# Patient Record
Sex: FEMALE | Race: WHITE | NOT HISPANIC OR LATINO | Employment: OTHER | URBAN - NONMETROPOLITAN AREA
[De-identification: names, ages, dates, MRNs, and addresses within clinical notes are randomized per-mention and may not be internally consistent; named-entity substitution may affect disease eponyms.]

---

## 2017-01-01 LAB
ALBUMIN SERPL-MCNC: 3.9 G/DL (ref 3.5–5.2)
ALBUMIN SERPL-MCNC: 3.9 G/DL (ref 3.5–5.2)
ALP BLD-CCNC: 84 U/L (ref 35–104)
ALT SERPL-CCNC: 6 U/L (ref 5–33)
ANION GAP SERPL CALCULATED.3IONS-SCNC: 14 MMOL/L (ref 7–19)
ANION GAP SERPL CALCULATED.3IONS-SCNC: 14 MMOL/L (ref 7–19)
AST SERPL-CCNC: 11 U/L (ref 5–32)
BASOPHILS ABSOLUTE: 0.1 K/UL (ref 0–0.2)
BASOPHILS RELATIVE PERCENT: 0.7 % (ref 0–1)
BILIRUB SERPL-MCNC: <0.2 MG/DL (ref 0.2–1.2)
BUN BLDV-MCNC: 35 MG/DL (ref 8–23)
BUN BLDV-MCNC: 35 MG/DL (ref 8–23)
CALCIUM SERPL-MCNC: 8.9 MG/DL (ref 8.8–10.2)
CALCIUM SERPL-MCNC: 8.9 MG/DL (ref 8.8–10.2)
CHLORIDE BLD-SCNC: 104 MMOL/L (ref 98–111)
CHLORIDE BLD-SCNC: 105 MMOL/L (ref 98–111)
CHOLESTEROL, TOTAL: 213 MG/DL (ref 160–199)
CO2: 23 MMOL/L (ref 22–29)
CO2: 23 MMOL/L (ref 22–29)
CREAT SERPL-MCNC: 1.6 MG/DL (ref 0.5–0.9)
CREAT SERPL-MCNC: 1.7 MG/DL (ref 0.5–0.9)
CREATININE URINE: 43.5 MG/DL (ref 4.2–622)
EOSINOPHILS ABSOLUTE: 0.2 K/UL (ref 0–0.6)
EOSINOPHILS RELATIVE PERCENT: 2.3 % (ref 0–5)
GFR NON-AFRICAN AMERICAN: 29
GFR NON-AFRICAN AMERICAN: 31
GLUCOSE BLD-MCNC: 148 MG/DL (ref 74–109)
GLUCOSE BLD-MCNC: 150 MG/DL (ref 74–109)
HBA1C MFR BLD: 8.1 %
HCT VFR BLD CALC: 33.7 % (ref 37–47)
HDLC SERPL-MCNC: 33 MG/DL (ref 65–121)
HEMOGLOBIN: 10.8 G/DL (ref 12–16)
LDL CHOLESTEROL CALCULATED: 127 MG/DL
LYMPHOCYTES ABSOLUTE: 2.1 K/UL (ref 1.1–4.5)
LYMPHOCYTES RELATIVE PERCENT: 25.4 % (ref 20–40)
MCH RBC QN AUTO: 30.6 PG (ref 27–31)
MCHC RBC AUTO-ENTMCNC: 32 G/DL (ref 33–37)
MCV RBC AUTO: 95.5 FL (ref 81–99)
MICROALBUMIN UR-MCNC: 213.3 MG/DL (ref 0–19)
MICROALBUMIN/CREAT UR-RTO: 4903.4 MG/G
MONOCYTES ABSOLUTE: 0.4 K/UL (ref 0–0.9)
MONOCYTES RELATIVE PERCENT: 5.3 % (ref 0–10)
NEUTROPHILS ABSOLUTE: 5.5 K/UL (ref 1.5–7.5)
NEUTROPHILS RELATIVE PERCENT: 65.6 % (ref 50–65)
PDW BLD-RTO: 13.5 % (ref 11.5–14.5)
PHOSPHORUS: 4.2 MG/DL (ref 2.5–4.5)
PLATELET # BLD: 320 K/UL (ref 130–400)
PMV BLD AUTO: 11.3 FL (ref 9.4–12.3)
POTASSIUM SERPL-SCNC: 4.6 MMOL/L (ref 3.5–5)
POTASSIUM SERPL-SCNC: 4.7 MMOL/L (ref 3.5–5)
RBC # BLD: 3.53 M/UL (ref 4.2–5.4)
SODIUM BLD-SCNC: 141 MMOL/L (ref 136–145)
SODIUM BLD-SCNC: 142 MMOL/L (ref 136–145)
TOTAL PROTEIN: 6.4 G/DL (ref 6.6–8.7)
TRIGL SERPL-MCNC: 267 MG/DL (ref 0–149)
TSH SERPL DL<=0.05 MIU/L-ACNC: 1.26 UIU/ML (ref 0.27–4.2)
URIC ACID, SERUM: 7.7 MG/DL (ref 2.4–5.7)
WBC # BLD: 8.4 K/UL (ref 4.8–10.8)

## 2017-01-17 ENCOUNTER — OFFICE VISIT (OUTPATIENT)
Dept: RETAIL CLINIC | Facility: CLINIC | Age: 77
End: 2017-01-17

## 2017-01-17 DIAGNOSIS — Z23 NEED FOR VACCINATION: Primary | ICD-10-CM

## 2017-01-17 NOTE — PROGRESS NOTES
Patient presented requesting flu shot which was administered per protocol by MA. See [employer] forms.

## 2017-02-14 ENCOUNTER — HOSPITAL ENCOUNTER (EMERGENCY)
Age: 77
Discharge: HOME | End: 2017-02-14
Attending: EMERGENCY MEDICINE
Payer: MEDICARE

## 2017-02-14 ENCOUNTER — APPOINTMENT (OUTPATIENT)
Dept: GENERAL RADIOLOGY | Age: 77
End: 2017-02-14
Payer: MEDICARE

## 2017-02-14 ENCOUNTER — APPOINTMENT (OUTPATIENT)
Dept: CT IMAGING | Age: 77
End: 2017-02-14
Payer: MEDICARE

## 2017-02-14 VITALS
SYSTOLIC BLOOD PRESSURE: 158 MMHG | OXYGEN SATURATION: 95 % | BODY MASS INDEX: 22.2 KG/M2 | TEMPERATURE: 98 F | DIASTOLIC BLOOD PRESSURE: 70 MMHG | RESPIRATION RATE: 20 BRPM | WEIGHT: 130 LBS | HEIGHT: 64 IN | HEART RATE: 85 BPM

## 2017-02-14 DIAGNOSIS — S70.02XA CONTUSION OF LEFT HIP, INITIAL ENCOUNTER: ICD-10-CM

## 2017-02-14 DIAGNOSIS — R29.6 FREQUENT FALLS: Primary | ICD-10-CM

## 2017-02-14 PROCEDURE — 73521 X-RAY EXAM HIPS BI 2 VIEWS: CPT

## 2017-02-14 PROCEDURE — 72192 CT PELVIS W/O DYE: CPT

## 2017-02-14 PROCEDURE — 99283 EMERGENCY DEPT VISIT LOW MDM: CPT

## 2017-02-14 PROCEDURE — 6360000002 HC RX W HCPCS: Performed by: EMERGENCY MEDICINE

## 2017-02-14 PROCEDURE — 99283 EMERGENCY DEPT VISIT LOW MDM: CPT | Performed by: EMERGENCY MEDICINE

## 2017-02-14 RX ORDER — MORPHINE SULFATE 4 MG/ML
4 INJECTION, SOLUTION INTRAMUSCULAR; INTRAVENOUS ONCE
Status: COMPLETED | OUTPATIENT
Start: 2017-02-14 | End: 2017-02-14

## 2017-02-14 RX ORDER — HYDROCODONE BITARTRATE AND ACETAMINOPHEN 7.5; 325 MG/1; MG/1
1 TABLET ORAL EVERY 6 HOURS PRN
Qty: 15 TABLET | Refills: 0 | Status: SHIPPED | OUTPATIENT
Start: 2017-02-14

## 2017-02-14 RX ORDER — MORPHINE SULFATE 4 MG/ML
2 INJECTION, SOLUTION INTRAMUSCULAR; INTRAVENOUS ONCE
Status: COMPLETED | OUTPATIENT
Start: 2017-02-14 | End: 2017-02-14

## 2017-02-14 RX ADMIN — MORPHINE SULFATE 2 MG: 4 INJECTION, SOLUTION INTRAMUSCULAR; INTRAVENOUS at 13:20

## 2017-02-14 RX ADMIN — MORPHINE SULFATE 4 MG: 4 INJECTION, SOLUTION INTRAMUSCULAR; INTRAVENOUS at 12:23

## 2017-02-14 ASSESSMENT — ENCOUNTER SYMPTOMS: BACK PAIN: 0

## 2017-02-14 ASSESSMENT — PAIN SCALES - GENERAL
PAINLEVEL_OUTOF10: 10

## 2017-02-14 ASSESSMENT — PAIN DESCRIPTION - ORIENTATION: ORIENTATION: RIGHT;LEFT

## 2017-02-14 ASSESSMENT — PAIN DESCRIPTION - LOCATION: LOCATION: HIP

## 2017-02-14 ASSESSMENT — PAIN DESCRIPTION - PAIN TYPE: TYPE: ACUTE PAIN

## 2017-07-06 ENCOUNTER — HOSPITAL ENCOUNTER (OUTPATIENT)
Dept: ULTRASOUND IMAGING | Age: 77
Discharge: HOME | End: 2017-07-06
Payer: MEDICARE

## 2017-07-06 DIAGNOSIS — I10 ESSENTIAL HYPERTENSION, BENIGN: ICD-10-CM

## 2017-07-06 DIAGNOSIS — E11.22 TYPE 2 DIABETES MELLITUS WITH ESRD (END-STAGE RENAL DISEASE) (HCC): ICD-10-CM

## 2017-07-06 DIAGNOSIS — N18.6 TYPE 2 DIABETES MELLITUS WITH ESRD (END-STAGE RENAL DISEASE) (HCC): ICD-10-CM

## 2017-07-06 PROCEDURE — 93976 VASCULAR STUDY: CPT

## 2017-08-09 RX ORDER — CILOSTAZOL 50 MG/1
TABLET ORAL
Qty: 60 TABLET | Refills: 7 | Status: SHIPPED | OUTPATIENT
Start: 2017-08-09 | End: 2018-01-01 | Stop reason: SDUPTHER

## 2018-01-01 ENCOUNTER — OFFICE VISIT (OUTPATIENT)
Dept: CARDIOLOGY | Age: 78
End: 2018-01-01
Payer: MEDICARE

## 2018-01-01 ENCOUNTER — APPOINTMENT (OUTPATIENT)
Dept: GENERAL RADIOLOGY | Age: 78
DRG: 291 | End: 2018-01-01
Attending: INTERNAL MEDICINE
Payer: MEDICARE

## 2018-01-01 ENCOUNTER — APPOINTMENT (OUTPATIENT)
Dept: GENERAL RADIOLOGY | Age: 78
DRG: 291 | End: 2018-01-01
Payer: MEDICARE

## 2018-01-01 ENCOUNTER — APPOINTMENT (OUTPATIENT)
Dept: ULTRASOUND IMAGING | Age: 78
DRG: 291 | End: 2018-01-01
Attending: INTERNAL MEDICINE
Payer: MEDICARE

## 2018-01-01 ENCOUNTER — TELEPHONE (OUTPATIENT)
Dept: CARDIOLOGY | Age: 78
End: 2018-01-01

## 2018-01-01 ENCOUNTER — APPOINTMENT (OUTPATIENT)
Dept: INTERVENTIONAL RADIOLOGY/VASCULAR | Age: 78
DRG: 291 | End: 2018-01-01
Attending: INTERNAL MEDICINE
Payer: MEDICARE

## 2018-01-01 ENCOUNTER — HOSPITAL ENCOUNTER (INPATIENT)
Age: 78
LOS: 20 days | Discharge: HOSPICE | DRG: 291 | End: 2018-11-27
Attending: INTERNAL MEDICINE | Admitting: INTERNAL MEDICINE
Payer: MEDICARE

## 2018-01-01 ENCOUNTER — HOSPITAL ENCOUNTER (INPATIENT)
Age: 78
LOS: 6 days | Discharge: HOME | DRG: 291 | End: 2018-09-19
Attending: EMERGENCY MEDICINE | Admitting: FAMILY MEDICINE
Payer: MEDICARE

## 2018-01-01 ENCOUNTER — APPOINTMENT (OUTPATIENT)
Dept: CT IMAGING | Age: 78
DRG: 291 | End: 2018-01-01
Payer: MEDICARE

## 2018-01-01 VITALS
WEIGHT: 122 LBS | SYSTOLIC BLOOD PRESSURE: 144 MMHG | DIASTOLIC BLOOD PRESSURE: 88 MMHG | HEART RATE: 72 BPM | HEIGHT: 64 IN | BODY MASS INDEX: 20.83 KG/M2

## 2018-01-01 VITALS
BODY MASS INDEX: 25.27 KG/M2 | HEART RATE: 70 BPM | DIASTOLIC BLOOD PRESSURE: 62 MMHG | WEIGHT: 148 LBS | OXYGEN SATURATION: 95 % | SYSTOLIC BLOOD PRESSURE: 98 MMHG | HEIGHT: 64 IN

## 2018-01-01 VITALS
BODY MASS INDEX: 20.66 KG/M2 | WEIGHT: 121 LBS | HEIGHT: 64 IN | HEART RATE: 88 BPM | DIASTOLIC BLOOD PRESSURE: 82 MMHG | SYSTOLIC BLOOD PRESSURE: 130 MMHG

## 2018-01-01 VITALS
SYSTOLIC BLOOD PRESSURE: 144 MMHG | HEART RATE: 74 BPM | WEIGHT: 148 LBS | DIASTOLIC BLOOD PRESSURE: 80 MMHG | BODY MASS INDEX: 25.27 KG/M2 | HEIGHT: 64 IN

## 2018-01-01 VITALS
SYSTOLIC BLOOD PRESSURE: 90 MMHG | BODY MASS INDEX: 23.08 KG/M2 | WEIGHT: 135.2 LBS | RESPIRATION RATE: 16 BRPM | TEMPERATURE: 98.2 F | DIASTOLIC BLOOD PRESSURE: 52 MMHG | HEIGHT: 64 IN | OXYGEN SATURATION: 95 % | HEART RATE: 85 BPM

## 2018-01-01 VITALS
RESPIRATION RATE: 20 BRPM | WEIGHT: 118 LBS | TEMPERATURE: 97.3 F | BODY MASS INDEX: 20.14 KG/M2 | OXYGEN SATURATION: 92 % | DIASTOLIC BLOOD PRESSURE: 61 MMHG | HEIGHT: 64 IN | SYSTOLIC BLOOD PRESSURE: 134 MMHG | HEART RATE: 65 BPM

## 2018-01-01 DIAGNOSIS — K55.1 SUPERIOR MESENTERIC ARTERY STENOSIS (HCC): ICD-10-CM

## 2018-01-01 DIAGNOSIS — N18.9 CHRONIC KIDNEY DISEASE, UNSPECIFIED CKD STAGE: ICD-10-CM

## 2018-01-01 DIAGNOSIS — E13.42 OTHER SPECIFIED DIABETES MELLITUS WITH DIABETIC POLYNEUROPATHY, WITH LONG-TERM CURRENT USE OF INSULIN (HCC): Chronic | ICD-10-CM

## 2018-01-01 DIAGNOSIS — I50.42 CHRONIC COMBINED SYSTOLIC (CONGESTIVE) AND DIASTOLIC (CONGESTIVE) HEART FAILURE (HCC): Primary | ICD-10-CM

## 2018-01-01 DIAGNOSIS — I70.213 ATHEROSCLEROSIS OF NATIVE ARTERY OF BOTH LOWER EXTREMITIES WITH INTERMITTENT CLAUDICATION (HCC): ICD-10-CM

## 2018-01-01 DIAGNOSIS — I70.211 ATHEROSCLEROSIS OF NATIVE ARTERY OF RIGHT LOWER EXTREMITY WITH INTERMITTENT CLAUDICATION (HCC): ICD-10-CM

## 2018-01-01 DIAGNOSIS — I65.23 INTERNAL CAROTID ARTERY STENOSIS, BILATERAL: ICD-10-CM

## 2018-01-01 DIAGNOSIS — I70.213 ATHEROSCLEROSIS OF NATIVE ARTERY OF BOTH LOWER EXTREMITIES WITH INTERMITTENT CLAUDICATION (HCC): Chronic | ICD-10-CM

## 2018-01-01 DIAGNOSIS — I50.43 ACUTE ON CHRONIC COMBINED SYSTOLIC AND DIASTOLIC CHF (CONGESTIVE HEART FAILURE) (HCC): Primary | ICD-10-CM

## 2018-01-01 DIAGNOSIS — E16.2 HYPOGLYCEMIA: Primary | ICD-10-CM

## 2018-01-01 DIAGNOSIS — I63.9 CEREBROVASCULAR ACCIDENT (CVA), UNSPECIFIED MECHANISM (HCC): ICD-10-CM

## 2018-01-01 DIAGNOSIS — I10 ESSENTIAL HYPERTENSION: ICD-10-CM

## 2018-01-01 DIAGNOSIS — Z79.4 OTHER SPECIFIED DIABETES MELLITUS WITH DIABETIC POLYNEUROPATHY, WITH LONG-TERM CURRENT USE OF INSULIN (HCC): Chronic | ICD-10-CM

## 2018-01-01 DIAGNOSIS — I25.118 CORONARY ARTERY DISEASE OF NATIVE HEART WITH STABLE ANGINA PECTORIS, UNSPECIFIED VESSEL OR LESION TYPE (HCC): Primary | ICD-10-CM

## 2018-01-01 DIAGNOSIS — R41.0 EPISODE OF CONFUSION: ICD-10-CM

## 2018-01-01 DIAGNOSIS — N30.00 ACUTE CYSTITIS WITHOUT HEMATURIA: ICD-10-CM

## 2018-01-01 DIAGNOSIS — I50.9 CONGESTIVE HEART FAILURE, UNSPECIFIED HF CHRONICITY, UNSPECIFIED HEART FAILURE TYPE (HCC): ICD-10-CM

## 2018-01-01 LAB
ALBUMIN SERPL-MCNC: 2.3 G/DL (ref 3.5–5.2)
ALBUMIN SERPL-MCNC: 2.4 G/DL (ref 3.5–5.2)
ALBUMIN SERPL-MCNC: 2.5 G/DL (ref 3.5–5.2)
ALBUMIN SERPL-MCNC: 2.8 G/DL (ref 3.5–5.2)
ALBUMIN SERPL-MCNC: 2.9 G/DL (ref 3.5–5.2)
ALBUMIN SERPL-MCNC: 3.2 G/DL (ref 3.5–5.2)
ALBUMIN SERPL-MCNC: 3.2 G/DL (ref 3.5–5.2)
ALBUMIN SERPL-MCNC: 3.3 G/DL (ref 3.5–5.2)
ALBUMIN SERPL-MCNC: 3.3 G/DL (ref 3.5–5.2)
ALBUMIN SERPL-MCNC: 3.7 G/DL (ref 3.5–5.2)
ALBUMIN SERPL-MCNC: 3.7 G/DL (ref 3.5–5.2)
ALBUMIN SERPL-MCNC: 3.9 G/DL (ref 3.5–5.2)
ALP BLD-CCNC: 100 U/L (ref 35–104)
ALP BLD-CCNC: 106 U/L (ref 35–104)
ALP BLD-CCNC: 120 U/L (ref 35–104)
ALP BLD-CCNC: 134 U/L (ref 35–104)
ALP BLD-CCNC: 166 U/L (ref 35–104)
ALP BLD-CCNC: 217 U/L (ref 35–104)
ALP BLD-CCNC: 41 U/L (ref 35–104)
ALP BLD-CCNC: 42 U/L (ref 35–104)
ALP BLD-CCNC: 43 U/L (ref 35–104)
ALP BLD-CCNC: 55 U/L (ref 35–104)
ALP BLD-CCNC: 64 U/L (ref 35–104)
ALT SERPL-CCNC: 11 U/L (ref 5–33)
ALT SERPL-CCNC: 113 U/L (ref 5–33)
ALT SERPL-CCNC: 12 U/L (ref 5–33)
ALT SERPL-CCNC: 13 U/L (ref 5–33)
ALT SERPL-CCNC: 13 U/L (ref 5–33)
ALT SERPL-CCNC: 35 U/L (ref 5–33)
ALT SERPL-CCNC: 7 U/L (ref 5–33)
ALT SERPL-CCNC: 75 U/L (ref 5–33)
ALT SERPL-CCNC: 8 U/L (ref 5–33)
ALT SERPL-CCNC: 8 U/L (ref 5–33)
ALT SERPL-CCNC: 9 U/L (ref 5–33)
ANION GAP SERPL CALCULATED.3IONS-SCNC: 11 MMOL/L (ref 7–19)
ANION GAP SERPL CALCULATED.3IONS-SCNC: 12 MMOL/L (ref 7–19)
ANION GAP SERPL CALCULATED.3IONS-SCNC: 13 MMOL/L (ref 7–19)
ANION GAP SERPL CALCULATED.3IONS-SCNC: 14 MMOL/L (ref 7–19)
ANION GAP SERPL CALCULATED.3IONS-SCNC: 15 MMOL/L (ref 7–19)
ANION GAP SERPL CALCULATED.3IONS-SCNC: 15 MMOL/L (ref 7–19)
ANION GAP SERPL CALCULATED.3IONS-SCNC: 16 MMOL/L (ref 7–19)
ANION GAP SERPL CALCULATED.3IONS-SCNC: 17 MMOL/L (ref 7–19)
ANION GAP SERPL CALCULATED.3IONS-SCNC: 18 MMOL/L (ref 7–19)
ANION GAP SERPL CALCULATED.3IONS-SCNC: 19 MMOL/L (ref 7–19)
ANION GAP SERPL CALCULATED.3IONS-SCNC: 7 MMOL/L (ref 7–19)
ANION GAP SERPL CALCULATED.3IONS-SCNC: 9 MMOL/L (ref 7–19)
APTT: 24.8 SEC (ref 26–36.2)
APTT: 36.4 SEC (ref 26–36.2)
APTT: 38.6 SEC (ref 26–36.2)
AST SERPL-CCNC: 11 U/L (ref 5–32)
AST SERPL-CCNC: 11 U/L (ref 5–32)
AST SERPL-CCNC: 12 U/L (ref 5–32)
AST SERPL-CCNC: 12 U/L (ref 5–32)
AST SERPL-CCNC: 13 U/L (ref 5–32)
AST SERPL-CCNC: 13 U/L (ref 5–32)
AST SERPL-CCNC: 14 U/L (ref 5–32)
AST SERPL-CCNC: 15 U/L (ref 5–32)
AST SERPL-CCNC: 164 U/L (ref 5–32)
AST SERPL-CCNC: 18 U/L (ref 5–32)
AST SERPL-CCNC: 42 U/L (ref 5–32)
BACTERIA: ABNORMAL /HPF
BASE EXCESS ARTERIAL: -0.1 MMOL/L (ref -2–2)
BASE EXCESS ARTERIAL: -0.5 MMOL/L (ref -2–2)
BASE EXCESS ARTERIAL: -3.7 MMOL/L (ref -2–2)
BASE EXCESS ARTERIAL: 0.2 MMOL/L (ref -2–2)
BASE EXCESS ARTERIAL: 1.2 MMOL/L (ref -2–2)
BASE EXCESS ARTERIAL: 7.6 MMOL/L (ref -2–2)
BASOPHILS ABSOLUTE: 0 K/UL (ref 0–0.2)
BASOPHILS ABSOLUTE: 0.1 K/UL (ref 0–0.2)
BASOPHILS RELATIVE PERCENT: 0.3 % (ref 0–1)
BASOPHILS RELATIVE PERCENT: 0.3 % (ref 0–1)
BASOPHILS RELATIVE PERCENT: 0.5 % (ref 0–1)
BASOPHILS RELATIVE PERCENT: 0.7 % (ref 0–1)
BASOPHILS RELATIVE PERCENT: 0.8 % (ref 0–1)
BASOPHILS RELATIVE PERCENT: 0.9 % (ref 0–1)
BASOPHILS RELATIVE PERCENT: 0.9 % (ref 0–1)
BASOPHILS RELATIVE PERCENT: 1 % (ref 0–1)
BILIRUB SERPL-MCNC: 0.3 MG/DL (ref 0.2–1.2)
BILIRUB SERPL-MCNC: 0.4 MG/DL (ref 0.2–1.2)
BILIRUB SERPL-MCNC: 0.4 MG/DL (ref 0.2–1.2)
BILIRUB SERPL-MCNC: 0.5 MG/DL (ref 0.2–1.2)
BILIRUB SERPL-MCNC: <0.2 MG/DL (ref 0.2–1.2)
BILIRUBIN URINE: NEGATIVE
BLOOD, URINE: ABNORMAL
BLOOD, URINE: ABNORMAL
BLOOD, URINE: NEGATIVE
BUN BLDV-MCNC: 13 MG/DL (ref 8–23)
BUN BLDV-MCNC: 13 MG/DL (ref 8–23)
BUN BLDV-MCNC: 18 MG/DL (ref 8–23)
BUN BLDV-MCNC: 20 MG/DL (ref 8–23)
BUN BLDV-MCNC: 21 MG/DL (ref 8–23)
BUN BLDV-MCNC: 24 MG/DL (ref 8–23)
BUN BLDV-MCNC: 28 MG/DL (ref 8–23)
BUN BLDV-MCNC: 31 MG/DL (ref 8–23)
BUN BLDV-MCNC: 33 MG/DL (ref 8–23)
BUN BLDV-MCNC: 34 MG/DL (ref 8–23)
BUN BLDV-MCNC: 39 MG/DL (ref 8–23)
BUN BLDV-MCNC: 40 MG/DL (ref 8–23)
BUN BLDV-MCNC: 42 MG/DL (ref 8–23)
BUN BLDV-MCNC: 44 MG/DL (ref 8–23)
BUN BLDV-MCNC: 46 MG/DL (ref 8–23)
BUN BLDV-MCNC: 47 MG/DL (ref 8–23)
BUN BLDV-MCNC: 50 MG/DL (ref 8–23)
BUN BLDV-MCNC: 51 MG/DL (ref 8–23)
BUN BLDV-MCNC: 51 MG/DL (ref 8–23)
BUN BLDV-MCNC: 52 MG/DL (ref 8–23)
BUN BLDV-MCNC: 55 MG/DL (ref 8–23)
BUN BLDV-MCNC: 58 MG/DL (ref 8–23)
BUN BLDV-MCNC: 60 MG/DL (ref 8–23)
BUN BLDV-MCNC: 62 MG/DL (ref 8–23)
BUN BLDV-MCNC: 63 MG/DL (ref 8–23)
BUN BLDV-MCNC: 72 MG/DL (ref 8–23)
BUN BLDV-MCNC: 78 MG/DL (ref 8–23)
BUN BLDV-MCNC: 84 MG/DL (ref 8–23)
BUN BLDV-MCNC: 84 MG/DL (ref 8–23)
BUN BLDV-MCNC: 85 MG/DL (ref 8–23)
BUN BLDV-MCNC: 9 MG/DL (ref 8–23)
BUN BLDV-MCNC: 90 MG/DL (ref 8–23)
CALCIUM SERPL-MCNC: 11 MG/DL (ref 8.8–10.2)
CALCIUM SERPL-MCNC: 7.7 MG/DL (ref 8.8–10.2)
CALCIUM SERPL-MCNC: 7.8 MG/DL (ref 8.8–10.2)
CALCIUM SERPL-MCNC: 8 MG/DL (ref 8.8–10.2)
CALCIUM SERPL-MCNC: 8 MG/DL (ref 8.8–10.2)
CALCIUM SERPL-MCNC: 8.1 MG/DL (ref 8.8–10.2)
CALCIUM SERPL-MCNC: 8.2 MG/DL (ref 8.8–10.2)
CALCIUM SERPL-MCNC: 8.3 MG/DL (ref 8.8–10.2)
CALCIUM SERPL-MCNC: 8.4 MG/DL (ref 8.8–10.2)
CALCIUM SERPL-MCNC: 8.5 MG/DL (ref 8.8–10.2)
CALCIUM SERPL-MCNC: 8.6 MG/DL (ref 8.8–10.2)
CALCIUM SERPL-MCNC: 8.7 MG/DL (ref 8.8–10.2)
CALCIUM SERPL-MCNC: 8.7 MG/DL (ref 8.8–10.2)
CALCIUM SERPL-MCNC: 8.8 MG/DL (ref 8.8–10.2)
CALCIUM SERPL-MCNC: 8.9 MG/DL (ref 8.8–10.2)
CALCIUM SERPL-MCNC: 9.1 MG/DL (ref 8.8–10.2)
CALCIUM SERPL-MCNC: 9.2 MG/DL (ref 8.8–10.2)
CARBOXYHEMOGLOBIN ARTERIAL: 2 % (ref 0–5)
CARBOXYHEMOGLOBIN ARTERIAL: 2.3 % (ref 0–5)
CARBOXYHEMOGLOBIN ARTERIAL: 2.3 % (ref 0–5)
CARBOXYHEMOGLOBIN ARTERIAL: 2.4 % (ref 0–5)
CARBOXYHEMOGLOBIN ARTERIAL: 2.5 % (ref 0–5)
CARBOXYHEMOGLOBIN ARTERIAL: 2.8 % (ref 0–5)
CHLORIDE BLD-SCNC: 100 MMOL/L (ref 98–111)
CHLORIDE BLD-SCNC: 102 MMOL/L (ref 98–111)
CHLORIDE BLD-SCNC: 103 MMOL/L (ref 98–111)
CHLORIDE BLD-SCNC: 105 MMOL/L (ref 98–111)
CHLORIDE BLD-SCNC: 106 MMOL/L (ref 98–111)
CHLORIDE BLD-SCNC: 107 MMOL/L (ref 98–111)
CHLORIDE BLD-SCNC: 91 MMOL/L (ref 98–111)
CHLORIDE BLD-SCNC: 92 MMOL/L (ref 98–111)
CHLORIDE BLD-SCNC: 93 MMOL/L (ref 98–111)
CHLORIDE BLD-SCNC: 93 MMOL/L (ref 98–111)
CHLORIDE BLD-SCNC: 94 MMOL/L (ref 98–111)
CHLORIDE BLD-SCNC: 94 MMOL/L (ref 98–111)
CHLORIDE BLD-SCNC: 95 MMOL/L (ref 98–111)
CHLORIDE BLD-SCNC: 96 MMOL/L (ref 98–111)
CHLORIDE BLD-SCNC: 97 MMOL/L (ref 98–111)
CHLORIDE BLD-SCNC: 98 MMOL/L (ref 98–111)
CHLORIDE BLD-SCNC: 98 MMOL/L (ref 98–111)
CHLORIDE BLD-SCNC: 99 MMOL/L (ref 98–111)
CHOLESTEROL, TOTAL: 158 MG/DL (ref 160–199)
CLARITY: ABNORMAL
CO2: 20 MMOL/L (ref 22–29)
CO2: 20 MMOL/L (ref 22–29)
CO2: 21 MMOL/L (ref 22–29)
CO2: 22 MMOL/L (ref 22–29)
CO2: 23 MMOL/L (ref 22–29)
CO2: 24 MMOL/L (ref 22–29)
CO2: 25 MMOL/L (ref 22–29)
CO2: 26 MMOL/L (ref 22–29)
CO2: 27 MMOL/L (ref 22–29)
CO2: 28 MMOL/L (ref 22–29)
CO2: 28 MMOL/L (ref 22–29)
COLOR: YELLOW
CREAT SERPL-MCNC: 0.9 MG/DL (ref 0.5–0.9)
CREAT SERPL-MCNC: 1.1 MG/DL (ref 0.5–0.9)
CREAT SERPL-MCNC: 1.6 MG/DL (ref 0.5–0.9)
CREAT SERPL-MCNC: 1.8 MG/DL (ref 0.5–0.9)
CREAT SERPL-MCNC: 1.8 MG/DL (ref 0.5–0.9)
CREAT SERPL-MCNC: 1.9 MG/DL (ref 0.5–0.9)
CREAT SERPL-MCNC: 1.9 MG/DL (ref 0.5–0.9)
CREAT SERPL-MCNC: 2 MG/DL (ref 0.5–0.9)
CREAT SERPL-MCNC: 2.2 MG/DL (ref 0.5–0.9)
CREAT SERPL-MCNC: 2.2 MG/DL (ref 0.5–0.9)
CREAT SERPL-MCNC: 2.3 MG/DL (ref 0.5–0.9)
CREAT SERPL-MCNC: 2.4 MG/DL (ref 0.5–0.9)
CREAT SERPL-MCNC: 2.5 MG/DL (ref 0.5–0.9)
CREAT SERPL-MCNC: 2.6 MG/DL (ref 0.5–0.9)
CREAT SERPL-MCNC: 2.7 MG/DL (ref 0.5–0.9)
CREAT SERPL-MCNC: 2.8 MG/DL (ref 0.5–0.9)
CREAT SERPL-MCNC: 3.1 MG/DL (ref 0.5–0.9)
CREAT SERPL-MCNC: 3.1 MG/DL (ref 0.5–0.9)
CREAT SERPL-MCNC: 3.6 MG/DL (ref 0.5–0.9)
CREAT SERPL-MCNC: 3.8 MG/DL (ref 0.5–0.9)
CREAT SERPL-MCNC: 4.1 MG/DL (ref 0.5–0.9)
CREAT SERPL-MCNC: 4.3 MG/DL (ref 0.5–0.9)
CREAT SERPL-MCNC: 4.4 MG/DL (ref 0.5–0.9)
CREAT SERPL-MCNC: 4.6 MG/DL (ref 0.5–0.9)
CREATININE URINE: 37.9 MG/DL (ref 4.2–622)
CREATININE URINE: 60.9 MG/DL (ref 4.2–622)
DIGOXIN LEVEL: <0.3 NG/ML (ref 0.6–1.2)
EKG P AXIS: -59 DEGREES
EKG P AXIS: -9 DEGREES
EKG P AXIS: 142 DEGREES
EKG P AXIS: 37 DEGREES
EKG P AXIS: 39 DEGREES
EKG P AXIS: 52 DEGREES
EKG P AXIS: 53 DEGREES
EKG P AXIS: 56 DEGREES
EKG P AXIS: 57 DEGREES
EKG P AXIS: 58 DEGREES
EKG P AXIS: 66 DEGREES
EKG P AXIS: 68 DEGREES
EKG P AXIS: 68 DEGREES
EKG P AXIS: 70 DEGREES
EKG P AXIS: NORMAL DEGREES
EKG P-R INTERVAL: 170 MS
EKG P-R INTERVAL: 174 MS
EKG P-R INTERVAL: 188 MS
EKG P-R INTERVAL: 194 MS
EKG P-R INTERVAL: 194 MS
EKG P-R INTERVAL: 196 MS
EKG P-R INTERVAL: 200 MS
EKG P-R INTERVAL: 202 MS
EKG P-R INTERVAL: 212 MS
EKG P-R INTERVAL: 214 MS
EKG P-R INTERVAL: 214 MS
EKG P-R INTERVAL: 220 MS
EKG P-R INTERVAL: NORMAL MS
EKG Q-T INTERVAL: 322 MS
EKG Q-T INTERVAL: 358 MS
EKG Q-T INTERVAL: 378 MS
EKG Q-T INTERVAL: 380 MS
EKG Q-T INTERVAL: 412 MS
EKG Q-T INTERVAL: 422 MS
EKG Q-T INTERVAL: 424 MS
EKG Q-T INTERVAL: 452 MS
EKG Q-T INTERVAL: 456 MS
EKG Q-T INTERVAL: 460 MS
EKG Q-T INTERVAL: 468 MS
EKG Q-T INTERVAL: 474 MS
EKG Q-T INTERVAL: 474 MS
EKG Q-T INTERVAL: 476 MS
EKG Q-T INTERVAL: 502 MS
EKG Q-T INTERVAL: 506 MS
EKG Q-T INTERVAL: 518 MS
EKG QRS DURATION: 112 MS
EKG QRS DURATION: 118 MS
EKG QRS DURATION: 120 MS
EKG QRS DURATION: 122 MS
EKG QRS DURATION: 124 MS
EKG QRS DURATION: 126 MS
EKG QRS DURATION: 126 MS
EKG QRS DURATION: 128 MS
EKG QRS DURATION: 130 MS
EKG QRS DURATION: 136 MS
EKG QRS DURATION: 144 MS
EKG QRS DURATION: 144 MS
EKG QRS DURATION: 174 MS
EKG QTC CALCULATION (BAZETT): 421 MS
EKG QTC CALCULATION (BAZETT): 427 MS
EKG QTC CALCULATION (BAZETT): 434 MS
EKG QTC CALCULATION (BAZETT): 446 MS
EKG QTC CALCULATION (BAZETT): 457 MS
EKG QTC CALCULATION (BAZETT): 457 MS
EKG QTC CALCULATION (BAZETT): 459 MS
EKG QTC CALCULATION (BAZETT): 473 MS
EKG QTC CALCULATION (BAZETT): 473 MS
EKG QTC CALCULATION (BAZETT): 477 MS
EKG QTC CALCULATION (BAZETT): 482 MS
EKG QTC CALCULATION (BAZETT): 484 MS
EKG QTC CALCULATION (BAZETT): 484 MS
EKG QTC CALCULATION (BAZETT): 488 MS
EKG QTC CALCULATION (BAZETT): 501 MS
EKG QTC CALCULATION (BAZETT): 501 MS
EKG QTC CALCULATION (BAZETT): 508 MS
EKG T AXIS: -61 DEGREES
EKG T AXIS: 114 DEGREES
EKG T AXIS: 115 DEGREES
EKG T AXIS: 121 DEGREES
EKG T AXIS: 123 DEGREES
EKG T AXIS: 124 DEGREES
EKG T AXIS: 128 DEGREES
EKG T AXIS: 130 DEGREES
EKG T AXIS: 142 DEGREES
EKG T AXIS: 142 DEGREES
EKG T AXIS: 145 DEGREES
EKG T AXIS: 147 DEGREES
EKG T AXIS: 156 DEGREES
EKG T AXIS: 44 DEGREES
EKG T AXIS: 56 DEGREES
EKG T AXIS: 68 DEGREES
EKG T AXIS: 99 DEGREES
EOSINOPHILS ABSOLUTE: 0 K/UL (ref 0–0.6)
EOSINOPHILS ABSOLUTE: 0.1 K/UL (ref 0–0.6)
EOSINOPHILS ABSOLUTE: 0.1 K/UL (ref 0–0.6)
EOSINOPHILS ABSOLUTE: 0.2 K/UL (ref 0–0.6)
EOSINOPHILS ABSOLUTE: 0.3 K/UL (ref 0–0.6)
EOSINOPHILS RELATIVE PERCENT: 0.2 % (ref 0–5)
EOSINOPHILS RELATIVE PERCENT: 1.2 % (ref 0–5)
EOSINOPHILS RELATIVE PERCENT: 1.4 % (ref 0–5)
EOSINOPHILS RELATIVE PERCENT: 2.9 % (ref 0–5)
EOSINOPHILS RELATIVE PERCENT: 3 % (ref 0–5)
EOSINOPHILS RELATIVE PERCENT: 3.4 % (ref 0–5)
EOSINOPHILS RELATIVE PERCENT: 3.9 % (ref 0–5)
EOSINOPHILS RELATIVE PERCENT: 3.9 % (ref 0–5)
EOSINOPHILS RELATIVE PERCENT: 4 % (ref 0–5)
EOSINOPHILS RELATIVE PERCENT: 4.7 % (ref 0–5)
EPITHELIAL CELLS, UA: 1 /HPF (ref 0–5)
EPITHELIAL CELLS, UA: 2 /HPF (ref 0–5)
EPITHELIAL CELLS, UA: ABNORMAL /HPF
FERRITIN: 56.9 NG/ML (ref 13–150)
FOLATE: 7.2 NG/ML (ref 4.8–37.3)
GFR NON-AFRICAN AMERICAN: 10
GFR NON-AFRICAN AMERICAN: 10
GFR NON-AFRICAN AMERICAN: 11
GFR NON-AFRICAN AMERICAN: 11
GFR NON-AFRICAN AMERICAN: 12
GFR NON-AFRICAN AMERICAN: 15
GFR NON-AFRICAN AMERICAN: 15
GFR NON-AFRICAN AMERICAN: 16
GFR NON-AFRICAN AMERICAN: 17
GFR NON-AFRICAN AMERICAN: 18
GFR NON-AFRICAN AMERICAN: 19
GFR NON-AFRICAN AMERICAN: 20
GFR NON-AFRICAN AMERICAN: 21
GFR NON-AFRICAN AMERICAN: 22
GFR NON-AFRICAN AMERICAN: 22
GFR NON-AFRICAN AMERICAN: 24
GFR NON-AFRICAN AMERICAN: 26
GFR NON-AFRICAN AMERICAN: 26
GFR NON-AFRICAN AMERICAN: 27
GFR NON-AFRICAN AMERICAN: 27
GFR NON-AFRICAN AMERICAN: 31
GFR NON-AFRICAN AMERICAN: 48
GFR NON-AFRICAN AMERICAN: 9
GFR NON-AFRICAN AMERICAN: >60
GLUCOSE BLD-MCNC: 100 MG/DL (ref 70–99)
GLUCOSE BLD-MCNC: 101 MG/DL (ref 70–99)
GLUCOSE BLD-MCNC: 108 MG/DL (ref 70–99)
GLUCOSE BLD-MCNC: 109 MG/DL (ref 74–109)
GLUCOSE BLD-MCNC: 110 MG/DL (ref 70–99)
GLUCOSE BLD-MCNC: 110 MG/DL (ref 70–99)
GLUCOSE BLD-MCNC: 112 MG/DL (ref 70–99)
GLUCOSE BLD-MCNC: 112 MG/DL (ref 74–109)
GLUCOSE BLD-MCNC: 112 MG/DL (ref 74–109)
GLUCOSE BLD-MCNC: 113 MG/DL (ref 74–109)
GLUCOSE BLD-MCNC: 114 MG/DL (ref 70–99)
GLUCOSE BLD-MCNC: 116 MG/DL (ref 70–99)
GLUCOSE BLD-MCNC: 117 MG/DL (ref 70–99)
GLUCOSE BLD-MCNC: 117 MG/DL (ref 74–109)
GLUCOSE BLD-MCNC: 117 MG/DL (ref 74–109)
GLUCOSE BLD-MCNC: 118 MG/DL (ref 70–99)
GLUCOSE BLD-MCNC: 118 MG/DL (ref 70–99)
GLUCOSE BLD-MCNC: 119 MG/DL (ref 74–109)
GLUCOSE BLD-MCNC: 120 MG/DL (ref 70–99)
GLUCOSE BLD-MCNC: 120 MG/DL (ref 74–109)
GLUCOSE BLD-MCNC: 122 MG/DL (ref 74–109)
GLUCOSE BLD-MCNC: 128 MG/DL (ref 70–99)
GLUCOSE BLD-MCNC: 129 MG/DL (ref 70–99)
GLUCOSE BLD-MCNC: 130 MG/DL (ref 70–99)
GLUCOSE BLD-MCNC: 131 MG/DL (ref 70–99)
GLUCOSE BLD-MCNC: 134 MG/DL (ref 70–99)
GLUCOSE BLD-MCNC: 134 MG/DL (ref 74–109)
GLUCOSE BLD-MCNC: 136 MG/DL (ref 70–99)
GLUCOSE BLD-MCNC: 137 MG/DL (ref 70–99)
GLUCOSE BLD-MCNC: 137 MG/DL (ref 74–109)
GLUCOSE BLD-MCNC: 138 MG/DL (ref 70–99)
GLUCOSE BLD-MCNC: 139 MG/DL (ref 70–99)
GLUCOSE BLD-MCNC: 139 MG/DL (ref 74–109)
GLUCOSE BLD-MCNC: 140 MG/DL (ref 70–99)
GLUCOSE BLD-MCNC: 143 MG/DL (ref 74–109)
GLUCOSE BLD-MCNC: 144 MG/DL (ref 74–109)
GLUCOSE BLD-MCNC: 145 MG/DL (ref 70–99)
GLUCOSE BLD-MCNC: 147 MG/DL (ref 70–99)
GLUCOSE BLD-MCNC: 149 MG/DL (ref 70–99)
GLUCOSE BLD-MCNC: 150 MG/DL (ref 70–99)
GLUCOSE BLD-MCNC: 151 MG/DL (ref 70–99)
GLUCOSE BLD-MCNC: 153 MG/DL (ref 70–99)
GLUCOSE BLD-MCNC: 157 MG/DL (ref 70–99)
GLUCOSE BLD-MCNC: 158 MG/DL (ref 70–99)
GLUCOSE BLD-MCNC: 160 MG/DL (ref 74–109)
GLUCOSE BLD-MCNC: 161 MG/DL (ref 70–99)
GLUCOSE BLD-MCNC: 166 MG/DL (ref 70–99)
GLUCOSE BLD-MCNC: 170 MG/DL (ref 70–99)
GLUCOSE BLD-MCNC: 170 MG/DL (ref 74–109)
GLUCOSE BLD-MCNC: 171 MG/DL (ref 70–99)
GLUCOSE BLD-MCNC: 173 MG/DL (ref 74–109)
GLUCOSE BLD-MCNC: 174 MG/DL (ref 70–99)
GLUCOSE BLD-MCNC: 174 MG/DL (ref 70–99)
GLUCOSE BLD-MCNC: 177 MG/DL (ref 74–109)
GLUCOSE BLD-MCNC: 178 MG/DL (ref 70–99)
GLUCOSE BLD-MCNC: 179 MG/DL (ref 70–99)
GLUCOSE BLD-MCNC: 183 MG/DL (ref 70–99)
GLUCOSE BLD-MCNC: 184 MG/DL (ref 70–99)
GLUCOSE BLD-MCNC: 185 MG/DL (ref 70–99)
GLUCOSE BLD-MCNC: 187 MG/DL (ref 74–109)
GLUCOSE BLD-MCNC: 188 MG/DL (ref 70–99)
GLUCOSE BLD-MCNC: 188 MG/DL (ref 74–109)
GLUCOSE BLD-MCNC: 196 MG/DL (ref 70–99)
GLUCOSE BLD-MCNC: 203 MG/DL (ref 70–99)
GLUCOSE BLD-MCNC: 204 MG/DL (ref 74–109)
GLUCOSE BLD-MCNC: 207 MG/DL (ref 74–109)
GLUCOSE BLD-MCNC: 209 MG/DL (ref 70–99)
GLUCOSE BLD-MCNC: 211 MG/DL (ref 70–99)
GLUCOSE BLD-MCNC: 213 MG/DL (ref 70–99)
GLUCOSE BLD-MCNC: 213 MG/DL (ref 70–99)
GLUCOSE BLD-MCNC: 215 MG/DL (ref 70–99)
GLUCOSE BLD-MCNC: 217 MG/DL (ref 70–99)
GLUCOSE BLD-MCNC: 218 MG/DL (ref 70–99)
GLUCOSE BLD-MCNC: 220 MG/DL (ref 74–109)
GLUCOSE BLD-MCNC: 221 MG/DL (ref 70–99)
GLUCOSE BLD-MCNC: 222 MG/DL (ref 70–99)
GLUCOSE BLD-MCNC: 226 MG/DL (ref 70–99)
GLUCOSE BLD-MCNC: 226 MG/DL (ref 70–99)
GLUCOSE BLD-MCNC: 227 MG/DL (ref 70–99)
GLUCOSE BLD-MCNC: 228 MG/DL (ref 70–99)
GLUCOSE BLD-MCNC: 228 MG/DL (ref 70–99)
GLUCOSE BLD-MCNC: 230 MG/DL (ref 70–99)
GLUCOSE BLD-MCNC: 232 MG/DL (ref 70–99)
GLUCOSE BLD-MCNC: 234 MG/DL (ref 70–99)
GLUCOSE BLD-MCNC: 235 MG/DL (ref 70–99)
GLUCOSE BLD-MCNC: 235 MG/DL (ref 70–99)
GLUCOSE BLD-MCNC: 236 MG/DL (ref 70–99)
GLUCOSE BLD-MCNC: 236 MG/DL (ref 70–99)
GLUCOSE BLD-MCNC: 237 MG/DL (ref 74–109)
GLUCOSE BLD-MCNC: 238 MG/DL (ref 70–99)
GLUCOSE BLD-MCNC: 244 MG/DL (ref 70–99)
GLUCOSE BLD-MCNC: 244 MG/DL (ref 70–99)
GLUCOSE BLD-MCNC: 247 MG/DL (ref 70–99)
GLUCOSE BLD-MCNC: 247 MG/DL (ref 70–99)
GLUCOSE BLD-MCNC: 247 MG/DL (ref 74–109)
GLUCOSE BLD-MCNC: 247 MG/DL (ref 74–109)
GLUCOSE BLD-MCNC: 250 MG/DL (ref 70–99)
GLUCOSE BLD-MCNC: 251 MG/DL (ref 74–109)
GLUCOSE BLD-MCNC: 253 MG/DL (ref 70–99)
GLUCOSE BLD-MCNC: 256 MG/DL (ref 74–109)
GLUCOSE BLD-MCNC: 258 MG/DL (ref 74–109)
GLUCOSE BLD-MCNC: 259 MG/DL (ref 70–99)
GLUCOSE BLD-MCNC: 259 MG/DL (ref 70–99)
GLUCOSE BLD-MCNC: 261 MG/DL (ref 70–99)
GLUCOSE BLD-MCNC: 266 MG/DL (ref 70–99)
GLUCOSE BLD-MCNC: 268 MG/DL (ref 70–99)
GLUCOSE BLD-MCNC: 268 MG/DL (ref 70–99)
GLUCOSE BLD-MCNC: 287 MG/DL (ref 70–99)
GLUCOSE BLD-MCNC: 292 MG/DL (ref 70–99)
GLUCOSE BLD-MCNC: 294 MG/DL (ref 70–99)
GLUCOSE BLD-MCNC: 301 MG/DL (ref 70–99)
GLUCOSE BLD-MCNC: 309 MG/DL (ref 70–99)
GLUCOSE BLD-MCNC: 322 MG/DL (ref 70–99)
GLUCOSE BLD-MCNC: 328 MG/DL (ref 70–99)
GLUCOSE BLD-MCNC: 357 MG/DL (ref 70–99)
GLUCOSE BLD-MCNC: 45 MG/DL (ref 74–109)
GLUCOSE BLD-MCNC: 46 MG/DL (ref 70–99)
GLUCOSE BLD-MCNC: 47 MG/DL (ref 70–99)
GLUCOSE BLD-MCNC: 51 MG/DL (ref 74–109)
GLUCOSE BLD-MCNC: 57 MG/DL (ref 70–99)
GLUCOSE BLD-MCNC: 67 MG/DL (ref 70–99)
GLUCOSE BLD-MCNC: 80 MG/DL (ref 70–99)
GLUCOSE BLD-MCNC: 82 MG/DL (ref 70–99)
GLUCOSE BLD-MCNC: 84 MG/DL (ref 70–99)
GLUCOSE BLD-MCNC: 85 MG/DL (ref 70–99)
GLUCOSE BLD-MCNC: 85 MG/DL (ref 70–99)
GLUCOSE BLD-MCNC: 86 MG/DL (ref 70–99)
GLUCOSE BLD-MCNC: 86 MG/DL (ref 70–99)
GLUCOSE BLD-MCNC: 89 MG/DL (ref 70–99)
GLUCOSE BLD-MCNC: 90 MG/DL (ref 70–99)
GLUCOSE BLD-MCNC: 93 MG/DL (ref 70–99)
GLUCOSE BLD-MCNC: 94 MG/DL (ref 74–109)
GLUCOSE BLD-MCNC: 97 MG/DL (ref 70–99)
GLUCOSE BLD-MCNC: 99 MG/DL (ref 70–99)
GLUCOSE URINE: 100 MG/DL
GLUCOSE URINE: NEGATIVE MG/DL
HAV IGM SER IA-ACNC: NORMAL
HBA1C MFR BLD: 7.3 % (ref 4–6)
HBV SURFACE AB TITR SER: NORMAL {TITER}
HCO3 ARTERIAL: 22.6 MMOL/L (ref 22–26)
HCO3 ARTERIAL: 23.5 MMOL/L (ref 22–26)
HCO3 ARTERIAL: 24.2 MMOL/L (ref 22–26)
HCO3 ARTERIAL: 24.4 MMOL/L (ref 22–26)
HCO3 ARTERIAL: 24.6 MMOL/L (ref 22–26)
HCO3 ARTERIAL: 31 MMOL/L (ref 22–26)
HCT VFR BLD CALC: 26 % (ref 37–47)
HCT VFR BLD CALC: 26.1 % (ref 37–47)
HCT VFR BLD CALC: 26.8 % (ref 37–47)
HCT VFR BLD CALC: 26.9 % (ref 37–47)
HCT VFR BLD CALC: 27 % (ref 37–47)
HCT VFR BLD CALC: 27.2 % (ref 37–47)
HCT VFR BLD CALC: 27.6 % (ref 37–47)
HCT VFR BLD CALC: 28 % (ref 37–47)
HCT VFR BLD CALC: 29.3 % (ref 37–47)
HCT VFR BLD CALC: 29.4 % (ref 37–47)
HCT VFR BLD CALC: 29.5 % (ref 37–47)
HCT VFR BLD CALC: 29.5 % (ref 37–47)
HCT VFR BLD CALC: 30.8 % (ref 37–47)
HCT VFR BLD CALC: 31.9 % (ref 37–47)
HCT VFR BLD CALC: 32.4 % (ref 37–47)
HCT VFR BLD CALC: 32.5 % (ref 37–47)
HCT VFR BLD CALC: 32.8 % (ref 37–47)
HCT VFR BLD CALC: 33 % (ref 37–47)
HCT VFR BLD CALC: 36 % (ref 37–47)
HDLC SERPL-MCNC: 41 MG/DL (ref 65–121)
HEMOGLOBIN, ART, EXTENDED: 7.9 G/DL (ref 12–16)
HEMOGLOBIN, ART, EXTENDED: 8.6 G/DL (ref 12–16)
HEMOGLOBIN, ART, EXTENDED: 8.8 G/DL (ref 12–16)
HEMOGLOBIN, ART, EXTENDED: 9 G/DL (ref 12–16)
HEMOGLOBIN, ART, EXTENDED: 9.2 G/DL (ref 12–16)
HEMOGLOBIN, ART, EXTENDED: 9.3 G/DL (ref 12–16)
HEMOGLOBIN: 10 G/DL (ref 12–16)
HEMOGLOBIN: 10 G/DL (ref 12–16)
HEMOGLOBIN: 10.2 G/DL (ref 12–16)
HEMOGLOBIN: 11.1 G/DL (ref 12–16)
HEMOGLOBIN: 7.8 G/DL (ref 12–16)
HEMOGLOBIN: 7.9 G/DL (ref 12–16)
HEMOGLOBIN: 8.1 G/DL (ref 12–16)
HEMOGLOBIN: 8.2 G/DL (ref 12–16)
HEMOGLOBIN: 8.3 G/DL (ref 12–16)
HEMOGLOBIN: 8.5 G/DL (ref 12–16)
HEMOGLOBIN: 8.5 G/DL (ref 12–16)
HEMOGLOBIN: 8.7 G/DL (ref 12–16)
HEMOGLOBIN: 9 G/DL (ref 12–16)
HEMOGLOBIN: 9.1 G/DL (ref 12–16)
HEMOGLOBIN: 9.2 G/DL (ref 12–16)
HEMOGLOBIN: 9.8 G/DL (ref 12–16)
HEMOGLOBIN: 9.9 G/DL (ref 12–16)
HEPATITIS B CORE IGM ANTIBODY: NORMAL
HEPATITIS B SURFACE ANTIGEN INTERPRETATION: NORMAL
HEPATITIS C ANTIBODY INTERPRETATION: NORMAL
HYALINE CASTS: 0 /HPF (ref 0–8)
HYALINE CASTS: 1 /HPF (ref 0–8)
INR BLD: 1.12 (ref 0.88–1.18)
INR BLD: 1.17 (ref 0.88–1.18)
INR BLD: 1.22 (ref 0.88–1.18)
IRON SATURATION: 13 % (ref 14–50)
IRON SATURATION: 14 % (ref 14–50)
IRON: 31 UG/DL (ref 37–145)
IRON: 35 UG/DL (ref 37–145)
KETONES, URINE: NEGATIVE MG/DL
LACTIC ACID: 7.8 MMOL/L (ref 0.5–1.9)
LDL CHOLESTEROL CALCULATED: 99 MG/DL
LEUKOCYTE ESTERASE, URINE: ABNORMAL
LEUKOCYTE ESTERASE, URINE: NEGATIVE
LV EF: 35 %
LVEF MODALITY: NORMAL
LYMPHOCYTES ABSOLUTE: 0.5 K/UL (ref 1.1–4.5)
LYMPHOCYTES ABSOLUTE: 0.6 K/UL (ref 1.1–4.5)
LYMPHOCYTES ABSOLUTE: 0.6 K/UL (ref 1.1–4.5)
LYMPHOCYTES ABSOLUTE: 1 K/UL (ref 1.1–4.5)
LYMPHOCYTES ABSOLUTE: 1.2 K/UL (ref 1.1–4.5)
LYMPHOCYTES ABSOLUTE: 1.3 K/UL (ref 1.1–4.5)
LYMPHOCYTES ABSOLUTE: 1.3 K/UL (ref 1.1–4.5)
LYMPHOCYTES ABSOLUTE: 1.4 K/UL (ref 1.1–4.5)
LYMPHOCYTES ABSOLUTE: 1.5 K/UL (ref 1.1–4.5)
LYMPHOCYTES ABSOLUTE: 1.6 K/UL (ref 1.1–4.5)
LYMPHOCYTES RELATIVE PERCENT: 14.4 % (ref 20–40)
LYMPHOCYTES RELATIVE PERCENT: 18.8 % (ref 20–40)
LYMPHOCYTES RELATIVE PERCENT: 19.9 % (ref 20–40)
LYMPHOCYTES RELATIVE PERCENT: 20.3 % (ref 20–40)
LYMPHOCYTES RELATIVE PERCENT: 20.4 % (ref 20–40)
LYMPHOCYTES RELATIVE PERCENT: 24.3 % (ref 20–40)
LYMPHOCYTES RELATIVE PERCENT: 26.1 % (ref 20–40)
LYMPHOCYTES RELATIVE PERCENT: 4.1 % (ref 20–40)
LYMPHOCYTES RELATIVE PERCENT: 6.1 % (ref 20–40)
LYMPHOCYTES RELATIVE PERCENT: 9.9 % (ref 20–40)
MAGNESIUM: 1.5 MG/DL (ref 1.6–2.4)
MAGNESIUM: 1.5 MG/DL (ref 1.6–2.4)
MAGNESIUM: 1.8 MG/DL (ref 1.6–2.4)
MAGNESIUM: 1.8 MG/DL (ref 1.6–2.4)
MAGNESIUM: 1.9 MG/DL (ref 1.6–2.4)
MAGNESIUM: 2.5 MG/DL (ref 1.6–2.4)
MCH RBC QN AUTO: 28.1 PG (ref 27–31)
MCH RBC QN AUTO: 28.2 PG (ref 27–31)
MCH RBC QN AUTO: 28.3 PG (ref 27–31)
MCH RBC QN AUTO: 28.3 PG (ref 27–31)
MCH RBC QN AUTO: 28.6 PG (ref 27–31)
MCH RBC QN AUTO: 28.7 PG (ref 27–31)
MCH RBC QN AUTO: 28.9 PG (ref 27–31)
MCH RBC QN AUTO: 28.9 PG (ref 27–31)
MCH RBC QN AUTO: 29.1 PG (ref 27–31)
MCH RBC QN AUTO: 29.2 PG (ref 27–31)
MCH RBC QN AUTO: 29.3 PG (ref 27–31)
MCH RBC QN AUTO: 29.4 PG (ref 27–31)
MCH RBC QN AUTO: 29.5 PG (ref 27–31)
MCH RBC QN AUTO: 29.5 PG (ref 27–31)
MCH RBC QN AUTO: 30.2 PG (ref 27–31)
MCHC RBC AUTO-ENTMCNC: 28.7 G/DL (ref 33–37)
MCHC RBC AUTO-ENTMCNC: 29.9 G/DL (ref 33–37)
MCHC RBC AUTO-ENTMCNC: 30.2 G/DL (ref 33–37)
MCHC RBC AUTO-ENTMCNC: 30.3 G/DL (ref 33–37)
MCHC RBC AUTO-ENTMCNC: 30.5 G/DL (ref 33–37)
MCHC RBC AUTO-ENTMCNC: 30.5 G/DL (ref 33–37)
MCHC RBC AUTO-ENTMCNC: 30.7 G/DL (ref 33–37)
MCHC RBC AUTO-ENTMCNC: 30.7 G/DL (ref 33–37)
MCHC RBC AUTO-ENTMCNC: 30.8 G/DL (ref 33–37)
MCHC RBC AUTO-ENTMCNC: 30.9 G/DL (ref 33–37)
MCHC RBC AUTO-ENTMCNC: 30.9 G/DL (ref 33–37)
MCHC RBC AUTO-ENTMCNC: 31 G/DL (ref 33–37)
MCHC RBC AUTO-ENTMCNC: 31.1 G/DL (ref 33–37)
MCHC RBC AUTO-ENTMCNC: 31.1 G/DL (ref 33–37)
MCHC RBC AUTO-ENTMCNC: 31.2 G/DL (ref 33–37)
MCHC RBC AUTO-ENTMCNC: 31.7 G/DL (ref 33–37)
MCV RBC AUTO: 90.6 FL (ref 81–99)
MCV RBC AUTO: 91.9 FL (ref 81–99)
MCV RBC AUTO: 92.2 FL (ref 81–99)
MCV RBC AUTO: 92.4 FL (ref 81–99)
MCV RBC AUTO: 92.4 FL (ref 81–99)
MCV RBC AUTO: 92.9 FL (ref 81–99)
MCV RBC AUTO: 93.1 FL (ref 81–99)
MCV RBC AUTO: 94.6 FL (ref 81–99)
MCV RBC AUTO: 94.7 FL (ref 81–99)
MCV RBC AUTO: 94.9 FL (ref 81–99)
MCV RBC AUTO: 95.1 FL (ref 81–99)
MCV RBC AUTO: 95.1 FL (ref 81–99)
MCV RBC AUTO: 95.5 FL (ref 81–99)
MCV RBC AUTO: 95.9 FL (ref 81–99)
MCV RBC AUTO: 96 FL (ref 81–99)
MCV RBC AUTO: 96.5 FL (ref 81–99)
MCV RBC AUTO: 97 FL (ref 81–99)
MCV RBC AUTO: 97.8 FL (ref 81–99)
MCV RBC AUTO: 98.2 FL (ref 81–99)
METHEMOGLOBIN ARTERIAL: 0.6 %
METHEMOGLOBIN ARTERIAL: 1.1 %
METHEMOGLOBIN ARTERIAL: 1.1 %
METHEMOGLOBIN ARTERIAL: 1.3 %
METHEMOGLOBIN ARTERIAL: 1.3 %
METHEMOGLOBIN ARTERIAL: 1.4 %
MICROALBUMIN UR-MCNC: 83.1 MG/DL (ref 0–19)
MICROALBUMIN/CREAT UR-RTO: 2192.6 MG/G
MONOCYTES ABSOLUTE: 0.2 K/UL (ref 0–0.9)
MONOCYTES ABSOLUTE: 0.4 K/UL (ref 0–0.9)
MONOCYTES ABSOLUTE: 0.4 K/UL (ref 0–0.9)
MONOCYTES ABSOLUTE: 0.5 K/UL (ref 0–0.9)
MONOCYTES ABSOLUTE: 0.6 K/UL (ref 0–0.9)
MONOCYTES ABSOLUTE: 0.7 K/UL (ref 0–0.9)
MONOCYTES ABSOLUTE: 0.8 K/UL (ref 0–0.9)
MONOCYTES RELATIVE PERCENT: 10 % (ref 0–10)
MONOCYTES RELATIVE PERCENT: 10.3 % (ref 0–10)
MONOCYTES RELATIVE PERCENT: 4.1 % (ref 0–10)
MONOCYTES RELATIVE PERCENT: 5.2 % (ref 0–10)
MONOCYTES RELATIVE PERCENT: 5.8 % (ref 0–10)
MONOCYTES RELATIVE PERCENT: 6.5 % (ref 0–10)
MONOCYTES RELATIVE PERCENT: 7.9 % (ref 0–10)
MONOCYTES RELATIVE PERCENT: 7.9 % (ref 0–10)
MONOCYTES RELATIVE PERCENT: 8.3 % (ref 0–10)
MONOCYTES RELATIVE PERCENT: 9 % (ref 0–10)
NEUTROPHILS ABSOLUTE: 11.7 K/UL (ref 1.5–7.5)
NEUTROPHILS ABSOLUTE: 3.4 K/UL (ref 1.5–7.5)
NEUTROPHILS ABSOLUTE: 3.7 K/UL (ref 1.5–7.5)
NEUTROPHILS ABSOLUTE: 4 K/UL (ref 1.5–7.5)
NEUTROPHILS ABSOLUTE: 4.2 K/UL (ref 1.5–7.5)
NEUTROPHILS ABSOLUTE: 4.7 K/UL (ref 1.5–7.5)
NEUTROPHILS ABSOLUTE: 4.9 K/UL (ref 1.5–7.5)
NEUTROPHILS ABSOLUTE: 4.9 K/UL (ref 1.5–7.5)
NEUTROPHILS ABSOLUTE: 5 K/UL (ref 1.5–7.5)
NEUTROPHILS ABSOLUTE: 7.7 K/UL (ref 1.5–7.5)
NEUTROPHILS RELATIVE PERCENT: 59.8 % (ref 50–65)
NEUTROPHILS RELATIVE PERCENT: 60 % (ref 50–65)
NEUTROPHILS RELATIVE PERCENT: 64.4 % (ref 50–65)
NEUTROPHILS RELATIVE PERCENT: 67.6 % (ref 50–65)
NEUTROPHILS RELATIVE PERCENT: 68.7 % (ref 50–65)
NEUTROPHILS RELATIVE PERCENT: 70.2 % (ref 50–65)
NEUTROPHILS RELATIVE PERCENT: 73.1 % (ref 50–65)
NEUTROPHILS RELATIVE PERCENT: 83.1 % (ref 50–65)
NEUTROPHILS RELATIVE PERCENT: 84 % (ref 50–65)
NEUTROPHILS RELATIVE PERCENT: 88.4 % (ref 50–65)
NITRITE, URINE: NEGATIVE
O2 CONTENT ARTERIAL: 10.4 ML/DL
O2 CONTENT ARTERIAL: 11.6 ML/DL
O2 CONTENT ARTERIAL: 11.8 ML/DL
O2 CONTENT ARTERIAL: 12 ML/DL
O2 CONTENT ARTERIAL: 12.1 ML/DL
O2 CONTENT ARTERIAL: 12.6 ML/DL
O2 SAT, ARTERIAL: 92.6 %
O2 SAT, ARTERIAL: 93.1 %
O2 SAT, ARTERIAL: 94 %
O2 SAT, ARTERIAL: 94.3 %
O2 SAT, ARTERIAL: 95.2 %
O2 SAT, ARTERIAL: 95.6 %
O2 THERAPY: ABNORMAL
ORGANISM: ABNORMAL
ORGANISM: ABNORMAL
PARATHYROID HORMONE INTACT: 122.4 PG/ML (ref 15–65)
PARATHYROID HORMONE INTACT: 130.7 PG/ML (ref 15–65)
PARATHYROID HORMONE INTACT: 136.2 PG/ML (ref 15–65)
PCO2 ARTERIAL: 32 MMHG (ref 35–45)
PCO2 ARTERIAL: 33 MMHG (ref 35–45)
PCO2 ARTERIAL: 38 MMHG (ref 35–45)
PCO2 ARTERIAL: 38 MMHG (ref 35–45)
PCO2 ARTERIAL: 39 MMHG (ref 35–45)
PCO2 ARTERIAL: 46 MMHG (ref 35–45)
PDW BLD-RTO: 13.2 % (ref 11.5–14.5)
PDW BLD-RTO: 13.6 % (ref 11.5–14.5)
PDW BLD-RTO: 13.9 % (ref 11.5–14.5)
PDW BLD-RTO: 14.1 % (ref 11.5–14.5)
PDW BLD-RTO: 14.2 % (ref 11.5–14.5)
PDW BLD-RTO: 14.2 % (ref 11.5–14.5)
PDW BLD-RTO: 14.3 % (ref 11.5–14.5)
PDW BLD-RTO: 14.5 % (ref 11.5–14.5)
PDW BLD-RTO: 14.5 % (ref 11.5–14.5)
PDW BLD-RTO: 14.6 % (ref 11.5–14.5)
PDW BLD-RTO: 14.7 % (ref 11.5–14.5)
PDW BLD-RTO: 14.7 % (ref 11.5–14.5)
PDW BLD-RTO: 15 % (ref 11.5–14.5)
PDW BLD-RTO: 15.1 % (ref 11.5–14.5)
PDW BLD-RTO: 15.3 % (ref 11.5–14.5)
PDW BLD-RTO: 15.4 % (ref 11.5–14.5)
PDW BLD-RTO: 15.4 % (ref 11.5–14.5)
PERFORMED ON: ABNORMAL
PERFORMED ON: NORMAL
PH ARTERIAL: 7.3 (ref 7.35–7.45)
PH ARTERIAL: 7.4 (ref 7.35–7.45)
PH ARTERIAL: 7.42 (ref 7.35–7.45)
PH ARTERIAL: 7.46 (ref 7.35–7.45)
PH ARTERIAL: 7.49 (ref 7.35–7.45)
PH ARTERIAL: 7.52 (ref 7.35–7.45)
PH UA: 5.5
PH UA: 6
PH UA: 7
PHOSPHORUS: 1.1 MG/DL (ref 2.5–4.5)
PHOSPHORUS: 2.8 MG/DL (ref 2.5–4.5)
PHOSPHORUS: 4.4 MG/DL (ref 2.5–4.5)
PHOSPHORUS: 4.8 MG/DL (ref 2.5–4.5)
PHOSPHORUS: 4.9 MG/DL (ref 2.5–4.5)
PLATELET # BLD: 233 K/UL (ref 130–400)
PLATELET # BLD: 245 K/UL (ref 130–400)
PLATELET # BLD: 246 K/UL (ref 130–400)
PLATELET # BLD: 257 K/UL (ref 130–400)
PLATELET # BLD: 260 K/UL (ref 130–400)
PLATELET # BLD: 265 K/UL (ref 130–400)
PLATELET # BLD: 272 K/UL (ref 130–400)
PLATELET # BLD: 273 K/UL (ref 130–400)
PLATELET # BLD: 287 K/UL (ref 130–400)
PLATELET # BLD: 314 K/UL (ref 130–400)
PLATELET # BLD: 325 K/UL (ref 130–400)
PLATELET # BLD: 336 K/UL (ref 130–400)
PLATELET # BLD: 343 K/UL (ref 130–400)
PLATELET # BLD: 348 K/UL (ref 130–400)
PLATELET # BLD: 349 K/UL (ref 130–400)
PLATELET # BLD: 355 K/UL (ref 130–400)
PLATELET # BLD: 366 K/UL (ref 130–400)
PLATELET SLIDE REVIEW: ADEQUATE
PMV BLD AUTO: 10.6 FL (ref 9.4–12.3)
PMV BLD AUTO: 10.7 FL (ref 9.4–12.3)
PMV BLD AUTO: 10.8 FL (ref 9.4–12.3)
PMV BLD AUTO: 10.9 FL (ref 9.4–12.3)
PMV BLD AUTO: 11 FL (ref 9.4–12.3)
PMV BLD AUTO: 11.1 FL (ref 9.4–12.3)
PMV BLD AUTO: 11.3 FL (ref 9.4–12.3)
PMV BLD AUTO: 11.4 FL (ref 9.4–12.3)
PMV BLD AUTO: 11.5 FL (ref 9.4–12.3)
PMV BLD AUTO: 11.6 FL (ref 9.4–12.3)
PMV BLD AUTO: 11.7 FL (ref 9.4–12.3)
PO2 ARTERIAL: 67 MMHG (ref 80–100)
PO2 ARTERIAL: 67 MMHG (ref 80–100)
PO2 ARTERIAL: 72 MMHG (ref 80–100)
PO2 ARTERIAL: 75 MMHG (ref 80–100)
PO2 ARTERIAL: 76 MMHG (ref 80–100)
PO2 ARTERIAL: 90 MMHG (ref 80–100)
POTASSIUM REFLEX MAGNESIUM: 4 MMOL/L (ref 3.5–5)
POTASSIUM SERPL-SCNC: 3.2 MMOL/L (ref 3.5–5)
POTASSIUM SERPL-SCNC: 3.3 MMOL/L (ref 3.5–5)
POTASSIUM SERPL-SCNC: 3.4 MMOL/L (ref 3.5–5)
POTASSIUM SERPL-SCNC: 3.6 MMOL/L (ref 3.5–5)
POTASSIUM SERPL-SCNC: 3.7 MMOL/L (ref 3.5–5)
POTASSIUM SERPL-SCNC: 3.7 MMOL/L (ref 3.5–5)
POTASSIUM SERPL-SCNC: 3.9 MMOL/L (ref 3.5–5)
POTASSIUM SERPL-SCNC: 4 MMOL/L (ref 3.5–5)
POTASSIUM SERPL-SCNC: 4.2 MMOL/L (ref 3.5–5)
POTASSIUM SERPL-SCNC: 4.3 MMOL/L (ref 3.5–5)
POTASSIUM SERPL-SCNC: 4.4 MMOL/L (ref 3.5–5)
POTASSIUM SERPL-SCNC: 4.5 MMOL/L (ref 3.5–5)
POTASSIUM SERPL-SCNC: 4.6 MMOL/L (ref 3.5–5)
POTASSIUM SERPL-SCNC: 4.7 MMOL/L (ref 3.5–5)
POTASSIUM SERPL-SCNC: 4.8 MMOL/L (ref 3.5–5)
POTASSIUM SERPL-SCNC: 4.8 MMOL/L (ref 3.5–5)
POTASSIUM SERPL-SCNC: 5 MMOL/L (ref 3.5–5)
POTASSIUM SERPL-SCNC: 5.1 MMOL/L (ref 3.5–5)
POTASSIUM SERPL-SCNC: 5.2 MMOL/L (ref 3.5–5)
POTASSIUM, WHOLE BLOOD: 3
POTASSIUM, WHOLE BLOOD: 4.1
POTASSIUM, WHOLE BLOOD: 4.4
POTASSIUM, WHOLE BLOOD: 4.5
POTASSIUM, WHOLE BLOOD: 4.7
POTASSIUM, WHOLE BLOOD: 5.4
PRO-BNP: 7419 PG/ML (ref 0–1800)
PRO-BNP: 7840 PG/ML (ref 0–1800)
PRO-BNP: 8254 PG/ML (ref 0–1800)
PRO-BNP: 8678 PG/ML (ref 0–1800)
PRO-BNP: 8896 PG/ML (ref 0–1800)
PRO-BNP: 9982 PG/ML (ref 0–1800)
PRO-BNP: ABNORMAL PG/ML (ref 0–1800)
PROTEIN PROTEIN: 277 MG/DL (ref 15–45)
PROTEIN UA: 100 MG/DL
PROTEIN UA: 100 MG/DL
PROTEIN UA: 300 MG/DL
PROTHROMBIN TIME: 14.3 SEC (ref 12–14.6)
PROTHROMBIN TIME: 14.8 SEC (ref 12–14.6)
PROTHROMBIN TIME: 15.3 SEC (ref 12–14.6)
RBC # BLD: 2.69 M/UL (ref 4.2–5.4)
RBC # BLD: 2.78 M/UL (ref 4.2–5.4)
RBC # BLD: 2.84 M/UL (ref 4.2–5.4)
RBC # BLD: 2.87 M/UL (ref 4.2–5.4)
RBC # BLD: 2.9 M/UL (ref 4.2–5.4)
RBC # BLD: 2.93 M/UL (ref 4.2–5.4)
RBC # BLD: 2.97 M/UL (ref 4.2–5.4)
RBC # BLD: 3.03 M/UL (ref 4.2–5.4)
RBC # BLD: 3.08 M/UL (ref 4.2–5.4)
RBC # BLD: 3.09 M/UL (ref 4.2–5.4)
RBC # BLD: 3.11 M/UL (ref 4.2–5.4)
RBC # BLD: 3.21 M/UL (ref 4.2–5.4)
RBC # BLD: 3.21 M/UL (ref 4.2–5.4)
RBC # BLD: 3.25 M/UL (ref 4.2–5.4)
RBC # BLD: 3.39 M/UL (ref 4.2–5.4)
RBC # BLD: 3.4 M/UL (ref 4.2–5.4)
RBC # BLD: 3.48 M/UL (ref 4.2–5.4)
RBC # BLD: 3.49 M/UL (ref 4.2–5.4)
RBC # BLD: 3.77 M/UL (ref 4.2–5.4)
RBC UA: 3 /HPF (ref 0–4)
RBC UA: 71 /HPF (ref 0–4)
RBC UA: ABNORMAL /HPF (ref 0–2)
SODIUM BLD-SCNC: 130 MMOL/L (ref 136–145)
SODIUM BLD-SCNC: 131 MMOL/L (ref 136–145)
SODIUM BLD-SCNC: 132 MMOL/L (ref 136–145)
SODIUM BLD-SCNC: 133 MMOL/L (ref 136–145)
SODIUM BLD-SCNC: 133 MMOL/L (ref 136–145)
SODIUM BLD-SCNC: 134 MMOL/L (ref 136–145)
SODIUM BLD-SCNC: 135 MMOL/L (ref 136–145)
SODIUM BLD-SCNC: 136 MMOL/L (ref 136–145)
SODIUM BLD-SCNC: 137 MMOL/L (ref 136–145)
SODIUM BLD-SCNC: 137 MMOL/L (ref 136–145)
SODIUM BLD-SCNC: 138 MMOL/L (ref 136–145)
SODIUM BLD-SCNC: 139 MMOL/L (ref 136–145)
SODIUM BLD-SCNC: 139 MMOL/L (ref 136–145)
SODIUM BLD-SCNC: 140 MMOL/L (ref 136–145)
SODIUM BLD-SCNC: 141 MMOL/L (ref 136–145)
SODIUM BLD-SCNC: 141 MMOL/L (ref 136–145)
SODIUM BLD-SCNC: 142 MMOL/L (ref 136–145)
SODIUM BLD-SCNC: 143 MMOL/L (ref 136–145)
SODIUM BLD-SCNC: 143 MMOL/L (ref 136–145)
SODIUM BLD-SCNC: 145 MMOL/L (ref 136–145)
SODIUM BLD-SCNC: 148 MMOL/L (ref 136–145)
SODIUM URINE: <20 MMOL/L
SPECIFIC GRAVITY UA: 1.01
SPECIFIC GRAVITY UA: 1.02
TOTAL IRON BINDING CAPACITY: 237 UG/DL (ref 250–400)
TOTAL IRON BINDING CAPACITY: 252 UG/DL (ref 250–400)
TOTAL PROTEIN: 5.3 G/DL (ref 6.6–8.7)
TOTAL PROTEIN: 5.4 G/DL (ref 6.6–8.7)
TOTAL PROTEIN: 5.4 G/DL (ref 6.6–8.7)
TOTAL PROTEIN: 5.5 G/DL (ref 6.6–8.7)
TOTAL PROTEIN: 5.6 G/DL (ref 6.6–8.7)
TOTAL PROTEIN: 5.6 G/DL (ref 6.6–8.7)
TOTAL PROTEIN: 5.8 G/DL (ref 6.6–8.7)
TOTAL PROTEIN: 5.8 G/DL (ref 6.6–8.7)
TOTAL PROTEIN: 5.9 G/DL (ref 6.6–8.7)
TOTAL PROTEIN: 6.1 G/DL (ref 6.6–8.7)
TOTAL PROTEIN: 6.3 G/DL (ref 6.6–8.7)
TRIGL SERPL-MCNC: 89 MG/DL (ref 0–149)
TROPONIN: 0.01 NG/ML (ref 0–0.03)
TROPONIN: 0.02 NG/ML (ref 0–0.03)
TROPONIN: 0.03 NG/ML (ref 0–0.03)
TROPONIN: 0.04 NG/ML (ref 0–0.03)
TROPONIN: 0.77 NG/ML (ref 0–0.03)
TROPONIN: 0.91 NG/ML (ref 0–0.03)
TROPONIN: 0.92 NG/ML (ref 0–0.03)
URIC ACID, SERUM: 10.6 MG/DL (ref 2.4–5.7)
URIC ACID, SERUM: 7.7 MG/DL (ref 2.4–5.7)
URIC ACID, SERUM: 8.3 MG/DL (ref 2.4–5.7)
URINE CULTURE, ROUTINE: ABNORMAL
URINE REFLEX TO CULTURE: YES
URINE REFLEX TO CULTURE: YES
UROBILINOGEN, URINE: 0.2 E.U./DL
UROBILINOGEN, URINE: 1 E.U./DL
VITAMIN B-12: 237 PG/ML (ref 211–946)
VITAMIN D 25-HYDROXY: 11.5 NG/ML
VITAMIN D 25-HYDROXY: 13.1 NG/ML
WBC # BLD: 11.8 K/UL (ref 4.8–10.8)
WBC # BLD: 13.3 K/UL (ref 4.8–10.8)
WBC # BLD: 16.7 K/UL (ref 4.8–10.8)
WBC # BLD: 16.9 K/UL (ref 4.8–10.8)
WBC # BLD: 19.2 K/UL (ref 4.8–10.8)
WBC # BLD: 5.7 K/UL (ref 4.8–10.8)
WBC # BLD: 5.8 K/UL (ref 4.8–10.8)
WBC # BLD: 6.1 K/UL (ref 4.8–10.8)
WBC # BLD: 6.1 K/UL (ref 4.8–10.8)
WBC # BLD: 6.2 K/UL (ref 4.8–10.8)
WBC # BLD: 6.2 K/UL (ref 4.8–10.8)
WBC # BLD: 6.7 K/UL (ref 4.8–10.8)
WBC # BLD: 6.8 K/UL (ref 4.8–10.8)
WBC # BLD: 7.1 K/UL (ref 4.8–10.8)
WBC # BLD: 7.3 K/UL (ref 4.8–10.8)
WBC # BLD: 7.3 K/UL (ref 4.8–10.8)
WBC # BLD: 8 K/UL (ref 4.8–10.8)
WBC # BLD: 9.2 K/UL (ref 4.8–10.8)
WBC # BLD: 9.5 K/UL (ref 4.8–10.8)
WBC UA: 1 /HPF (ref 0–5)
WBC UA: 352 /HPF (ref 0–5)
WBC UA: ABNORMAL /HPF (ref 0–5)

## 2018-01-01 PROCEDURE — 6360000002 HC RX W HCPCS: Performed by: INTERNAL MEDICINE

## 2018-01-01 PROCEDURE — 84100 ASSAY OF PHOSPHORUS: CPT

## 2018-01-01 PROCEDURE — 6360000002 HC RX W HCPCS: Performed by: NURSE PRACTITIONER

## 2018-01-01 PROCEDURE — 6360000002 HC RX W HCPCS: Performed by: FAMILY MEDICINE

## 2018-01-01 PROCEDURE — 2700000000 HC OXYGEN THERAPY PER DAY

## 2018-01-01 PROCEDURE — 84484 ASSAY OF TROPONIN QUANT: CPT

## 2018-01-01 PROCEDURE — 99214 OFFICE O/P EST MOD 30 MIN: CPT | Performed by: INTERNAL MEDICINE

## 2018-01-01 PROCEDURE — 1210000000 HC MED SURG R&B

## 2018-01-01 PROCEDURE — C9113 INJ PANTOPRAZOLE SODIUM, VIA: HCPCS | Performed by: INTERNAL MEDICINE

## 2018-01-01 PROCEDURE — 1101F PT FALLS ASSESS-DOCD LE1/YR: CPT | Performed by: NURSE PRACTITIONER

## 2018-01-01 PROCEDURE — 83605 ASSAY OF LACTIC ACID: CPT

## 2018-01-01 PROCEDURE — 85027 COMPLETE CBC AUTOMATED: CPT

## 2018-01-01 PROCEDURE — 97162 PT EVAL MOD COMPLEX 30 MIN: CPT

## 2018-01-01 PROCEDURE — 1111F DSCHRG MED/CURRENT MED MERGE: CPT | Performed by: NURSE PRACTITIONER

## 2018-01-01 PROCEDURE — 80048 BASIC METABOLIC PNL TOTAL CA: CPT

## 2018-01-01 PROCEDURE — 8090000000 HC CONTINUOUS VENOVENOUS HEMOFIL

## 2018-01-01 PROCEDURE — 93005 ELECTROCARDIOGRAM TRACING: CPT

## 2018-01-01 PROCEDURE — 71045 X-RAY EXAM CHEST 1 VIEW: CPT

## 2018-01-01 PROCEDURE — 2140000000 HC CCU INTERMEDIATE R&B

## 2018-01-01 PROCEDURE — 2500000003 HC RX 250 WO HCPCS: Performed by: NURSE PRACTITIONER

## 2018-01-01 PROCEDURE — 2580000003 HC RX 258: Performed by: FAMILY MEDICINE

## 2018-01-01 PROCEDURE — 83970 ASSAY OF PARATHORMONE: CPT

## 2018-01-01 PROCEDURE — 2580000003 HC RX 258: Performed by: NURSE PRACTITIONER

## 2018-01-01 PROCEDURE — G8978 MOBILITY CURRENT STATUS: HCPCS

## 2018-01-01 PROCEDURE — 94640 AIRWAY INHALATION TREATMENT: CPT

## 2018-01-01 PROCEDURE — 94003 VENT MGMT INPAT SUBQ DAY: CPT

## 2018-01-01 PROCEDURE — 99232 SBSQ HOSP IP/OBS MODERATE 35: CPT | Performed by: INTERNAL MEDICINE

## 2018-01-01 PROCEDURE — 84132 ASSAY OF SERUM POTASSIUM: CPT

## 2018-01-01 PROCEDURE — 6370000000 HC RX 637 (ALT 250 FOR IP): Performed by: FAMILY MEDICINE

## 2018-01-01 PROCEDURE — 82948 REAGENT STRIP/BLOOD GLUCOSE: CPT

## 2018-01-01 PROCEDURE — 80162 ASSAY OF DIGOXIN TOTAL: CPT

## 2018-01-01 PROCEDURE — 6360000002 HC RX W HCPCS: Performed by: SURGERY

## 2018-01-01 PROCEDURE — 80053 COMPREHEN METABOLIC PANEL: CPT

## 2018-01-01 PROCEDURE — 99285 EMERGENCY DEPT VISIT HI MDM: CPT

## 2018-01-01 PROCEDURE — 82803 BLOOD GASES ANY COMBINATION: CPT

## 2018-01-01 PROCEDURE — 36415 COLL VENOUS BLD VENIPUNCTURE: CPT

## 2018-01-01 PROCEDURE — C1769 GUIDE WIRE: HCPCS

## 2018-01-01 PROCEDURE — 2580000003 HC RX 258: Performed by: INTERNAL MEDICINE

## 2018-01-01 PROCEDURE — 85610 PROTHROMBIN TIME: CPT

## 2018-01-01 PROCEDURE — 5A2204Z RESTORATION OF CARDIAC RHYTHM, SINGLE: ICD-10-PCS | Performed by: INTERNAL MEDICINE

## 2018-01-01 PROCEDURE — 6370000000 HC RX 637 (ALT 250 FOR IP): Performed by: NURSE PRACTITIONER

## 2018-01-01 PROCEDURE — 6370000000 HC RX 637 (ALT 250 FOR IP): Performed by: INTERNAL MEDICINE

## 2018-01-01 PROCEDURE — 94150 VITAL CAPACITY TEST: CPT

## 2018-01-01 PROCEDURE — 83735 ASSAY OF MAGNESIUM: CPT

## 2018-01-01 PROCEDURE — 83550 IRON BINDING TEST: CPT

## 2018-01-01 PROCEDURE — 2500000003 HC RX 250 WO HCPCS: Performed by: SURGERY

## 2018-01-01 PROCEDURE — 36556 INSERT NON-TUNNEL CV CATH: CPT | Performed by: FAMILY MEDICINE

## 2018-01-01 PROCEDURE — 85025 COMPLETE CBC W/AUTO DIFF WBC: CPT

## 2018-01-01 PROCEDURE — 6370000000 HC RX 637 (ALT 250 FOR IP): Performed by: EMERGENCY MEDICINE

## 2018-01-01 PROCEDURE — 97530 THERAPEUTIC ACTIVITIES: CPT

## 2018-01-01 PROCEDURE — 99152 MOD SED SAME PHYS/QHP 5/>YRS: CPT | Performed by: SURGERY

## 2018-01-01 PROCEDURE — 36592 COLLECT BLOOD FROM PICC: CPT

## 2018-01-01 PROCEDURE — 96375 TX/PRO/DX INJ NEW DRUG ADDON: CPT

## 2018-01-01 PROCEDURE — 36600 WITHDRAWAL OF ARTERIAL BLOOD: CPT

## 2018-01-01 PROCEDURE — 76937 US GUIDE VASCULAR ACCESS: CPT | Performed by: SURGERY

## 2018-01-01 PROCEDURE — 2500000003 HC RX 250 WO HCPCS: Performed by: FAMILY MEDICINE

## 2018-01-01 PROCEDURE — 99213 OFFICE O/P EST LOW 20 MIN: CPT | Performed by: NURSE PRACTITIONER

## 2018-01-01 PROCEDURE — 2100000000 HC CCU R&B

## 2018-01-01 PROCEDURE — 97110 THERAPEUTIC EXERCISES: CPT

## 2018-01-01 PROCEDURE — 92610 EVALUATE SWALLOWING FUNCTION: CPT

## 2018-01-01 PROCEDURE — G0378 HOSPITAL OBSERVATION PER HR: HCPCS

## 2018-01-01 PROCEDURE — 99233 SBSQ HOSP IP/OBS HIGH 50: CPT | Performed by: INTERNAL MEDICINE

## 2018-01-01 PROCEDURE — 1090F PRES/ABSN URINE INCON ASSESS: CPT | Performed by: NURSE PRACTITIONER

## 2018-01-01 PROCEDURE — G8427 DOCREV CUR MEDS BY ELIG CLIN: HCPCS | Performed by: NURSE PRACTITIONER

## 2018-01-01 PROCEDURE — 99231 SBSQ HOSP IP/OBS SF/LOW 25: CPT | Performed by: INTERNAL MEDICINE

## 2018-01-01 PROCEDURE — 83880 ASSAY OF NATRIURETIC PEPTIDE: CPT

## 2018-01-01 PROCEDURE — 86706 HEP B SURFACE ANTIBODY: CPT

## 2018-01-01 PROCEDURE — 99214 OFFICE O/P EST MOD 30 MIN: CPT | Performed by: NURSE PRACTITIONER

## 2018-01-01 PROCEDURE — 31500 INSERT EMERGENCY AIRWAY: CPT

## 2018-01-01 PROCEDURE — G8996 SWALLOW CURRENT STATUS: HCPCS

## 2018-01-01 PROCEDURE — 83540 ASSAY OF IRON: CPT

## 2018-01-01 PROCEDURE — 99153 MOD SED SAME PHYS/QHP EA: CPT | Performed by: SURGERY

## 2018-01-01 PROCEDURE — 2500000003 HC RX 250 WO HCPCS: Performed by: INTERNAL MEDICINE

## 2018-01-01 PROCEDURE — 1123F ACP DISCUSS/DSCN MKR DOCD: CPT | Performed by: NURSE PRACTITIONER

## 2018-01-01 PROCEDURE — G8598 ASA/ANTIPLAT THER USED: HCPCS | Performed by: NURSE PRACTITIONER

## 2018-01-01 PROCEDURE — 99238 HOSP IP/OBS DSCHRG MGMT 30/<: CPT | Performed by: INTERNAL MEDICINE

## 2018-01-01 PROCEDURE — 2580000003 HC RX 258: Performed by: EMERGENCY MEDICINE

## 2018-01-01 PROCEDURE — 96376 TX/PRO/DX INJ SAME DRUG ADON: CPT

## 2018-01-01 PROCEDURE — G8400 PT W/DXA NO RESULTS DOC: HCPCS | Performed by: NURSE PRACTITIONER

## 2018-01-01 PROCEDURE — 93923 UPR/LXTR ART STDY 3+ LVLS: CPT

## 2018-01-01 PROCEDURE — 02HV33Z INSERTION OF INFUSION DEVICE INTO SUPERIOR VENA CAVA, PERCUTANEOUS APPROACH: ICD-10-PCS | Performed by: FAMILY MEDICINE

## 2018-01-01 PROCEDURE — 80074 ACUTE HEPATITIS PANEL: CPT

## 2018-01-01 PROCEDURE — 87186 SC STD MICRODIL/AGAR DIL: CPT

## 2018-01-01 PROCEDURE — 4040F PNEUMOC VAC/ADMIN/RCVD: CPT | Performed by: NURSE PRACTITIONER

## 2018-01-01 PROCEDURE — 87086 URINE CULTURE/COLONY COUNT: CPT

## 2018-01-01 PROCEDURE — 85730 THROMBOPLASTIN TIME PARTIAL: CPT

## 2018-01-01 PROCEDURE — 99285 EMERGENCY DEPT VISIT HI MDM: CPT | Performed by: EMERGENCY MEDICINE

## 2018-01-01 PROCEDURE — 82728 ASSAY OF FERRITIN: CPT

## 2018-01-01 PROCEDURE — 8010000000 HC HEMODIALYSIS ACUTE INPT

## 2018-01-01 PROCEDURE — 93000 ELECTROCARDIOGRAM COMPLETE: CPT | Performed by: INTERNAL MEDICINE

## 2018-01-01 PROCEDURE — G8979 MOBILITY GOAL STATUS: HCPCS

## 2018-01-01 PROCEDURE — 4004F PT TOBACCO SCREEN RCVD TLK: CPT | Performed by: NURSE PRACTITIONER

## 2018-01-01 PROCEDURE — 76770 US EXAM ABDO BACK WALL COMP: CPT

## 2018-01-01 PROCEDURE — 96372 THER/PROPH/DIAG INJ SC/IM: CPT

## 2018-01-01 PROCEDURE — 82746 ASSAY OF FOLIC ACID SERUM: CPT

## 2018-01-01 PROCEDURE — 93306 TTE W/DOPPLER COMPLETE: CPT

## 2018-01-01 PROCEDURE — 82570 ASSAY OF URINE CREATININE: CPT

## 2018-01-01 PROCEDURE — 84550 ASSAY OF BLOOD/URIC ACID: CPT

## 2018-01-01 PROCEDURE — 92526 ORAL FUNCTION THERAPY: CPT

## 2018-01-01 PROCEDURE — 82607 VITAMIN B-12: CPT

## 2018-01-01 PROCEDURE — 93880 EXTRACRANIAL BILAT STUDY: CPT

## 2018-01-01 PROCEDURE — G8420 CALC BMI NORM PARAMETERS: HCPCS | Performed by: NURSE PRACTITIONER

## 2018-01-01 PROCEDURE — 82306 VITAMIN D 25 HYDROXY: CPT

## 2018-01-01 PROCEDURE — 94664 DEMO&/EVAL PT USE INHALER: CPT

## 2018-01-01 PROCEDURE — 94002 VENT MGMT INPAT INIT DAY: CPT

## 2018-01-01 PROCEDURE — 77001 FLUOROGUIDE FOR VEIN DEVICE: CPT | Performed by: SURGERY

## 2018-01-01 PROCEDURE — 99223 1ST HOSP IP/OBS HIGH 75: CPT | Performed by: NURSE PRACTITIONER

## 2018-01-01 PROCEDURE — 5A1945Z RESPIRATORY VENTILATION, 24-96 CONSECUTIVE HOURS: ICD-10-PCS | Performed by: INTERNAL MEDICINE

## 2018-01-01 PROCEDURE — 99223 1ST HOSP IP/OBS HIGH 75: CPT | Performed by: INTERNAL MEDICINE

## 2018-01-01 PROCEDURE — 93000 ELECTROCARDIOGRAM COMPLETE: CPT | Performed by: NURSE PRACTITIONER

## 2018-01-01 PROCEDURE — 02HV33Z INSERTION OF INFUSION DEVICE INTO SUPERIOR VENA CAVA, PERCUTANEOUS APPROACH: ICD-10-PCS | Performed by: SURGERY

## 2018-01-01 PROCEDURE — 99233 SBSQ HOSP IP/OBS HIGH 50: CPT | Performed by: NURSE PRACTITIONER

## 2018-01-01 PROCEDURE — 80061 LIPID PANEL: CPT

## 2018-01-01 PROCEDURE — G8484 FLU IMMUNIZE NO ADMIN: HCPCS | Performed by: NURSE PRACTITIONER

## 2018-01-01 PROCEDURE — 97750 PHYSICAL PERFORMANCE TEST: CPT

## 2018-01-01 PROCEDURE — 81001 URINALYSIS AUTO W/SCOPE: CPT

## 2018-01-01 PROCEDURE — 70450 CT HEAD/BRAIN W/O DYE: CPT

## 2018-01-01 PROCEDURE — 0JH63XZ INSERTION OF TUNNELED VASCULAR ACCESS DEVICE INTO CHEST SUBCUTANEOUS TISSUE AND FASCIA, PERCUTANEOUS APPROACH: ICD-10-PCS | Performed by: SURGERY

## 2018-01-01 PROCEDURE — 36558 INSERT TUNNELED CV CATH: CPT | Performed by: SURGERY

## 2018-01-01 PROCEDURE — 6360000002 HC RX W HCPCS: Performed by: EMERGENCY MEDICINE

## 2018-01-01 PROCEDURE — 2500000003 HC RX 250 WO HCPCS: Performed by: EMERGENCY MEDICINE

## 2018-01-01 PROCEDURE — 0BH17EZ INSERTION OF ENDOTRACHEAL AIRWAY INTO TRACHEA, VIA NATURAL OR ARTIFICIAL OPENING: ICD-10-PCS | Performed by: INTERNAL MEDICINE

## 2018-01-01 PROCEDURE — 5A1D70Z PERFORMANCE OF URINARY FILTRATION, INTERMITTENT, LESS THAN 6 HOURS PER DAY: ICD-10-PCS | Performed by: INTERNAL MEDICINE

## 2018-01-01 PROCEDURE — 82043 UR ALBUMIN QUANTITATIVE: CPT

## 2018-01-01 PROCEDURE — 84300 ASSAY OF URINE SODIUM: CPT

## 2018-01-01 PROCEDURE — G8997 SWALLOW GOAL STATUS: HCPCS

## 2018-01-01 PROCEDURE — 96374 THER/PROPH/DIAG INJ IV PUSH: CPT

## 2018-01-01 PROCEDURE — G0379 DIRECT REFER HOSPITAL OBSERV: HCPCS

## 2018-01-01 PROCEDURE — 92950 HEART/LUNG RESUSCITATION CPR: CPT

## 2018-01-01 RX ORDER — FUROSEMIDE 10 MG/ML
40 INJECTION INTRAMUSCULAR; INTRAVENOUS 2 TIMES DAILY
Status: DISPENSED | OUTPATIENT
Start: 2018-01-01 | End: 2018-01-01

## 2018-01-01 RX ORDER — LORAZEPAM 2 MG/ML
0.5 INJECTION INTRAMUSCULAR EVERY 4 HOURS
Status: DISCONTINUED | OUTPATIENT
Start: 2018-01-01 | End: 2018-01-01

## 2018-01-01 RX ORDER — CILOSTAZOL 50 MG/1
100 TABLET ORAL 2 TIMES DAILY
Status: DISCONTINUED | OUTPATIENT
Start: 2018-01-01 | End: 2018-01-01 | Stop reason: HOSPADM

## 2018-01-01 RX ORDER — 0.9 % SODIUM CHLORIDE 0.9 %
INTRAVENOUS SOLUTION INTRAVENOUS CONTINUOUS PRN
Status: COMPLETED | OUTPATIENT
Start: 2018-01-01 | End: 2018-01-01

## 2018-01-01 RX ORDER — IRON POLYSACCHARIDE COMPLEX 150 MG
150 CAPSULE ORAL DAILY
Status: DISCONTINUED | OUTPATIENT
Start: 2018-01-01 | End: 2018-01-01 | Stop reason: HOSPADM

## 2018-01-01 RX ORDER — SODIUM CHLORIDE 9 MG/ML
INJECTION, SOLUTION INTRAVENOUS CONTINUOUS
Status: DISCONTINUED | OUTPATIENT
Start: 2018-01-01 | End: 2018-01-01

## 2018-01-01 RX ORDER — DOPAMINE HYDROCHLORIDE 160 MG/100ML
10 INJECTION, SOLUTION INTRAVENOUS CONTINUOUS
Status: DISCONTINUED | OUTPATIENT
Start: 2018-01-01 | End: 2018-01-01

## 2018-01-01 RX ORDER — DEXTROSE MONOHYDRATE 50 MG/ML
100 INJECTION, SOLUTION INTRAVENOUS PRN
Status: DISCONTINUED | OUTPATIENT
Start: 2018-01-01 | End: 2018-01-01 | Stop reason: HOSPADM

## 2018-01-01 RX ORDER — AMLODIPINE BESYLATE 5 MG/1
10 TABLET ORAL DAILY
Status: DISCONTINUED | OUTPATIENT
Start: 2018-01-01 | End: 2018-01-01

## 2018-01-01 RX ORDER — DIGOXIN 0.25 MG/ML
250 INJECTION INTRAMUSCULAR; INTRAVENOUS ONCE
Status: COMPLETED | OUTPATIENT
Start: 2018-01-01 | End: 2018-01-01

## 2018-01-01 RX ORDER — ALLOPURINOL 100 MG/1
100 TABLET ORAL DAILY
Qty: 30 TABLET | Refills: 3 | Status: SHIPPED | OUTPATIENT
Start: 2018-01-01

## 2018-01-01 RX ORDER — INSULIN GLARGINE 100 [IU]/ML
20 INJECTION, SOLUTION SUBCUTANEOUS NIGHTLY
Qty: 1 VIAL | Refills: 3 | Status: ON HOLD | OUTPATIENT
Start: 2018-01-01 | End: 2018-01-01

## 2018-01-01 RX ORDER — SCOLOPAMINE TRANSDERMAL SYSTEM 1 MG/1
1 PATCH, EXTENDED RELEASE TRANSDERMAL
Status: DISCONTINUED | OUTPATIENT
Start: 2018-01-01 | End: 2018-01-01 | Stop reason: HOSPADM

## 2018-01-01 RX ORDER — POTASSIUM CHLORIDE 20MEQ/15ML
20 LIQUID (ML) ORAL DAILY
Status: DISCONTINUED | OUTPATIENT
Start: 2018-01-01 | End: 2018-01-01

## 2018-01-01 RX ORDER — HYDROCODONE BITARTRATE AND ACETAMINOPHEN 5; 325 MG/1; MG/1
1 TABLET ORAL ONCE
Status: COMPLETED | OUTPATIENT
Start: 2018-01-01 | End: 2018-01-01

## 2018-01-01 RX ORDER — GABAPENTIN 100 MG/1
100 CAPSULE ORAL DAILY
Status: DISCONTINUED | OUTPATIENT
Start: 2018-01-01 | End: 2018-01-01

## 2018-01-01 RX ORDER — SODIUM CHLORIDE 0.9 % (FLUSH) 0.9 %
10 SYRINGE (ML) INJECTION EVERY 12 HOURS SCHEDULED
Status: DISCONTINUED | OUTPATIENT
Start: 2018-01-01 | End: 2018-01-01 | Stop reason: HOSPADM

## 2018-01-01 RX ORDER — CALCIUM CHLORIDE 100 MG/ML
INJECTION INTRAVENOUS; INTRAVENTRICULAR
Status: COMPLETED | OUTPATIENT
Start: 2018-01-01 | End: 2018-01-01

## 2018-01-01 RX ORDER — BUMETANIDE 1 MG/1
2 TABLET ORAL DAILY
COMMUNITY

## 2018-01-01 RX ORDER — MECLIZINE HYDROCHLORIDE 25 MG/1
25 TABLET ORAL DAILY
Status: DISCONTINUED | OUTPATIENT
Start: 2018-01-01 | End: 2018-01-01 | Stop reason: HOSPADM

## 2018-01-01 RX ORDER — IPRATROPIUM BROMIDE AND ALBUTEROL SULFATE 2.5; .5 MG/3ML; MG/3ML
1 SOLUTION RESPIRATORY (INHALATION) EVERY 4 HOURS
Status: DISCONTINUED | OUTPATIENT
Start: 2018-01-01 | End: 2018-01-01 | Stop reason: HOSPADM

## 2018-01-01 RX ORDER — HEPARIN SODIUM 1000 [USP'U]/ML
3400 INJECTION, SOLUTION INTRAVENOUS; SUBCUTANEOUS PRN
Status: DISCONTINUED | OUTPATIENT
Start: 2018-01-01 | End: 2018-01-01 | Stop reason: HOSPADM

## 2018-01-01 RX ORDER — ALBUMIN (HUMAN) 12.5 G/50ML
12.5 SOLUTION INTRAVENOUS PRN
Status: DISCONTINUED | OUTPATIENT
Start: 2018-01-01 | End: 2018-01-01 | Stop reason: HOSPADM

## 2018-01-01 RX ORDER — CILOSTAZOL 50 MG/1
TABLET ORAL
Qty: 60 TABLET | Refills: 7 | Status: ON HOLD | OUTPATIENT
Start: 2018-01-01 | End: 2018-01-01 | Stop reason: HOSPADM

## 2018-01-01 RX ORDER — MORPHINE SULFATE 1 MG/ML
1 INJECTION, SOLUTION EPIDURAL; INTRATHECAL; INTRAVENOUS
Status: DISCONTINUED | OUTPATIENT
Start: 2018-01-01 | End: 2018-01-01

## 2018-01-01 RX ORDER — GABAPENTIN 600 MG/1
100 TABLET ORAL 2 TIMES DAILY
Status: DISCONTINUED | OUTPATIENT
Start: 2018-01-01 | End: 2018-01-01

## 2018-01-01 RX ORDER — METOLAZONE 2.5 MG/1
2.5 TABLET ORAL ONCE
Status: COMPLETED | OUTPATIENT
Start: 2018-01-01 | End: 2018-01-01

## 2018-01-01 RX ORDER — DEXTROSE MONOHYDRATE 25 G/50ML
25 INJECTION, SOLUTION INTRAVENOUS ONCE
Status: COMPLETED | OUTPATIENT
Start: 2018-01-01 | End: 2018-01-01

## 2018-01-01 RX ORDER — 0.9 % SODIUM CHLORIDE 0.9 %
100 INTRAVENOUS SOLUTION INTRAVENOUS EVERY 30 MIN PRN
Status: DISCONTINUED | OUTPATIENT
Start: 2018-01-01 | End: 2018-01-01 | Stop reason: HOSPADM

## 2018-01-01 RX ORDER — MAGNESIUM SULFATE 1 G/100ML
1 INJECTION INTRAVENOUS ONCE
Status: COMPLETED | OUTPATIENT
Start: 2018-01-01 | End: 2018-01-01

## 2018-01-01 RX ORDER — CHOLECALCIFEROL (VITAMIN D3) 10 MCG
1 TABLET ORAL DAILY
Status: DISCONTINUED | OUTPATIENT
Start: 2018-01-01 | End: 2018-01-01

## 2018-01-01 RX ORDER — ALLOPURINOL 100 MG/1
100 TABLET ORAL DAILY
Status: DISCONTINUED | OUTPATIENT
Start: 2018-01-01 | End: 2018-01-01 | Stop reason: HOSPADM

## 2018-01-01 RX ORDER — FENTANYL CITRATE 50 UG/ML
INJECTION, SOLUTION INTRAMUSCULAR; INTRAVENOUS
Status: COMPLETED | OUTPATIENT
Start: 2018-01-01 | End: 2018-01-01

## 2018-01-01 RX ORDER — SODIUM CHLORIDE 9 MG/ML
INJECTION, SOLUTION INTRAVENOUS CONTINUOUS PRN
Status: COMPLETED | OUTPATIENT
Start: 2018-01-01 | End: 2018-01-01

## 2018-01-01 RX ORDER — GABAPENTIN 300 MG/1
300 CAPSULE ORAL NIGHTLY
Status: DISCONTINUED | OUTPATIENT
Start: 2018-01-01 | End: 2018-01-01

## 2018-01-01 RX ORDER — ONDANSETRON 2 MG/ML
4 INJECTION INTRAMUSCULAR; INTRAVENOUS EVERY 6 HOURS PRN
Status: DISCONTINUED | OUTPATIENT
Start: 2018-01-01 | End: 2018-01-01 | Stop reason: HOSPADM

## 2018-01-01 RX ORDER — BUMETANIDE 0.25 MG/ML
1 INJECTION, SOLUTION INTRAMUSCULAR; INTRAVENOUS EVERY 12 HOURS
Status: DISCONTINUED | OUTPATIENT
Start: 2018-01-01 | End: 2018-01-01

## 2018-01-01 RX ORDER — CILOSTAZOL 100 MG/1
100 TABLET ORAL 2 TIMES DAILY
Status: DISCONTINUED | OUTPATIENT
Start: 2018-01-01 | End: 2018-01-01 | Stop reason: HOSPADM

## 2018-01-01 RX ORDER — INSULIN GLARGINE 100 [IU]/ML
20 INJECTION, SOLUTION SUBCUTANEOUS NIGHTLY
Status: DISCONTINUED | OUTPATIENT
Start: 2018-01-01 | End: 2018-01-01 | Stop reason: HOSPADM

## 2018-01-01 RX ORDER — LABETALOL HYDROCHLORIDE 5 MG/ML
10 INJECTION, SOLUTION INTRAVENOUS ONCE
Status: COMPLETED | OUTPATIENT
Start: 2018-01-01 | End: 2018-01-01

## 2018-01-01 RX ORDER — ALLOPURINOL 100 MG/1
100 TABLET ORAL DAILY
Status: DISCONTINUED | OUTPATIENT
Start: 2018-01-01 | End: 2018-01-01

## 2018-01-01 RX ORDER — INSULIN GLARGINE 100 [IU]/ML
20 INJECTION, SOLUTION SUBCUTANEOUS NIGHTLY
Status: DISCONTINUED | OUTPATIENT
Start: 2018-01-01 | End: 2018-01-01

## 2018-01-01 RX ORDER — SODIUM CHLORIDE 0.9 % (FLUSH) 0.9 %
10 SYRINGE (ML) INJECTION PRN
Status: DISCONTINUED | OUTPATIENT
Start: 2018-01-01 | End: 2018-01-01 | Stop reason: HOSPADM

## 2018-01-01 RX ORDER — DEXTROSE MONOHYDRATE 25 G/50ML
12.5 INJECTION, SOLUTION INTRAVENOUS PRN
Status: DISCONTINUED | OUTPATIENT
Start: 2018-01-01 | End: 2018-01-01 | Stop reason: HOSPADM

## 2018-01-01 RX ORDER — HYDROCODONE BITARTRATE AND ACETAMINOPHEN 7.5; 325 MG/1; MG/1
1 TABLET ORAL EVERY 6 HOURS PRN
Status: DISCONTINUED | OUTPATIENT
Start: 2018-01-01 | End: 2018-01-01 | Stop reason: HOSPADM

## 2018-01-01 RX ORDER — METOLAZONE 2.5 MG/1
2.5 TABLET ORAL PRN
Qty: 15 TABLET | Refills: 0 | Status: SHIPPED | OUTPATIENT
Start: 2018-01-01

## 2018-01-01 RX ORDER — CILOSTAZOL 100 MG/1
100 TABLET ORAL 2 TIMES DAILY
Qty: 60 TABLET | Refills: 3 | Status: SHIPPED | OUTPATIENT
Start: 2018-01-01

## 2018-01-01 RX ORDER — PANTOPRAZOLE SODIUM 40 MG/1
40 TABLET, DELAYED RELEASE ORAL
Status: DISCONTINUED | OUTPATIENT
Start: 2018-01-01 | End: 2018-01-01 | Stop reason: HOSPADM

## 2018-01-01 RX ORDER — BUMETANIDE 0.25 MG/ML
1 INJECTION, SOLUTION INTRAMUSCULAR; INTRAVENOUS 2 TIMES DAILY
Status: DISCONTINUED | OUTPATIENT
Start: 2018-01-01 | End: 2018-01-01

## 2018-01-01 RX ORDER — NICOTINE POLACRILEX 4 MG
15 LOZENGE BUCCAL PRN
Status: DISCONTINUED | OUTPATIENT
Start: 2018-01-01 | End: 2018-01-01 | Stop reason: HOSPADM

## 2018-01-01 RX ORDER — DIGOXIN 250 MCG
250 TABLET ORAL ONCE
Status: DISCONTINUED | OUTPATIENT
Start: 2018-01-01 | End: 2018-01-01

## 2018-01-01 RX ORDER — SODIUM CHLORIDE 9 MG/ML
INJECTION, SOLUTION INTRAVENOUS CONTINUOUS
Status: ACTIVE | OUTPATIENT
Start: 2018-01-01 | End: 2018-01-01

## 2018-01-01 RX ORDER — HEPARIN SODIUM 5000 [USP'U]/ML
INJECTION, SOLUTION INTRAVENOUS; SUBCUTANEOUS
Status: COMPLETED | OUTPATIENT
Start: 2018-01-01 | End: 2018-01-01

## 2018-01-01 RX ORDER — MORPHINE SULFATE 2 MG/ML
1 INJECTION, SOLUTION INTRAMUSCULAR; INTRAVENOUS
Status: DISCONTINUED | OUTPATIENT
Start: 2018-01-01 | End: 2018-01-01 | Stop reason: HOSPADM

## 2018-01-01 RX ORDER — IPRATROPIUM BROMIDE AND ALBUTEROL SULFATE 2.5; .5 MG/3ML; MG/3ML
1 SOLUTION RESPIRATORY (INHALATION) EVERY 4 HOURS PRN
Status: DISCONTINUED | OUTPATIENT
Start: 2018-01-01 | End: 2018-01-01

## 2018-01-01 RX ORDER — LORAZEPAM 2 MG/ML
0.5 INJECTION INTRAMUSCULAR EVERY 4 HOURS PRN
Status: DISCONTINUED | OUTPATIENT
Start: 2018-01-01 | End: 2018-01-01 | Stop reason: HOSPADM

## 2018-01-01 RX ORDER — AMLODIPINE BESYLATE 5 MG/1
5 TABLET ORAL DAILY
Status: DISCONTINUED | OUTPATIENT
Start: 2018-01-01 | End: 2018-01-01

## 2018-01-01 RX ORDER — CHLORHEXIDINE GLUCONATE 4 G/100ML
SOLUTION TOPICAL DAILY PRN
Status: DISCONTINUED | OUTPATIENT
Start: 2018-01-01 | End: 2018-01-01

## 2018-01-01 RX ORDER — 0.9 % SODIUM CHLORIDE 0.9 %
200 INTRAVENOUS SOLUTION INTRAVENOUS PRN
Status: DISCONTINUED | OUTPATIENT
Start: 2018-01-01 | End: 2018-01-01 | Stop reason: HOSPADM

## 2018-01-01 RX ORDER — ATROPINE SULFATE 0.1 MG/ML
0.5 INJECTION INTRAVENOUS ONCE
Status: COMPLETED | OUTPATIENT
Start: 2018-01-01 | End: 2018-01-01

## 2018-01-01 RX ORDER — INSULIN GLARGINE 100 [IU]/ML
10 INJECTION, SOLUTION SUBCUTANEOUS NIGHTLY
Status: DISCONTINUED | OUTPATIENT
Start: 2018-01-01 | End: 2018-01-01

## 2018-01-01 RX ORDER — BUMETANIDE 1 MG/1
1 TABLET ORAL 2 TIMES DAILY
Status: DISCONTINUED | OUTPATIENT
Start: 2018-01-01 | End: 2018-01-01

## 2018-01-01 RX ORDER — CLONIDINE HYDROCHLORIDE 0.1 MG/1
0.1 TABLET ORAL EVERY 4 HOURS PRN
Status: DISCONTINUED | OUTPATIENT
Start: 2018-01-01 | End: 2018-01-01 | Stop reason: HOSPADM

## 2018-01-01 RX ORDER — 0.9 % SODIUM CHLORIDE 0.9 %
10 VIAL (ML) INJECTION DAILY
Status: DISCONTINUED | OUTPATIENT
Start: 2018-01-01 | End: 2018-01-01 | Stop reason: HOSPADM

## 2018-01-01 RX ORDER — CEPHALEXIN 250 MG/1
500 CAPSULE ORAL EVERY 12 HOURS SCHEDULED
Status: DISCONTINUED | OUTPATIENT
Start: 2018-01-01 | End: 2018-01-01 | Stop reason: DRUGHIGH

## 2018-01-01 RX ORDER — IRON POLYSACCHARIDE COMPLEX 150 MG
150 CAPSULE ORAL DAILY
Qty: 30 CAPSULE | Refills: 5 | Status: SHIPPED | OUTPATIENT
Start: 2018-01-01

## 2018-01-01 RX ORDER — HEPARIN SODIUM 5000 [USP'U]/ML
5000 INJECTION, SOLUTION INTRAVENOUS; SUBCUTANEOUS 2 TIMES DAILY
Status: DISCONTINUED | OUTPATIENT
Start: 2018-01-01 | End: 2018-01-01 | Stop reason: HOSPADM

## 2018-01-01 RX ORDER — AMLODIPINE BESYLATE 10 MG/1
10 TABLET ORAL DAILY
Status: DISCONTINUED | OUTPATIENT
Start: 2018-01-01 | End: 2018-01-01 | Stop reason: HOSPADM

## 2018-01-01 RX ORDER — CARVEDILOL 6.25 MG/1
12.5 TABLET ORAL 2 TIMES DAILY WITH MEALS
COMMUNITY
End: 2018-01-01 | Stop reason: DRUGHIGH

## 2018-01-01 RX ORDER — DEXTROSE MONOHYDRATE 50 MG/ML
INJECTION, SOLUTION INTRAVENOUS CONTINUOUS
Status: DISCONTINUED | OUTPATIENT
Start: 2018-01-01 | End: 2018-01-01

## 2018-01-01 RX ORDER — AMIODARONE HYDROCHLORIDE 50 MG/ML
INJECTION, SOLUTION INTRAVENOUS
Status: COMPLETED | OUTPATIENT
Start: 2018-01-01 | End: 2018-01-01

## 2018-01-01 RX ORDER — FUROSEMIDE 10 MG/ML
40 INJECTION INTRAMUSCULAR; INTRAVENOUS ONCE
Status: COMPLETED | OUTPATIENT
Start: 2018-01-01 | End: 2018-01-01

## 2018-01-01 RX ORDER — MORPHINE SULFATE/0.9% NACL/PF 1 MG/ML
2 SYRINGE (ML) INJECTION
Status: DISCONTINUED | OUTPATIENT
Start: 2018-01-01 | End: 2018-01-01

## 2018-01-01 RX ORDER — ACETYLCYSTEINE 200 MG/ML
600 SOLUTION ORAL; RESPIRATORY (INHALATION) 2 TIMES DAILY
Status: DISCONTINUED | OUTPATIENT
Start: 2018-01-01 | End: 2018-01-01

## 2018-01-01 RX ORDER — CEPHALEXIN 250 MG/1
250 CAPSULE ORAL EVERY 12 HOURS SCHEDULED
Qty: 14 CAPSULE | Refills: 0 | Status: ON HOLD | OUTPATIENT
Start: 2018-01-01 | End: 2018-01-01

## 2018-01-01 RX ORDER — CARVEDILOL 12.5 MG/1
6.25 TABLET ORAL 2 TIMES DAILY WITH MEALS
Qty: 60 TABLET | Refills: 3 | Status: SHIPPED | OUTPATIENT
Start: 2018-01-01 | End: 2018-01-01 | Stop reason: SDUPTHER

## 2018-01-01 RX ORDER — HEPARIN SODIUM 5000 [USP'U]/ML
5000 INJECTION, SOLUTION INTRAVENOUS; SUBCUTANEOUS 2 TIMES DAILY
Status: DISCONTINUED | OUTPATIENT
Start: 2018-01-01 | End: 2018-01-01

## 2018-01-01 RX ORDER — VANCOMYCIN HYDROCHLORIDE 1 G/200ML
1000 INJECTION, SOLUTION INTRAVENOUS ONCE
Status: COMPLETED | OUTPATIENT
Start: 2018-01-01 | End: 2018-01-01

## 2018-01-01 RX ORDER — NOREPINEPHRINE BITARTRATE 1 MG/ML
INJECTION, SOLUTION INTRAVENOUS
Status: DISPENSED
Start: 2018-01-01 | End: 2018-01-01

## 2018-01-01 RX ORDER — GABAPENTIN 100 MG/1
CAPSULE ORAL
Qty: 120 CAPSULE | Refills: 5 | Status: ON HOLD | OUTPATIENT
Start: 2018-01-01 | End: 2018-01-01

## 2018-01-01 RX ORDER — ACETYLCYSTEINE 200 MG/ML
600 SOLUTION ORAL; RESPIRATORY (INHALATION) 2 TIMES DAILY
Status: COMPLETED | OUTPATIENT
Start: 2018-01-01 | End: 2018-01-01

## 2018-01-01 RX ORDER — GABAPENTIN 100 MG/1
100 CAPSULE ORAL 2 TIMES DAILY
COMMUNITY

## 2018-01-01 RX ORDER — MAGNESIUM SULFATE HEPTAHYDRATE 500 MG/ML
INJECTION, SOLUTION INTRAMUSCULAR; INTRAVENOUS
Status: COMPLETED | OUTPATIENT
Start: 2018-01-01 | End: 2018-01-01

## 2018-01-01 RX ORDER — METOPROLOL TARTRATE 5 MG/5ML
2.5 INJECTION INTRAVENOUS EVERY 6 HOURS
Status: DISCONTINUED | OUTPATIENT
Start: 2018-01-01 | End: 2018-01-01 | Stop reason: HOSPADM

## 2018-01-01 RX ORDER — CLOPIDOGREL BISULFATE 75 MG/1
75 TABLET ORAL DAILY
Status: DISCONTINUED | OUTPATIENT
Start: 2018-01-01 | End: 2018-01-01 | Stop reason: HOSPADM

## 2018-01-01 RX ORDER — LIDOCAINE HYDROCHLORIDE 20 MG/ML
INJECTION, SOLUTION INFILTRATION; PERINEURAL
Status: COMPLETED | OUTPATIENT
Start: 2018-01-01 | End: 2018-01-01

## 2018-01-01 RX ORDER — CARVEDILOL 12.5 MG/1
12.5 TABLET ORAL 2 TIMES DAILY WITH MEALS
Qty: 60 TABLET | Refills: 3 | Status: SHIPPED | OUTPATIENT
Start: 2018-01-01

## 2018-01-01 RX ORDER — GABAPENTIN 100 MG/1
100 CAPSULE ORAL 2 TIMES DAILY
Status: DISCONTINUED | OUTPATIENT
Start: 2018-01-01 | End: 2018-01-01

## 2018-01-01 RX ORDER — PANTOPRAZOLE SODIUM 40 MG/1
40 TABLET, DELAYED RELEASE ORAL
Status: DISCONTINUED | OUTPATIENT
Start: 2018-01-01 | End: 2018-01-01

## 2018-01-01 RX ORDER — CARVEDILOL 6.25 MG/1
6.25 TABLET ORAL 2 TIMES DAILY WITH MEALS
Qty: 60 TABLET | Refills: 3 | Status: SHIPPED | OUTPATIENT
Start: 2018-01-01 | End: 2018-01-01 | Stop reason: DRUGHIGH

## 2018-01-01 RX ORDER — ERGOCALCIFEROL 1.25 MG/1
50000 CAPSULE ORAL WEEKLY
Status: DISCONTINUED | OUTPATIENT
Start: 2018-01-01 | End: 2018-01-01 | Stop reason: HOSPADM

## 2018-01-01 RX ORDER — GABAPENTIN 300 MG/1
300 CAPSULE ORAL NIGHTLY
Status: DISCONTINUED | OUTPATIENT
Start: 2018-01-01 | End: 2018-01-01 | Stop reason: HOSPADM

## 2018-01-01 RX ORDER — AMOXICILLIN 500 MG/1
500 CAPSULE ORAL EVERY 12 HOURS SCHEDULED
Status: DISCONTINUED | OUTPATIENT
Start: 2018-01-01 | End: 2018-01-01

## 2018-01-01 RX ORDER — ACETAMINOPHEN 325 MG/1
650 TABLET ORAL EVERY 4 HOURS PRN
Status: DISCONTINUED | OUTPATIENT
Start: 2018-01-01 | End: 2018-01-01 | Stop reason: HOSPADM

## 2018-01-01 RX ORDER — CARVEDILOL 12.5 MG/1
12.5 TABLET ORAL 2 TIMES DAILY WITH MEALS
Status: DISCONTINUED | OUTPATIENT
Start: 2018-01-01 | End: 2018-01-01

## 2018-01-01 RX ORDER — ACETYLCYSTEINE 200 MG/ML
600 SOLUTION ORAL; RESPIRATORY (INHALATION) 3 TIMES DAILY
Status: DISCONTINUED | OUTPATIENT
Start: 2018-01-01 | End: 2018-01-01 | Stop reason: HOSPADM

## 2018-01-01 RX ORDER — CARVEDILOL 12.5 MG/1
12.5 TABLET ORAL 2 TIMES DAILY WITH MEALS
Qty: 180 TABLET | Refills: 3 | Status: SHIPPED | OUTPATIENT
Start: 2018-01-01 | End: 2018-01-01 | Stop reason: DRUGHIGH

## 2018-01-01 RX ORDER — CEPHALEXIN 250 MG/1
250 CAPSULE ORAL EVERY 12 HOURS SCHEDULED
Status: DISCONTINUED | OUTPATIENT
Start: 2018-01-01 | End: 2018-01-01 | Stop reason: HOSPADM

## 2018-01-01 RX ORDER — BUMETANIDE 1 MG/1
1 TABLET ORAL DAILY
Status: DISCONTINUED | OUTPATIENT
Start: 2018-01-01 | End: 2018-01-01 | Stop reason: HOSPADM

## 2018-01-01 RX ORDER — MIDAZOLAM HYDROCHLORIDE 1 MG/ML
INJECTION INTRAMUSCULAR; INTRAVENOUS
Status: COMPLETED | OUTPATIENT
Start: 2018-01-01 | End: 2018-01-01

## 2018-01-01 RX ORDER — MORPHINE SULFATE 2 MG/ML
2 INJECTION, SOLUTION INTRAMUSCULAR; INTRAVENOUS
Status: DISCONTINUED | OUTPATIENT
Start: 2018-01-01 | End: 2018-01-01 | Stop reason: HOSPADM

## 2018-01-01 RX ORDER — BUMETANIDE 1 MG/1
1 TABLET ORAL DAILY
Qty: 30 TABLET | Refills: 3 | Status: SHIPPED | OUTPATIENT
Start: 2018-01-01 | End: 2018-01-01 | Stop reason: DRUGHIGH

## 2018-01-01 RX ORDER — 0.9 % SODIUM CHLORIDE 0.9 %
10 VIAL (ML) INJECTION DAILY
Status: DISCONTINUED | OUTPATIENT
Start: 2018-01-01 | End: 2018-01-01

## 2018-01-01 RX ORDER — CARVEDILOL 6.25 MG/1
6.25 TABLET ORAL 2 TIMES DAILY WITH MEALS
Status: DISCONTINUED | OUTPATIENT
Start: 2018-01-01 | End: 2018-01-01 | Stop reason: HOSPADM

## 2018-01-01 RX ORDER — IRON POLYSACCHARIDE COMPLEX 150 MG
150 CAPSULE ORAL DAILY
Status: DISCONTINUED | OUTPATIENT
Start: 2018-01-01 | End: 2018-01-01

## 2018-01-01 RX ORDER — PANTOPRAZOLE SODIUM 40 MG/10ML
40 INJECTION, POWDER, LYOPHILIZED, FOR SOLUTION INTRAVENOUS 2 TIMES DAILY
Status: DISCONTINUED | OUTPATIENT
Start: 2018-01-01 | End: 2018-01-01

## 2018-01-01 RX ORDER — CALCITRIOL 0.25 UG/1
0.25 CAPSULE, LIQUID FILLED ORAL DAILY
Status: DISCONTINUED | OUTPATIENT
Start: 2018-01-01 | End: 2018-01-01 | Stop reason: HOSPADM

## 2018-01-01 RX ORDER — PANTOPRAZOLE SODIUM 40 MG/10ML
40 INJECTION, POWDER, LYOPHILIZED, FOR SOLUTION INTRAVENOUS 2 TIMES DAILY
Status: DISCONTINUED | OUTPATIENT
Start: 2018-01-01 | End: 2018-01-01 | Stop reason: HOSPADM

## 2018-01-01 RX ORDER — GABAPENTIN 100 MG/1
100 CAPSULE ORAL DAILY
Status: DISCONTINUED | OUTPATIENT
Start: 2018-01-01 | End: 2018-01-01 | Stop reason: HOSPADM

## 2018-01-01 RX ORDER — INSULIN GLARGINE 100 [IU]/ML
15 INJECTION, SOLUTION SUBCUTANEOUS NIGHTLY
Status: DISCONTINUED | OUTPATIENT
Start: 2018-01-01 | End: 2018-01-01 | Stop reason: HOSPADM

## 2018-01-01 RX ORDER — ATENOLOL 50 MG/1
50 TABLET ORAL DAILY
Status: DISCONTINUED | OUTPATIENT
Start: 2018-01-01 | End: 2018-01-01

## 2018-01-01 RX ORDER — AMLODIPINE BESYLATE 10 MG/1
10 TABLET ORAL DAILY
Qty: 30 TABLET | Refills: 3 | Status: SHIPPED | OUTPATIENT
Start: 2018-01-01

## 2018-01-01 RX ORDER — BUMETANIDE 1 MG/1
2 TABLET ORAL DAILY
Status: DISCONTINUED | OUTPATIENT
Start: 2018-01-01 | End: 2018-01-01

## 2018-01-01 RX ORDER — DEXTROSE MONOHYDRATE 25 G/50ML
25 INJECTION, SOLUTION INTRAVENOUS ONCE
Status: DISCONTINUED | OUTPATIENT
Start: 2018-01-01 | End: 2018-01-01

## 2018-01-01 RX ORDER — INSULIN GLARGINE 100 [IU]/ML
18 INJECTION, SOLUTION SUBCUTANEOUS NIGHTLY
Status: DISCONTINUED | OUTPATIENT
Start: 2018-01-01 | End: 2018-01-01

## 2018-01-01 RX ORDER — CILOSTAZOL 50 MG/1
50 TABLET ORAL 2 TIMES DAILY
Status: DISCONTINUED | OUTPATIENT
Start: 2018-01-01 | End: 2018-01-01

## 2018-01-01 RX ORDER — POTASSIUM CHLORIDE 20MEQ/15ML
20 LIQUID (ML) ORAL 2 TIMES DAILY
Status: COMPLETED | OUTPATIENT
Start: 2018-01-01 | End: 2018-01-01

## 2018-01-01 RX ORDER — FERROUS SULFATE 325(65) MG
325 TABLET ORAL 2 TIMES DAILY WITH MEALS
Status: DISCONTINUED | OUTPATIENT
Start: 2018-01-01 | End: 2018-01-01

## 2018-01-01 RX ADMIN — CARVEDILOL 12.5 MG: 12.5 TABLET, FILM COATED ORAL at 09:12

## 2018-01-01 RX ADMIN — ENOXAPARIN SODIUM 30 MG: 30 INJECTION SUBCUTANEOUS at 18:10

## 2018-01-01 RX ADMIN — HEPARIN SODIUM 5000 UNITS: 5000 INJECTION, SOLUTION INTRAVENOUS; SUBCUTANEOUS at 10:03

## 2018-01-01 RX ADMIN — Medication 10 ML: at 21:05

## 2018-01-01 RX ADMIN — MECLIZINE HYDROCHLORIDE 25 MG: 25 TABLET ORAL at 08:03

## 2018-01-01 RX ADMIN — HYDROCODONE BITARTRATE AND ACETAMINOPHEN 1 TABLET: 7.5; 325 TABLET ORAL at 05:00

## 2018-01-01 RX ADMIN — CARVEDILOL 12.5 MG: 12.5 TABLET, FILM COATED ORAL at 19:50

## 2018-01-01 RX ADMIN — PANTOPRAZOLE SODIUM 40 MG: 40 TABLET, DELAYED RELEASE ORAL at 05:00

## 2018-01-01 RX ADMIN — PANTOPRAZOLE SODIUM 40 MG: 40 INJECTION, POWDER, FOR SOLUTION INTRAVENOUS at 20:56

## 2018-01-01 RX ADMIN — INSULIN LISPRO 6 UNITS: 100 INJECTION, SOLUTION INTRAVENOUS; SUBCUTANEOUS at 17:16

## 2018-01-01 RX ADMIN — CALCITRIOL 0.25 MCG: 0.25 CAPSULE ORAL at 08:29

## 2018-01-01 RX ADMIN — Medication 10 ML: at 22:34

## 2018-01-01 RX ADMIN — INSULIN LISPRO 2 UNITS: 100 INJECTION, SOLUTION INTRAVENOUS; SUBCUTANEOUS at 17:10

## 2018-01-01 RX ADMIN — INSULIN LISPRO 4 UNITS: 100 INJECTION, SOLUTION INTRAVENOUS; SUBCUTANEOUS at 15:18

## 2018-01-01 RX ADMIN — Medication 2 MG: at 22:39

## 2018-01-01 RX ADMIN — HEPARIN SODIUM 5000 UNITS: 5000 INJECTION, SOLUTION INTRAVENOUS; SUBCUTANEOUS at 20:56

## 2018-01-01 RX ADMIN — CARVEDILOL 12.5 MG: 12.5 TABLET, FILM COATED ORAL at 17:38

## 2018-01-01 RX ADMIN — CILOSTAZOL 100 MG: 100 TABLET ORAL at 09:10

## 2018-01-01 RX ADMIN — CILOSTAZOL 50 MG: 50 TABLET ORAL at 19:41

## 2018-01-01 RX ADMIN — Medication 10 ML: at 12:03

## 2018-01-01 RX ADMIN — AMLODIPINE BESYLATE 10 MG: 10 TABLET ORAL at 11:42

## 2018-01-01 RX ADMIN — IPRATROPIUM BROMIDE AND ALBUTEROL SULFATE 1 AMPULE: .5; 3 SOLUTION RESPIRATORY (INHALATION) at 10:25

## 2018-01-01 RX ADMIN — HEPARIN SODIUM 5000 UNITS: 5000 INJECTION, SOLUTION INTRAVENOUS; SUBCUTANEOUS at 08:27

## 2018-01-01 RX ADMIN — ACETYLCYSTEINE 600 MG: 200 SOLUTION ORAL; RESPIRATORY (INHALATION) at 07:22

## 2018-01-01 RX ADMIN — CILOSTAZOL 100 MG: 100 TABLET ORAL at 09:12

## 2018-01-01 RX ADMIN — LORAZEPAM 0.5 MG: 2 INJECTION INTRAMUSCULAR; INTRAVENOUS at 15:17

## 2018-01-01 RX ADMIN — GABAPENTIN 300 MG: 300 CAPSULE ORAL at 20:55

## 2018-01-01 RX ADMIN — BUMETANIDE 1 MG: 0.25 INJECTION INTRAMUSCULAR; INTRAVENOUS at 05:48

## 2018-01-01 RX ADMIN — CHLORASEPTIC 1 SPRAY: 1.5 LIQUID ORAL at 23:26

## 2018-01-01 RX ADMIN — Medication 10 ML: at 18:00

## 2018-01-01 RX ADMIN — CILOSTAZOL 50 MG: 50 TABLET ORAL at 08:54

## 2018-01-01 RX ADMIN — PANTOPRAZOLE SODIUM 40 MG: 40 TABLET, DELAYED RELEASE ORAL at 06:25

## 2018-01-01 RX ADMIN — AMIODARONE HYDROCHLORIDE 0.5 MG/MIN: 50 INJECTION, SOLUTION INTRAVENOUS at 13:42

## 2018-01-01 RX ADMIN — PANTOPRAZOLE SODIUM 40 MG: 40 INJECTION, POWDER, FOR SOLUTION INTRAVENOUS at 11:41

## 2018-01-01 RX ADMIN — Medication 2 MG: at 09:50

## 2018-01-01 RX ADMIN — POTASSIUM PHOSPHATE, MONOBASIC AND POTASSIUM PHOSPHATE, DIBASIC 15 MMOL: 224; 236 INJECTION, SOLUTION INTRAVENOUS at 10:55

## 2018-01-01 RX ADMIN — HEPARIN SODIUM 5000 UNITS: 5000 INJECTION, SOLUTION INTRAVENOUS; SUBCUTANEOUS at 22:11

## 2018-01-01 RX ADMIN — IPRATROPIUM BROMIDE AND ALBUTEROL SULFATE 1 AMPULE: .5; 3 SOLUTION RESPIRATORY (INHALATION) at 02:34

## 2018-01-01 RX ADMIN — DOPAMINE HYDROCHLORIDE IN DEXTROSE 6 MCG/KG/MIN: 1.6 INJECTION, SOLUTION INTRAVENOUS at 16:52

## 2018-01-01 RX ADMIN — Medication 10 ML: at 08:24

## 2018-01-01 RX ADMIN — CLOPIDOGREL BISULFATE 75 MG: 75 TABLET ORAL at 19:49

## 2018-01-01 RX ADMIN — Medication 10 ML: at 20:56

## 2018-01-01 RX ADMIN — GABAPENTIN 300 MG: 300 CAPSULE ORAL at 21:42

## 2018-01-01 RX ADMIN — PANTOPRAZOLE SODIUM 40 MG: 40 INJECTION, POWDER, FOR SOLUTION INTRAVENOUS at 08:16

## 2018-01-01 RX ADMIN — EPINEPHRINE 1 MG: 0.1 INJECTION INTRACARDIAC; INTRAVENOUS at 11:55

## 2018-01-01 RX ADMIN — MORPHINE SULFATE 1 MG: 2 INJECTION, SOLUTION INTRAMUSCULAR; INTRAVENOUS at 15:18

## 2018-01-01 RX ADMIN — LORAZEPAM 0.5 MG: 2 INJECTION INTRAMUSCULAR; INTRAVENOUS at 22:34

## 2018-01-01 RX ADMIN — Medication 1 MG: at 08:33

## 2018-01-01 RX ADMIN — NEPHROCAP 1 MG: 1 CAP ORAL at 08:29

## 2018-01-01 RX ADMIN — IPRATROPIUM BROMIDE AND ALBUTEROL SULFATE 1 AMPULE: .5; 3 SOLUTION RESPIRATORY (INHALATION) at 10:21

## 2018-01-01 RX ADMIN — Medication 10 ML: at 04:42

## 2018-01-01 RX ADMIN — HEPARIN SODIUM 5000 UNITS: 5000 INJECTION, SOLUTION INTRAVENOUS; SUBCUTANEOUS at 09:02

## 2018-01-01 RX ADMIN — METOPROLOL TARTRATE 2.5 MG: 5 INJECTION, SOLUTION INTRAVENOUS at 14:28

## 2018-01-01 RX ADMIN — Medication 1 G: at 17:08

## 2018-01-01 RX ADMIN — CALCITRIOL 0.25 MCG: 0.25 CAPSULE ORAL at 11:35

## 2018-01-01 RX ADMIN — INSULIN LISPRO 2 UNITS: 100 INJECTION, SOLUTION INTRAVENOUS; SUBCUTANEOUS at 17:38

## 2018-01-01 RX ADMIN — INSULIN LISPRO 6 UNITS: 100 INJECTION, SOLUTION INTRAVENOUS; SUBCUTANEOUS at 12:26

## 2018-01-01 RX ADMIN — CILOSTAZOL 100 MG: 100 TABLET ORAL at 21:11

## 2018-01-01 RX ADMIN — HEPARIN SODIUM 5000 UNITS: 5000 INJECTION, SOLUTION INTRAVENOUS; SUBCUTANEOUS at 23:23

## 2018-01-01 RX ADMIN — INSULIN LISPRO 2 UNITS: 100 INJECTION, SOLUTION INTRAVENOUS; SUBCUTANEOUS at 09:14

## 2018-01-01 RX ADMIN — ACETYLCYSTEINE 600 MG: 200 SOLUTION ORAL; RESPIRATORY (INHALATION) at 06:57

## 2018-01-01 RX ADMIN — GABAPENTIN 100 MG: 100 CAPSULE ORAL at 08:24

## 2018-01-01 RX ADMIN — CARVEDILOL 12.5 MG: 12.5 TABLET, FILM COATED ORAL at 08:24

## 2018-01-01 RX ADMIN — PANTOPRAZOLE SODIUM 40 MG: 40 TABLET, DELAYED RELEASE ORAL at 17:38

## 2018-01-01 RX ADMIN — ACETAMINOPHEN 650 MG: 325 TABLET, FILM COATED ORAL at 04:41

## 2018-01-01 RX ADMIN — HYDROCODONE BITARTRATE AND ACETAMINOPHEN 1 TABLET: 7.5; 325 TABLET ORAL at 14:51

## 2018-01-01 RX ADMIN — ACETAMINOPHEN 650 MG: 325 TABLET, FILM COATED ORAL at 09:10

## 2018-01-01 RX ADMIN — HEPARIN SODIUM 5000 UNITS: 5000 INJECTION, SOLUTION INTRAVENOUS; SUBCUTANEOUS at 09:13

## 2018-01-01 RX ADMIN — AMLODIPINE BESYLATE 10 MG: 5 TABLET ORAL at 10:03

## 2018-01-01 RX ADMIN — ACETAMINOPHEN 650 MG: 325 TABLET, FILM COATED ORAL at 21:42

## 2018-01-01 RX ADMIN — GABAPENTIN 100 MG: 100 CAPSULE ORAL at 19:26

## 2018-01-01 RX ADMIN — Medication 10 ML: at 09:46

## 2018-01-01 RX ADMIN — ALLOPURINOL 100 MG: 100 TABLET ORAL at 08:29

## 2018-01-01 RX ADMIN — METOPROLOL TARTRATE 2.5 MG: 5 INJECTION, SOLUTION INTRAVENOUS at 17:49

## 2018-01-01 RX ADMIN — INSULIN LISPRO 6 UNITS: 100 INJECTION, SOLUTION INTRAVENOUS; SUBCUTANEOUS at 16:59

## 2018-01-01 RX ADMIN — PANTOPRAZOLE SODIUM 40 MG: 40 TABLET, DELAYED RELEASE ORAL at 06:32

## 2018-01-01 RX ADMIN — Medication 10 ML: at 21:15

## 2018-01-01 RX ADMIN — GABAPENTIN 100 MG: 100 CAPSULE ORAL at 10:03

## 2018-01-01 RX ADMIN — HYDROCODONE BITARTRATE AND ACETAMINOPHEN 1 TABLET: 5; 325 TABLET ORAL at 14:00

## 2018-01-01 RX ADMIN — BUMETANIDE 1 MG: 0.25 INJECTION INTRAMUSCULAR; INTRAVENOUS at 10:47

## 2018-01-01 RX ADMIN — INSULIN LISPRO 2 UNITS: 100 INJECTION, SOLUTION INTRAVENOUS; SUBCUTANEOUS at 21:17

## 2018-01-01 RX ADMIN — BUMETANIDE 1 MG: 0.25 INJECTION INTRAMUSCULAR; INTRAVENOUS at 10:44

## 2018-01-01 RX ADMIN — CARVEDILOL 6.25 MG: 6.25 TABLET, FILM COATED ORAL at 18:09

## 2018-01-01 RX ADMIN — INSULIN LISPRO 6 UNITS: 100 INJECTION, SOLUTION INTRAVENOUS; SUBCUTANEOUS at 13:00

## 2018-01-01 RX ADMIN — FERROUS SULFATE TAB 325 MG (65 MG ELEMENTAL FE) 325 MG: 325 (65 FE) TAB at 16:47

## 2018-01-01 RX ADMIN — ONDANSETRON HYDROCHLORIDE 4 MG: 2 INJECTION, SOLUTION INTRAMUSCULAR; INTRAVENOUS at 16:59

## 2018-01-01 RX ADMIN — SODIUM CHLORIDE 1000 ML: 9 INJECTION, SOLUTION INTRAVENOUS at 12:00

## 2018-01-01 RX ADMIN — DOPAMINE HYDROCHLORIDE IN DEXTROSE 10 MCG/KG/MIN: 1.6 INJECTION, SOLUTION INTRAVENOUS at 19:05

## 2018-01-01 RX ADMIN — IPRATROPIUM BROMIDE AND ALBUTEROL SULFATE 1 AMPULE: .5; 3 SOLUTION RESPIRATORY (INHALATION) at 15:06

## 2018-01-01 RX ADMIN — MIDAZOLAM HYDROCHLORIDE 0.5 MG: 1 INJECTION, SOLUTION INTRAMUSCULAR; INTRAVENOUS at 09:21

## 2018-01-01 RX ADMIN — Medication 10 ML: at 05:18

## 2018-01-01 RX ADMIN — HEPARIN SODIUM 5000 UNITS: 5000 INJECTION, SOLUTION INTRAVENOUS; SUBCUTANEOUS at 10:48

## 2018-01-01 RX ADMIN — SODIUM CHLORIDE: 9 INJECTION, SOLUTION INTRAVENOUS at 21:33

## 2018-01-01 RX ADMIN — Medication 2 MG: at 17:54

## 2018-01-01 RX ADMIN — PANTOPRAZOLE SODIUM 40 MG: 40 TABLET, DELAYED RELEASE ORAL at 17:04

## 2018-01-01 RX ADMIN — POTASSIUM CHLORIDE 20 MEQ: 20 SOLUTION ORAL at 21:55

## 2018-01-01 RX ADMIN — Medication 10 ML: at 20:10

## 2018-01-01 RX ADMIN — AMOXICILLIN 500 MG: 500 CAPSULE ORAL at 22:08

## 2018-01-01 RX ADMIN — AMLODIPINE BESYLATE 5 MG: 5 TABLET ORAL at 09:10

## 2018-01-01 RX ADMIN — ACETAMINOPHEN 650 MG: 325 TABLET, FILM COATED ORAL at 00:19

## 2018-01-01 RX ADMIN — DIGOXIN 250 MCG: 250 INJECTION, SOLUTION INTRAMUSCULAR; INTRAVENOUS; PARENTERAL at 15:12

## 2018-01-01 RX ADMIN — BUMETANIDE 1 MG: 1 TABLET ORAL at 08:03

## 2018-01-01 RX ADMIN — Medication 2 MG: at 10:41

## 2018-01-01 RX ADMIN — PANTOPRAZOLE SODIUM 40 MG: 40 INJECTION, POWDER, FOR SOLUTION INTRAVENOUS at 19:37

## 2018-01-01 RX ADMIN — PANTOPRAZOLE SODIUM 40 MG: 40 INJECTION, POWDER, FOR SOLUTION INTRAVENOUS at 18:10

## 2018-01-01 RX ADMIN — CALCIUM CHLORIDE 1 G: 100 INJECTION, SOLUTION INTRAVENOUS; INTRAVENTRICULAR at 11:44

## 2018-01-01 RX ADMIN — MORPHINE SULFATE 1 MG: 2 INJECTION, SOLUTION INTRAMUSCULAR; INTRAVENOUS at 20:13

## 2018-01-01 RX ADMIN — EPINEPHRINE 1 MG: 0.1 INJECTION INTRACARDIAC; INTRAVENOUS at 11:48

## 2018-01-01 RX ADMIN — HYDROCODONE BITARTRATE AND ACETAMINOPHEN 1 TABLET: 7.5; 325 TABLET ORAL at 08:45

## 2018-01-01 RX ADMIN — Medication 10 ML: at 21:17

## 2018-01-01 RX ADMIN — PANTOPRAZOLE SODIUM 40 MG: 40 INJECTION, POWDER, FOR SOLUTION INTRAVENOUS at 19:43

## 2018-01-01 RX ADMIN — ACETAMINOPHEN 650 MG: 325 TABLET, FILM COATED ORAL at 09:13

## 2018-01-01 RX ADMIN — IPRATROPIUM BROMIDE AND ALBUTEROL SULFATE 1 AMPULE: .5; 3 SOLUTION RESPIRATORY (INHALATION) at 22:49

## 2018-01-01 RX ADMIN — GABAPENTIN 100 MG: 100 CAPSULE ORAL at 08:03

## 2018-01-01 RX ADMIN — Medication 10 ML: at 10:47

## 2018-01-01 RX ADMIN — GABAPENTIN 300 MG: 300 CAPSULE ORAL at 21:14

## 2018-01-01 RX ADMIN — CARVEDILOL 12.5 MG: 12.5 TABLET, FILM COATED ORAL at 08:06

## 2018-01-01 RX ADMIN — AMLODIPINE BESYLATE 5 MG: 5 TABLET ORAL at 08:31

## 2018-01-01 RX ADMIN — Medication 10 ML: at 10:45

## 2018-01-01 RX ADMIN — BUMETANIDE 1 MG: 0.25 INJECTION INTRAMUSCULAR; INTRAVENOUS at 20:39

## 2018-01-01 RX ADMIN — IPRATROPIUM BROMIDE AND ALBUTEROL SULFATE 1 AMPULE: .5; 3 SOLUTION RESPIRATORY (INHALATION) at 22:18

## 2018-01-01 RX ADMIN — ACETAMINOPHEN 650 MG: 325 TABLET, FILM COATED ORAL at 05:45

## 2018-01-01 RX ADMIN — DEXTROSE MONOHYDRATE 25 G: 25 INJECTION, SOLUTION INTRAVENOUS at 13:28

## 2018-01-01 RX ADMIN — CILOSTAZOL 100 MG: 100 TABLET ORAL at 23:23

## 2018-01-01 RX ADMIN — CILOSTAZOL 100 MG: 100 TABLET ORAL at 22:13

## 2018-01-01 RX ADMIN — CLOPIDOGREL BISULFATE 75 MG: 75 TABLET ORAL at 08:21

## 2018-01-01 RX ADMIN — GABAPENTIN 300 MG: 300 CAPSULE ORAL at 21:09

## 2018-01-01 RX ADMIN — Medication 1 MG: at 05:38

## 2018-01-01 RX ADMIN — CILOSTAZOL 100 MG: 100 TABLET ORAL at 11:41

## 2018-01-01 RX ADMIN — CALCITRIOL 0.25 MCG: 0.25 CAPSULE ORAL at 10:47

## 2018-01-01 RX ADMIN — Medication 10 ML: at 11:54

## 2018-01-01 RX ADMIN — ACETAMINOPHEN 650 MG: 325 TABLET, FILM COATED ORAL at 08:05

## 2018-01-01 RX ADMIN — PANTOPRAZOLE SODIUM 40 MG: 40 TABLET, DELAYED RELEASE ORAL at 18:13

## 2018-01-01 RX ADMIN — INSULIN LISPRO 2 UNITS: 100 INJECTION, SOLUTION INTRAVENOUS; SUBCUTANEOUS at 21:02

## 2018-01-01 RX ADMIN — Medication 10 ML: at 08:16

## 2018-01-01 RX ADMIN — Medication 10 ML: at 09:05

## 2018-01-01 RX ADMIN — FERROUS SULFATE TAB 325 MG (65 MG ELEMENTAL FE) 325 MG: 325 (65 FE) TAB at 07:44

## 2018-01-01 RX ADMIN — Medication 10 ML: at 01:30

## 2018-01-01 RX ADMIN — INSULIN GLARGINE 18 UNITS: 100 INJECTION, SOLUTION SUBCUTANEOUS at 22:25

## 2018-01-01 RX ADMIN — IPRATROPIUM BROMIDE AND ALBUTEROL SULFATE 1 AMPULE: .5; 3 SOLUTION RESPIRATORY (INHALATION) at 15:13

## 2018-01-01 RX ADMIN — Medication 2 MG: at 15:59

## 2018-01-01 RX ADMIN — GABAPENTIN 300 MG: 300 CAPSULE ORAL at 20:05

## 2018-01-01 RX ADMIN — INSULIN GLARGINE 15 UNITS: 100 INJECTION, SOLUTION SUBCUTANEOUS at 21:12

## 2018-01-01 RX ADMIN — AMIODARONE HYDROCHLORIDE 0.5 MG/MIN: 50 INJECTION, SOLUTION INTRAVENOUS at 00:40

## 2018-01-01 RX ADMIN — INSULIN GLARGINE 18 UNITS: 100 INJECTION, SOLUTION SUBCUTANEOUS at 22:03

## 2018-01-01 RX ADMIN — Medication 10 ML: at 09:13

## 2018-01-01 RX ADMIN — PANTOPRAZOLE SODIUM 40 MG: 40 TABLET, DELAYED RELEASE ORAL at 17:22

## 2018-01-01 RX ADMIN — HEPARIN SODIUM 5000 UNITS: 5000 INJECTION, SOLUTION INTRAVENOUS; SUBCUTANEOUS at 15:20

## 2018-01-01 RX ADMIN — Medication 2 MG: at 22:12

## 2018-01-01 RX ADMIN — ACETAMINOPHEN 650 MG: 325 TABLET ORAL at 15:51

## 2018-01-01 RX ADMIN — CILOSTAZOL 100 MG: 100 TABLET ORAL at 20:57

## 2018-01-01 RX ADMIN — CILOSTAZOL 100 MG: 50 TABLET ORAL at 08:43

## 2018-01-01 RX ADMIN — Medication 2 MG: at 20:18

## 2018-01-01 RX ADMIN — Medication 10 ML: at 20:43

## 2018-01-01 RX ADMIN — INSULIN GLARGINE 20 UNITS: 100 INJECTION, SOLUTION SUBCUTANEOUS at 22:20

## 2018-01-01 RX ADMIN — BUMETANIDE 1 MG: 0.25 INJECTION INTRAMUSCULAR; INTRAVENOUS at 06:07

## 2018-01-01 RX ADMIN — BUMETANIDE 1 MG: 0.25 INJECTION INTRAMUSCULAR; INTRAVENOUS at 14:28

## 2018-01-01 RX ADMIN — MECLIZINE HYDROCHLORIDE 25 MG: 25 TABLET ORAL at 09:44

## 2018-01-01 RX ADMIN — Medication 2 MG: at 11:47

## 2018-01-01 RX ADMIN — SODIUM BICARBONATE 50 MEQ: 84 INJECTION, SOLUTION INTRAVENOUS at 11:43

## 2018-01-01 RX ADMIN — ENOXAPARIN SODIUM 30 MG: 30 INJECTION SUBCUTANEOUS at 18:05

## 2018-01-01 RX ADMIN — IPRATROPIUM BROMIDE AND ALBUTEROL SULFATE 1 AMPULE: .5; 3 SOLUTION RESPIRATORY (INHALATION) at 19:28

## 2018-01-01 RX ADMIN — LORAZEPAM 0.5 MG: 2 INJECTION INTRAMUSCULAR; INTRAVENOUS at 10:51

## 2018-01-01 RX ADMIN — INSULIN GLARGINE 15 UNITS: 100 INJECTION, SOLUTION SUBCUTANEOUS at 21:00

## 2018-01-01 RX ADMIN — METOPROLOL TARTRATE 2.5 MG: 5 INJECTION, SOLUTION INTRAVENOUS at 05:43

## 2018-01-01 RX ADMIN — ACETAMINOPHEN 650 MG: 325 TABLET, FILM COATED ORAL at 14:00

## 2018-01-01 RX ADMIN — INSULIN GLARGINE 20 UNITS: 100 INJECTION, SOLUTION SUBCUTANEOUS at 19:29

## 2018-01-01 RX ADMIN — Medication 2 MG: at 16:29

## 2018-01-01 RX ADMIN — DOPAMINE HYDROCHLORIDE IN DEXTROSE 20 MCG/KG/MIN: 1.6 INJECTION, SOLUTION INTRAVENOUS at 04:56

## 2018-01-01 RX ADMIN — HEPARIN SODIUM 5000 UNITS: 5000 INJECTION, SOLUTION INTRAVENOUS; SUBCUTANEOUS at 21:12

## 2018-01-01 RX ADMIN — HEPARIN SODIUM 5000 UNITS: 5000 INJECTION, SOLUTION INTRAVENOUS; SUBCUTANEOUS at 10:36

## 2018-01-01 RX ADMIN — CILOSTAZOL 100 MG: 100 TABLET ORAL at 10:03

## 2018-01-01 RX ADMIN — ONDANSETRON HYDROCHLORIDE 4 MG: 2 INJECTION, SOLUTION INTRAMUSCULAR; INTRAVENOUS at 07:17

## 2018-01-01 RX ADMIN — Medication 2 MG: at 00:28

## 2018-01-01 RX ADMIN — PANTOPRAZOLE SODIUM 40 MG: 40 INJECTION, POWDER, FOR SOLUTION INTRAVENOUS at 08:05

## 2018-01-01 RX ADMIN — INSULIN LISPRO 2 UNITS: 100 INJECTION, SOLUTION INTRAVENOUS; SUBCUTANEOUS at 11:47

## 2018-01-01 RX ADMIN — CLOPIDOGREL BISULFATE 75 MG: 75 TABLET ORAL at 08:43

## 2018-01-01 RX ADMIN — GABAPENTIN 300 MG: 300 CAPSULE ORAL at 20:36

## 2018-01-01 RX ADMIN — Medication 2 MG: at 03:47

## 2018-01-01 RX ADMIN — IPRATROPIUM BROMIDE AND ALBUTEROL SULFATE 1 AMPULE: .5; 3 SOLUTION RESPIRATORY (INHALATION) at 14:10

## 2018-01-01 RX ADMIN — CEPHALEXIN 250 MG: 250 CAPSULE ORAL at 08:20

## 2018-01-01 RX ADMIN — CALCITRIOL 0.25 MCG: 0.25 CAPSULE ORAL at 08:31

## 2018-01-01 RX ADMIN — AMLODIPINE BESYLATE 10 MG: 10 TABLET ORAL at 08:20

## 2018-01-01 RX ADMIN — CILOSTAZOL 100 MG: 100 TABLET ORAL at 20:16

## 2018-01-01 RX ADMIN — PANTOPRAZOLE SODIUM 40 MG: 40 TABLET, DELAYED RELEASE ORAL at 05:59

## 2018-01-01 RX ADMIN — IPRATROPIUM BROMIDE AND ALBUTEROL SULFATE 1 AMPULE: .5; 3 SOLUTION RESPIRATORY (INHALATION) at 02:08

## 2018-01-01 RX ADMIN — GABAPENTIN 100 MG: 100 CAPSULE ORAL at 09:44

## 2018-01-01 RX ADMIN — Medication 2 MG: at 19:46

## 2018-01-01 RX ADMIN — Medication 10 ML: at 08:21

## 2018-01-01 RX ADMIN — AMIODARONE HYDROCHLORIDE 1 MG/MIN: 50 INJECTION, SOLUTION INTRAVENOUS at 08:21

## 2018-01-01 RX ADMIN — GABAPENTIN 100 MG: 100 CAPSULE ORAL at 08:20

## 2018-01-01 RX ADMIN — HEPARIN SODIUM 5000 UNITS: 5000 INJECTION, SOLUTION INTRAVENOUS; SUBCUTANEOUS at 09:07

## 2018-01-01 RX ADMIN — INSULIN LISPRO 2 UNITS: 100 INJECTION, SOLUTION INTRAVENOUS; SUBCUTANEOUS at 22:10

## 2018-01-01 RX ADMIN — BUMETANIDE 1 MG: 1 TABLET ORAL at 11:42

## 2018-01-01 RX ADMIN — IPRATROPIUM BROMIDE AND ALBUTEROL SULFATE 1 AMPULE: .5; 3 SOLUTION RESPIRATORY (INHALATION) at 14:00

## 2018-01-01 RX ADMIN — Medication 10 ML: at 20:57

## 2018-01-01 RX ADMIN — POTASSIUM CHLORIDE 20 MEQ: 20 SOLUTION ORAL at 08:24

## 2018-01-01 RX ADMIN — ATENOLOL 50 MG: 50 TABLET ORAL at 08:54

## 2018-01-01 RX ADMIN — PANTOPRAZOLE SODIUM 40 MG: 40 INJECTION, POWDER, FOR SOLUTION INTRAVENOUS at 09:10

## 2018-01-01 RX ADMIN — PANTOPRAZOLE SODIUM 40 MG: 40 TABLET, DELAYED RELEASE ORAL at 06:07

## 2018-01-01 RX ADMIN — IPRATROPIUM BROMIDE AND ALBUTEROL SULFATE 1 AMPULE: .5; 3 SOLUTION RESPIRATORY (INHALATION) at 21:58

## 2018-01-01 RX ADMIN — INSULIN LISPRO 2 UNITS: 100 INJECTION, SOLUTION INTRAVENOUS; SUBCUTANEOUS at 18:14

## 2018-01-01 RX ADMIN — BUMETANIDE 2 MG: 1 TABLET ORAL at 08:24

## 2018-01-01 RX ADMIN — IRON SUCROSE 200 MG: 20 INJECTION, SOLUTION INTRAVENOUS at 08:28

## 2018-01-01 RX ADMIN — AMOXICILLIN 500 MG: 500 CAPSULE ORAL at 11:35

## 2018-01-01 RX ADMIN — HEPARIN SODIUM 5000 UNITS: 5000 INJECTION, SOLUTION INTRAVENOUS; SUBCUTANEOUS at 08:32

## 2018-01-01 RX ADMIN — CLOPIDOGREL BISULFATE 75 MG: 75 TABLET ORAL at 08:03

## 2018-01-01 RX ADMIN — NEPHROCAP 1 MG: 1 CAP ORAL at 09:11

## 2018-01-01 RX ADMIN — IRON SUCROSE 200 MG: 20 INJECTION, SOLUTION INTRAVENOUS at 09:10

## 2018-01-01 RX ADMIN — PANTOPRAZOLE SODIUM 40 MG: 40 INJECTION, POWDER, FOR SOLUTION INTRAVENOUS at 20:59

## 2018-01-01 RX ADMIN — ACETAMINOPHEN 650 MG: 325 TABLET, FILM COATED ORAL at 07:16

## 2018-01-01 RX ADMIN — ACETYLCYSTEINE 600 MG: 200 SOLUTION ORAL; RESPIRATORY (INHALATION) at 18:26

## 2018-01-01 RX ADMIN — CILOSTAZOL 100 MG: 100 TABLET ORAL at 21:09

## 2018-01-01 RX ADMIN — FERROUS SULFATE TAB 325 MG (65 MG ELEMENTAL FE) 325 MG: 325 (65 FE) TAB at 16:55

## 2018-01-01 RX ADMIN — ACETAMINOPHEN 650 MG: 325 TABLET, FILM COATED ORAL at 10:32

## 2018-01-01 RX ADMIN — Medication 2 MG: at 05:18

## 2018-01-01 RX ADMIN — Medication 10 ML: at 09:00

## 2018-01-01 RX ADMIN — IPRATROPIUM BROMIDE AND ALBUTEROL SULFATE 1 AMPULE: .5; 3 SOLUTION RESPIRATORY (INHALATION) at 06:56

## 2018-01-01 RX ADMIN — INSULIN GLARGINE 10 UNITS: 100 INJECTION, SOLUTION SUBCUTANEOUS at 20:04

## 2018-01-01 RX ADMIN — ONDANSETRON HYDROCHLORIDE 4 MG: 2 INJECTION, SOLUTION INTRAMUSCULAR; INTRAVENOUS at 21:22

## 2018-01-01 RX ADMIN — FERROUS SULFATE TAB 325 MG (65 MG ELEMENTAL FE) 325 MG: 325 (65 FE) TAB at 08:29

## 2018-01-01 RX ADMIN — Medication 10 ML: at 21:18

## 2018-01-01 RX ADMIN — HEPARIN SODIUM 5000 UNITS: 5000 INJECTION, SOLUTION INTRAVENOUS; SUBCUTANEOUS at 09:10

## 2018-01-01 RX ADMIN — CARVEDILOL 12.5 MG: 12.5 TABLET, FILM COATED ORAL at 17:06

## 2018-01-01 RX ADMIN — Medication 10 ML: at 10:18

## 2018-01-01 RX ADMIN — PANTOPRAZOLE SODIUM 40 MG: 40 TABLET, DELAYED RELEASE ORAL at 06:27

## 2018-01-01 RX ADMIN — HEPARIN SODIUM 5000 UNITS: 5000 INJECTION, SOLUTION INTRAVENOUS; SUBCUTANEOUS at 20:34

## 2018-01-01 RX ADMIN — IPRATROPIUM BROMIDE AND ALBUTEROL SULFATE 1 AMPULE: .5; 3 SOLUTION RESPIRATORY (INHALATION) at 10:49

## 2018-01-01 RX ADMIN — CLOPIDOGREL BISULFATE 75 MG: 75 TABLET ORAL at 12:03

## 2018-01-01 RX ADMIN — IPRATROPIUM BROMIDE AND ALBUTEROL SULFATE 1 AMPULE: .5; 3 SOLUTION RESPIRATORY (INHALATION) at 14:35

## 2018-01-01 RX ADMIN — HEPARIN SODIUM 5000 UNITS: 5000 INJECTION, SOLUTION INTRAVENOUS; SUBCUTANEOUS at 21:00

## 2018-01-01 RX ADMIN — PANTOPRAZOLE SODIUM 40 MG: 40 TABLET, DELAYED RELEASE ORAL at 05:45

## 2018-01-01 RX ADMIN — AMOXICILLIN 500 MG: 500 CAPSULE ORAL at 20:35

## 2018-01-01 RX ADMIN — HEPARIN SODIUM 5000 UNITS: 5000 INJECTION, SOLUTION INTRAVENOUS; SUBCUTANEOUS at 22:34

## 2018-01-01 RX ADMIN — MAGNESIUM SULFATE HEPTAHYDRATE 1 G: 1 INJECTION, SOLUTION INTRAVENOUS at 12:46

## 2018-01-01 RX ADMIN — INSULIN LISPRO 2 UNITS: 100 INJECTION, SOLUTION INTRAVENOUS; SUBCUTANEOUS at 21:15

## 2018-01-01 RX ADMIN — CALCITRIOL 0.25 MCG: 0.25 CAPSULE ORAL at 09:09

## 2018-01-01 RX ADMIN — Medication 10 ML: at 14:39

## 2018-01-01 RX ADMIN — IPRATROPIUM BROMIDE AND ALBUTEROL SULFATE 1 AMPULE: .5; 3 SOLUTION RESPIRATORY (INHALATION) at 02:28

## 2018-01-01 RX ADMIN — ACETAMINOPHEN 650 MG: 325 TABLET, FILM COATED ORAL at 12:54

## 2018-01-01 RX ADMIN — HYDROCODONE BITARTRATE AND ACETAMINOPHEN 1 TABLET: 7.5; 325 TABLET ORAL at 18:05

## 2018-01-01 RX ADMIN — PANTOPRAZOLE SODIUM 40 MG: 40 INJECTION, POWDER, FOR SOLUTION INTRAVENOUS at 22:34

## 2018-01-01 RX ADMIN — BUMETANIDE 1 MG: 0.25 INJECTION INTRAMUSCULAR; INTRAVENOUS at 19:36

## 2018-01-01 RX ADMIN — Medication 2 MG: at 15:28

## 2018-01-01 RX ADMIN — PANTOPRAZOLE SODIUM 40 MG: 40 TABLET, DELAYED RELEASE ORAL at 06:29

## 2018-01-01 RX ADMIN — BUMETANIDE 1 MG: 0.25 INJECTION INTRAMUSCULAR; INTRAVENOUS at 21:00

## 2018-01-01 RX ADMIN — GABAPENTIN 100 MG: 100 CAPSULE ORAL at 09:13

## 2018-01-01 RX ADMIN — BUMETANIDE 1 MG: 0.25 INJECTION INTRAMUSCULAR; INTRAVENOUS at 10:51

## 2018-01-01 RX ADMIN — IPRATROPIUM BROMIDE AND ALBUTEROL SULFATE 1 AMPULE: .5; 3 SOLUTION RESPIRATORY (INHALATION) at 10:41

## 2018-01-01 RX ADMIN — HYDROCODONE BITARTRATE AND ACETAMINOPHEN 1 TABLET: 7.5; 325 TABLET ORAL at 09:47

## 2018-01-01 RX ADMIN — IPRATROPIUM BROMIDE AND ALBUTEROL SULFATE 1 AMPULE: .5; 3 SOLUTION RESPIRATORY (INHALATION) at 18:22

## 2018-01-01 RX ADMIN — CILOSTAZOL 100 MG: 100 TABLET ORAL at 21:14

## 2018-01-01 RX ADMIN — CLOPIDOGREL BISULFATE 75 MG: 75 TABLET ORAL at 08:05

## 2018-01-01 RX ADMIN — AMIODARONE HYDROCHLORIDE 0.5 MG/MIN: 50 INJECTION, SOLUTION INTRAVENOUS at 18:15

## 2018-01-01 RX ADMIN — Medication 2 MG: at 20:26

## 2018-01-01 RX ADMIN — CLOPIDOGREL BISULFATE 75 MG: 75 TABLET ORAL at 09:10

## 2018-01-01 RX ADMIN — INSULIN LISPRO 4 UNITS: 100 INJECTION, SOLUTION INTRAVENOUS; SUBCUTANEOUS at 09:01

## 2018-01-01 RX ADMIN — ACETYLCYSTEINE 600 MG: 200 SOLUTION ORAL; RESPIRATORY (INHALATION) at 14:35

## 2018-01-01 RX ADMIN — CALCITRIOL 0.25 MCG: 0.25 CAPSULE ORAL at 09:13

## 2018-01-01 RX ADMIN — PANTOPRAZOLE SODIUM 40 MG: 40 TABLET, DELAYED RELEASE ORAL at 06:08

## 2018-01-01 RX ADMIN — LORAZEPAM 0.5 MG: 2 INJECTION INTRAMUSCULAR; INTRAVENOUS at 18:19

## 2018-01-01 RX ADMIN — FUROSEMIDE 40 MG: 10 INJECTION, SOLUTION INTRAMUSCULAR; INTRAVENOUS at 13:39

## 2018-01-01 RX ADMIN — CARVEDILOL 12.5 MG: 12.5 TABLET, FILM COATED ORAL at 09:10

## 2018-01-01 RX ADMIN — FENTANYL CITRATE 25 MCG: 50 INJECTION, SOLUTION INTRAMUSCULAR; INTRAVENOUS at 09:07

## 2018-01-01 RX ADMIN — POTASSIUM CHLORIDE 20 MEQ: 20 SOLUTION ORAL at 12:04

## 2018-01-01 RX ADMIN — IPRATROPIUM BROMIDE AND ALBUTEROL SULFATE 1 AMPULE: .5; 3 SOLUTION RESPIRATORY (INHALATION) at 18:26

## 2018-01-01 RX ADMIN — AMOXICILLIN 500 MG: 500 CAPSULE ORAL at 20:05

## 2018-01-01 RX ADMIN — ACETYLCYSTEINE: 200 SOLUTION ORAL; RESPIRATORY (INHALATION) at 18:11

## 2018-01-01 RX ADMIN — Medication 150 MG: at 08:19

## 2018-01-01 RX ADMIN — IPRATROPIUM BROMIDE AND ALBUTEROL SULFATE 1 AMPULE: .5; 3 SOLUTION RESPIRATORY (INHALATION) at 06:20

## 2018-01-01 RX ADMIN — INSULIN LISPRO 6 UNITS: 100 INJECTION, SOLUTION INTRAVENOUS; SUBCUTANEOUS at 12:57

## 2018-01-01 RX ADMIN — FERROUS SULFATE TAB 325 MG (65 MG ELEMENTAL FE) 325 MG: 325 (65 FE) TAB at 08:24

## 2018-01-01 RX ADMIN — AMIODARONE HYDROCHLORIDE 150 MG: 50 INJECTION, SOLUTION INTRAVENOUS at 11:41

## 2018-01-01 RX ADMIN — Medication 1 G: at 08:16

## 2018-01-01 RX ADMIN — HEPARIN SODIUM 5000 UNITS: 5000 INJECTION, SOLUTION INTRAVENOUS; SUBCUTANEOUS at 08:26

## 2018-01-01 RX ADMIN — Medication 150 MG: at 09:13

## 2018-01-01 RX ADMIN — ENOXAPARIN SODIUM 30 MG: 30 INJECTION SUBCUTANEOUS at 17:24

## 2018-01-01 RX ADMIN — PANTOPRAZOLE SODIUM 40 MG: 40 INJECTION, POWDER, FOR SOLUTION INTRAVENOUS at 20:39

## 2018-01-01 RX ADMIN — METOPROLOL TARTRATE 2.5 MG: 5 INJECTION, SOLUTION INTRAVENOUS at 02:44

## 2018-01-01 RX ADMIN — Medication 2 MG: at 17:47

## 2018-01-01 RX ADMIN — Medication 10 ML: at 21:00

## 2018-01-01 RX ADMIN — METOPROLOL TARTRATE 2.5 MG: 5 INJECTION, SOLUTION INTRAVENOUS at 05:18

## 2018-01-01 RX ADMIN — PANTOPRAZOLE SODIUM 40 MG: 40 INJECTION, POWDER, FOR SOLUTION INTRAVENOUS at 21:54

## 2018-01-01 RX ADMIN — BUMETANIDE 1 MG: 1 TABLET ORAL at 12:01

## 2018-01-01 RX ADMIN — AMIODARONE HYDROCHLORIDE 0.5 MG/MIN: 50 INJECTION, SOLUTION INTRAVENOUS at 05:25

## 2018-01-01 RX ADMIN — GABAPENTIN 100 MG: 100 CAPSULE ORAL at 08:44

## 2018-01-01 RX ADMIN — PANTOPRAZOLE SODIUM 40 MG: 40 TABLET, DELAYED RELEASE ORAL at 14:51

## 2018-01-01 RX ADMIN — MAGNESIUM SULFATE HEPTAHYDRATE 2 G: 500 INJECTION, SOLUTION INTRAMUSCULAR; INTRAVENOUS at 11:47

## 2018-01-01 RX ADMIN — CEPHALEXIN 250 MG: 250 CAPSULE ORAL at 09:44

## 2018-01-01 RX ADMIN — FERROUS SULFATE TAB 325 MG (65 MG ELEMENTAL FE) 325 MG: 325 (65 FE) TAB at 16:19

## 2018-01-01 RX ADMIN — CLOPIDOGREL BISULFATE 75 MG: 75 TABLET ORAL at 09:13

## 2018-01-01 RX ADMIN — EPINEPHRINE 1 MG: 0.1 INJECTION INTRACARDIAC; INTRAVENOUS at 12:04

## 2018-01-01 RX ADMIN — Medication 2 MG: at 01:57

## 2018-01-01 RX ADMIN — PANTOPRAZOLE SODIUM 40 MG: 40 INJECTION, POWDER, FOR SOLUTION INTRAVENOUS at 09:14

## 2018-01-01 RX ADMIN — BUMETANIDE 1 MG: 0.25 INJECTION INTRAMUSCULAR; INTRAVENOUS at 08:16

## 2018-01-01 RX ADMIN — SODIUM CHLORIDE: 9 INJECTION, SOLUTION INTRAVENOUS at 21:24

## 2018-01-01 RX ADMIN — AMIODARONE HYDROCHLORIDE 0.5 MG/MIN: 50 INJECTION, SOLUTION INTRAVENOUS at 22:17

## 2018-01-01 RX ADMIN — GABAPENTIN 100 MG: 100 CAPSULE ORAL at 09:10

## 2018-01-01 RX ADMIN — GABAPENTIN 300 MG: 300 CAPSULE ORAL at 22:08

## 2018-01-01 RX ADMIN — IPRATROPIUM BROMIDE AND ALBUTEROL SULFATE 1 AMPULE: .5; 3 SOLUTION RESPIRATORY (INHALATION) at 02:53

## 2018-01-01 RX ADMIN — ACETYLCYSTEINE 600 MG: 200 SOLUTION ORAL; RESPIRATORY (INHALATION) at 14:00

## 2018-01-01 RX ADMIN — Medication 2 MG: at 03:35

## 2018-01-01 RX ADMIN — AMOXICILLIN 500 MG: 500 CAPSULE ORAL at 20:58

## 2018-01-01 RX ADMIN — GABAPENTIN 300 MG: 300 CAPSULE ORAL at 20:58

## 2018-01-01 RX ADMIN — SODIUM BICARBONATE 50 MEQ: 84 INJECTION, SOLUTION INTRAVENOUS at 11:44

## 2018-01-01 RX ADMIN — CARVEDILOL 12.5 MG: 12.5 TABLET, FILM COATED ORAL at 16:47

## 2018-01-01 RX ADMIN — IPRATROPIUM BROMIDE AND ALBUTEROL SULFATE 1 AMPULE: .5; 3 SOLUTION RESPIRATORY (INHALATION) at 15:05

## 2018-01-01 RX ADMIN — Medication 2 MG: at 21:16

## 2018-01-01 RX ADMIN — BUMETANIDE 1 MG: 0.25 INJECTION INTRAMUSCULAR; INTRAVENOUS at 19:44

## 2018-01-01 RX ADMIN — GABAPENTIN 100 MG: 100 CAPSULE ORAL at 08:06

## 2018-01-01 RX ADMIN — HYDROCODONE BITARTRATE AND ACETAMINOPHEN 1 TABLET: 7.5; 325 TABLET ORAL at 01:17

## 2018-01-01 RX ADMIN — METOPROLOL TARTRATE 2.5 MG: 5 INJECTION, SOLUTION INTRAVENOUS at 21:04

## 2018-01-01 RX ADMIN — HEPARIN SODIUM 5000 UNITS: 5000 INJECTION, SOLUTION INTRAVENOUS; SUBCUTANEOUS at 21:11

## 2018-01-01 RX ADMIN — BUMETANIDE 1 MG: 1 TABLET ORAL at 08:43

## 2018-01-01 RX ADMIN — EPINEPHRINE 1 MG: 0.1 INJECTION INTRACARDIAC; INTRAVENOUS at 11:43

## 2018-01-01 RX ADMIN — Medication 2 MG: at 23:52

## 2018-01-01 RX ADMIN — POTASSIUM CHLORIDE 20 MEQ: 20 SOLUTION ORAL at 11:41

## 2018-01-01 RX ADMIN — FERROUS SULFATE TAB 325 MG (65 MG ELEMENTAL FE) 325 MG: 325 (65 FE) TAB at 08:31

## 2018-01-01 RX ADMIN — BUMETANIDE 1 MG: 1 TABLET ORAL at 08:20

## 2018-01-01 RX ADMIN — INSULIN LISPRO 4 UNITS: 100 INJECTION, SOLUTION INTRAVENOUS; SUBCUTANEOUS at 21:47

## 2018-01-01 RX ADMIN — HEPARIN SODIUM 5000 UNITS: 5000 INJECTION, SOLUTION INTRAVENOUS; SUBCUTANEOUS at 22:01

## 2018-01-01 RX ADMIN — Medication 10 ML: at 22:06

## 2018-01-01 RX ADMIN — MORPHINE SULFATE 1 MG: 2 INJECTION, SOLUTION INTRAMUSCULAR; INTRAVENOUS at 10:48

## 2018-01-01 RX ADMIN — ALLOPURINOL 100 MG: 100 TABLET ORAL at 10:03

## 2018-01-01 RX ADMIN — SODIUM BICARBONATE 50 MEQ: 84 INJECTION, SOLUTION INTRAVENOUS at 12:02

## 2018-01-01 RX ADMIN — POTASSIUM CHLORIDE 20 MEQ: 20 SOLUTION ORAL at 09:10

## 2018-01-01 RX ADMIN — CILOSTAZOL 100 MG: 100 TABLET ORAL at 11:35

## 2018-01-01 RX ADMIN — INSULIN LISPRO 4 UNITS: 100 INJECTION, SOLUTION INTRAVENOUS; SUBCUTANEOUS at 17:58

## 2018-01-01 RX ADMIN — GABAPENTIN 100 MG: 100 CAPSULE ORAL at 09:24

## 2018-01-01 RX ADMIN — INSULIN LISPRO 1 UNITS: 100 INJECTION, SOLUTION INTRAVENOUS; SUBCUTANEOUS at 21:12

## 2018-01-01 RX ADMIN — ONDANSETRON HYDROCHLORIDE 4 MG: 2 INJECTION, SOLUTION INTRAMUSCULAR; INTRAVENOUS at 20:26

## 2018-01-01 RX ADMIN — LORAZEPAM 0.5 MG: 2 INJECTION INTRAMUSCULAR; INTRAVENOUS at 03:01

## 2018-01-01 RX ADMIN — NEPHROCAP 1 MG: 1 CAP ORAL at 09:13

## 2018-01-01 RX ADMIN — AMOXICILLIN 500 MG: 500 CAPSULE ORAL at 21:11

## 2018-01-01 RX ADMIN — FUROSEMIDE 40 MG: 10 INJECTION, SOLUTION INTRAMUSCULAR; INTRAVENOUS at 08:54

## 2018-01-01 RX ADMIN — CILOSTAZOL 100 MG: 100 TABLET ORAL at 08:31

## 2018-01-01 RX ADMIN — INSULIN LISPRO 6 UNITS: 100 INJECTION, SOLUTION INTRAVENOUS; SUBCUTANEOUS at 13:29

## 2018-01-01 RX ADMIN — EPINEPHRINE 1 MG: 0.1 INJECTION INTRACARDIAC; INTRAVENOUS at 11:35

## 2018-01-01 RX ADMIN — LORAZEPAM 0.5 MG: 2 INJECTION INTRAMUSCULAR; INTRAVENOUS at 22:44

## 2018-01-01 RX ADMIN — HYDROCODONE BITARTRATE AND ACETAMINOPHEN 1 TABLET: 7.5; 325 TABLET ORAL at 11:02

## 2018-01-01 RX ADMIN — Medication 10 ML: at 09:14

## 2018-01-01 RX ADMIN — GABAPENTIN 100 MG: 100 CAPSULE ORAL at 08:31

## 2018-01-01 RX ADMIN — INSULIN LISPRO 3 UNITS: 100 INJECTION, SOLUTION INTRAVENOUS; SUBCUTANEOUS at 20:03

## 2018-01-01 RX ADMIN — INSULIN GLARGINE 15 UNITS: 100 INJECTION, SOLUTION SUBCUTANEOUS at 20:38

## 2018-01-01 RX ADMIN — VANCOMYCIN HYDROCHLORIDE 1000 MG: 1 INJECTION, SOLUTION INTRAVENOUS at 06:27

## 2018-01-01 RX ADMIN — ACETYLCYSTEINE 600 MG: 200 SOLUTION ORAL; RESPIRATORY (INHALATION) at 06:56

## 2018-01-01 RX ADMIN — AMLODIPINE BESYLATE 5 MG: 5 TABLET ORAL at 09:13

## 2018-01-01 RX ADMIN — ONDANSETRON HYDROCHLORIDE 4 MG: 2 INJECTION, SOLUTION INTRAMUSCULAR; INTRAVENOUS at 12:27

## 2018-01-01 RX ADMIN — MORPHINE SULFATE 1 MG: 2 INJECTION, SOLUTION INTRAMUSCULAR; INTRAVENOUS at 10:15

## 2018-01-01 RX ADMIN — PANTOPRAZOLE SODIUM 40 MG: 40 INJECTION, POWDER, FOR SOLUTION INTRAVENOUS at 10:47

## 2018-01-01 RX ADMIN — Medication 10 ML: at 08:07

## 2018-01-01 RX ADMIN — Medication 2 MG: at 06:40

## 2018-01-01 RX ADMIN — SODIUM CHLORIDE: 9 INJECTION, SOLUTION INTRAVENOUS at 10:23

## 2018-01-01 RX ADMIN — CILOSTAZOL 100 MG: 100 TABLET ORAL at 09:13

## 2018-01-01 RX ADMIN — ACETYLCYSTEINE: 200 SOLUTION ORAL; RESPIRATORY (INHALATION) at 18:22

## 2018-01-01 RX ADMIN — PANTOPRAZOLE SODIUM 40 MG: 40 INJECTION, POWDER, FOR SOLUTION INTRAVENOUS at 07:17

## 2018-01-01 RX ADMIN — Medication 2 MG: at 12:42

## 2018-01-01 RX ADMIN — CLOPIDOGREL BISULFATE 75 MG: 75 TABLET ORAL at 08:25

## 2018-01-01 RX ADMIN — ACETAMINOPHEN 650 MG: 325 TABLET, FILM COATED ORAL at 10:10

## 2018-01-01 RX ADMIN — Medication 150 MG: at 11:17

## 2018-01-01 RX ADMIN — HYDROCODONE BITARTRATE AND ACETAMINOPHEN 1 TABLET: 7.5; 325 TABLET ORAL at 01:52

## 2018-01-01 RX ADMIN — ACETAMINOPHEN 650 MG: 325 TABLET, FILM COATED ORAL at 16:47

## 2018-01-01 RX ADMIN — INSULIN LISPRO 2 UNITS: 100 INJECTION, SOLUTION INTRAVENOUS; SUBCUTANEOUS at 20:19

## 2018-01-01 RX ADMIN — CARVEDILOL 12.5 MG: 12.5 TABLET, FILM COATED ORAL at 08:28

## 2018-01-01 RX ADMIN — IPRATROPIUM BROMIDE AND ALBUTEROL SULFATE 1 AMPULE: .5; 3 SOLUTION RESPIRATORY (INHALATION) at 10:33

## 2018-01-01 RX ADMIN — MECLIZINE HYDROCHLORIDE 25 MG: 25 TABLET ORAL at 12:02

## 2018-01-01 RX ADMIN — Medication 2 MG: at 01:34

## 2018-01-01 RX ADMIN — IPRATROPIUM BROMIDE AND ALBUTEROL SULFATE 1 AMPULE: .5; 3 SOLUTION RESPIRATORY (INHALATION) at 07:13

## 2018-01-01 RX ADMIN — CILOSTAZOL 100 MG: 100 TABLET ORAL at 21:13

## 2018-01-01 RX ADMIN — INSULIN LISPRO 2 UNITS: 100 INJECTION, SOLUTION INTRAVENOUS; SUBCUTANEOUS at 21:00

## 2018-01-01 RX ADMIN — AMLODIPINE BESYLATE 10 MG: 10 TABLET ORAL at 12:02

## 2018-01-01 RX ADMIN — DEXTROSE MONOHYDRATE 12.5 G: 25 INJECTION, SOLUTION INTRAVENOUS at 04:40

## 2018-01-01 RX ADMIN — NEPHROCAP 1 MG: 1 CAP ORAL at 09:10

## 2018-01-01 RX ADMIN — HEPARIN SODIUM 5000 UNITS: 5000 INJECTION, SOLUTION INTRAVENOUS; SUBCUTANEOUS at 11:37

## 2018-01-01 RX ADMIN — ATENOLOL 50 MG: 50 TABLET ORAL at 08:02

## 2018-01-01 RX ADMIN — INSULIN GLARGINE 15 UNITS: 100 INJECTION, SOLUTION SUBCUTANEOUS at 21:17

## 2018-01-01 RX ADMIN — CARVEDILOL 12.5 MG: 12.5 TABLET, FILM COATED ORAL at 08:02

## 2018-01-01 RX ADMIN — METOPROLOL TARTRATE 2.5 MG: 5 INJECTION, SOLUTION INTRAVENOUS at 02:41

## 2018-01-01 RX ADMIN — ALLOPURINOL 100 MG: 100 TABLET ORAL at 09:12

## 2018-01-01 RX ADMIN — DEXTROSE MONOHYDRATE 10 ML/HR: 50 INJECTION, SOLUTION INTRAVENOUS at 17:30

## 2018-01-01 RX ADMIN — INSULIN LISPRO 2 UNITS: 100 INJECTION, SOLUTION INTRAVENOUS; SUBCUTANEOUS at 22:03

## 2018-01-01 RX ADMIN — HEPARIN SODIUM 5000 UNITS: 5000 INJECTION, SOLUTION INTRAVENOUS; SUBCUTANEOUS at 10:50

## 2018-01-01 RX ADMIN — AMOXICILLIN 500 MG: 500 CAPSULE ORAL at 21:42

## 2018-01-01 RX ADMIN — GABAPENTIN 100 MG: 100 CAPSULE ORAL at 22:24

## 2018-01-01 RX ADMIN — IPRATROPIUM BROMIDE AND ALBUTEROL SULFATE 1 AMPULE: .5; 3 SOLUTION RESPIRATORY (INHALATION) at 22:25

## 2018-01-01 RX ADMIN — AMLODIPINE BESYLATE 10 MG: 10 TABLET ORAL at 09:43

## 2018-01-01 RX ADMIN — AMLODIPINE BESYLATE 10 MG: 5 TABLET ORAL at 08:29

## 2018-01-01 RX ADMIN — CLOPIDOGREL BISULFATE 75 MG: 75 TABLET ORAL at 09:02

## 2018-01-01 RX ADMIN — Medication 10 ML: at 21:55

## 2018-01-01 RX ADMIN — CLOPIDOGREL BISULFATE 75 MG: 75 TABLET ORAL at 08:53

## 2018-01-01 RX ADMIN — IRON SUCROSE 200 MG: 20 INJECTION, SOLUTION INTRAVENOUS at 09:13

## 2018-01-01 RX ADMIN — Medication 2 MG: at 16:43

## 2018-01-01 RX ADMIN — ALLOPURINOL 100 MG: 100 TABLET ORAL at 08:25

## 2018-01-01 RX ADMIN — GABAPENTIN 100 MG: 100 CAPSULE ORAL at 07:44

## 2018-01-01 RX ADMIN — GABAPENTIN 300 MG: 300 CAPSULE ORAL at 20:57

## 2018-01-01 RX ADMIN — HEPARIN SODIUM 5000 UNITS: 5000 INJECTION, SOLUTION INTRAVENOUS; SUBCUTANEOUS at 20:35

## 2018-01-01 RX ADMIN — CEPHALEXIN 250 MG: 250 CAPSULE ORAL at 08:44

## 2018-01-01 RX ADMIN — CLOPIDOGREL BISULFATE 75 MG: 75 TABLET ORAL at 11:35

## 2018-01-01 RX ADMIN — HEPARIN SODIUM 5000 UNITS: 5000 INJECTION, SOLUTION INTRAVENOUS; SUBCUTANEOUS at 21:18

## 2018-01-01 RX ADMIN — INSULIN GLARGINE 20 UNITS: 100 INJECTION, SOLUTION SUBCUTANEOUS at 01:24

## 2018-01-01 RX ADMIN — INSULIN GLARGINE 15 UNITS: 100 INJECTION, SOLUTION SUBCUTANEOUS at 21:45

## 2018-01-01 RX ADMIN — METOPROLOL TARTRATE 2.5 MG: 5 INJECTION, SOLUTION INTRAVENOUS at 20:10

## 2018-01-01 RX ADMIN — BUMETANIDE 1 MG: 0.25 INJECTION INTRAMUSCULAR; INTRAVENOUS at 20:56

## 2018-01-01 RX ADMIN — ACETYLCYSTEINE: 200 SOLUTION ORAL; RESPIRATORY (INHALATION) at 19:27

## 2018-01-01 RX ADMIN — HEPARIN SODIUM 5000 UNITS: 5000 INJECTION, SOLUTION INTRAVENOUS; SUBCUTANEOUS at 08:28

## 2018-01-01 RX ADMIN — AMLODIPINE BESYLATE 10 MG: 10 TABLET ORAL at 08:03

## 2018-01-01 RX ADMIN — HEPARIN SODIUM 5000 UNITS: 5000 INJECTION, SOLUTION INTRAVENOUS; SUBCUTANEOUS at 08:16

## 2018-01-01 RX ADMIN — MAGNESIUM SULFATE HEPTAHYDRATE 2 G: 500 INJECTION, SOLUTION INTRAMUSCULAR; INTRAVENOUS at 11:37

## 2018-01-01 RX ADMIN — Medication 10 ML: at 20:42

## 2018-01-01 RX ADMIN — Medication 2 MG: at 05:43

## 2018-01-01 RX ADMIN — METOPROLOL TARTRATE 2.5 MG: 5 INJECTION, SOLUTION INTRAVENOUS at 15:18

## 2018-01-01 RX ADMIN — FENTANYL CITRATE 25 MCG: 50 INJECTION, SOLUTION INTRAMUSCULAR; INTRAVENOUS at 09:21

## 2018-01-01 RX ADMIN — INSULIN GLARGINE 15 UNITS: 100 INJECTION, SOLUTION SUBCUTANEOUS at 11:02

## 2018-01-01 RX ADMIN — METOPROLOL TARTRATE 2.5 MG: 5 INJECTION, SOLUTION INTRAVENOUS at 11:25

## 2018-01-01 RX ADMIN — IPRATROPIUM BROMIDE AND ALBUTEROL SULFATE 1 AMPULE: .5; 3 SOLUTION RESPIRATORY (INHALATION) at 10:45

## 2018-01-01 RX ADMIN — METOPROLOL TARTRATE 2.5 MG: 5 INJECTION, SOLUTION INTRAVENOUS at 04:02

## 2018-01-01 RX ADMIN — INSULIN LISPRO 4 UNITS: 100 INJECTION, SOLUTION INTRAVENOUS; SUBCUTANEOUS at 18:21

## 2018-01-01 RX ADMIN — IPRATROPIUM BROMIDE AND ALBUTEROL SULFATE 1 AMPULE: .5; 3 SOLUTION RESPIRATORY (INHALATION) at 10:14

## 2018-01-01 RX ADMIN — PANTOPRAZOLE SODIUM 40 MG: 40 TABLET, DELAYED RELEASE ORAL at 16:55

## 2018-01-01 RX ADMIN — INSULIN GLARGINE 15 UNITS: 100 INJECTION, SOLUTION SUBCUTANEOUS at 19:45

## 2018-01-01 RX ADMIN — CALCITRIOL 0.25 MCG: 0.25 CAPSULE ORAL at 08:03

## 2018-01-01 RX ADMIN — AMOXICILLIN 500 MG: 500 CAPSULE ORAL at 09:35

## 2018-01-01 RX ADMIN — IPRATROPIUM BROMIDE AND ALBUTEROL SULFATE 1 AMPULE: 2.5; .5 SOLUTION RESPIRATORY (INHALATION) at 00:14

## 2018-01-01 RX ADMIN — Medication 10 ML: at 19:45

## 2018-01-01 RX ADMIN — AMOXICILLIN 500 MG: 500 CAPSULE ORAL at 08:24

## 2018-01-01 RX ADMIN — BUMETANIDE 1 MG: 0.25 INJECTION INTRAMUSCULAR; INTRAVENOUS at 17:40

## 2018-01-01 RX ADMIN — DOPAMINE HYDROCHLORIDE IN DEXTROSE 17 MCG/KG/MIN: 1.6 INJECTION, SOLUTION INTRAVENOUS at 11:52

## 2018-01-01 RX ADMIN — HEPARIN SODIUM 5000 UNITS: 5000 INJECTION, SOLUTION INTRAVENOUS; SUBCUTANEOUS at 08:30

## 2018-01-01 RX ADMIN — LORAZEPAM 0.5 MG: 2 INJECTION INTRAMUSCULAR; INTRAVENOUS at 12:12

## 2018-01-01 RX ADMIN — NEPHROCAP 1 MG: 1 CAP ORAL at 09:12

## 2018-01-01 RX ADMIN — AMLODIPINE BESYLATE 5 MG: 5 TABLET ORAL at 08:24

## 2018-01-01 RX ADMIN — Medication 10 ML: at 20:05

## 2018-01-01 RX ADMIN — AMIODARONE HYDROCHLORIDE 1 MG/MIN: 50 INJECTION, SOLUTION INTRAVENOUS at 12:17

## 2018-01-01 RX ADMIN — CILOSTAZOL 100 MG: 100 TABLET ORAL at 08:02

## 2018-01-01 RX ADMIN — INSULIN LISPRO 3 UNITS: 100 INJECTION, SOLUTION INTRAVENOUS; SUBCUTANEOUS at 20:38

## 2018-01-01 RX ADMIN — EPINEPHRINE 1 MG: 0.1 INJECTION INTRACARDIAC; INTRAVENOUS at 13:48

## 2018-01-01 RX ADMIN — HYDROCODONE BITARTRATE AND ACETAMINOPHEN 1 TABLET: 7.5; 325 TABLET ORAL at 15:03

## 2018-01-01 RX ADMIN — SODIUM CHLORIDE: 9 INJECTION, SOLUTION INTRAVENOUS at 12:15

## 2018-01-01 RX ADMIN — DOPAMINE HYDROCHLORIDE IN DEXTROSE 20 MCG/KG/MIN: 1.6 INJECTION, SOLUTION INTRAVENOUS at 01:13

## 2018-01-01 RX ADMIN — ACETYLCYSTEINE 800 MG: 200 SOLUTION ORAL; RESPIRATORY (INHALATION) at 06:29

## 2018-01-01 RX ADMIN — METOPROLOL TARTRATE 2.5 MG: 5 INJECTION, SOLUTION INTRAVENOUS at 10:48

## 2018-01-01 RX ADMIN — LORAZEPAM 0.5 MG: 2 INJECTION INTRAMUSCULAR; INTRAVENOUS at 21:33

## 2018-01-01 RX ADMIN — CILOSTAZOL 100 MG: 50 TABLET ORAL at 09:44

## 2018-01-01 RX ADMIN — INSULIN LISPRO 2 UNITS: 100 INJECTION, SOLUTION INTRAVENOUS; SUBCUTANEOUS at 20:30

## 2018-01-01 RX ADMIN — METOPROLOL TARTRATE 2.5 MG: 5 INJECTION, SOLUTION INTRAVENOUS at 10:50

## 2018-01-01 RX ADMIN — Medication 2 MG: at 09:27

## 2018-01-01 RX ADMIN — Medication 2 MG: at 03:59

## 2018-01-01 RX ADMIN — Medication 2 MG: at 04:56

## 2018-01-01 RX ADMIN — HYDROCODONE BITARTRATE AND ACETAMINOPHEN 1 TABLET: 7.5; 325 TABLET ORAL at 00:16

## 2018-01-01 RX ADMIN — METOPROLOL TARTRATE 2.5 MG: 5 INJECTION, SOLUTION INTRAVENOUS at 19:32

## 2018-01-01 RX ADMIN — IPRATROPIUM BROMIDE AND ALBUTEROL SULFATE 1 AMPULE: .5; 3 SOLUTION RESPIRATORY (INHALATION) at 19:03

## 2018-01-01 RX ADMIN — Medication 2 MG: at 20:57

## 2018-01-01 RX ADMIN — IPRATROPIUM BROMIDE AND ALBUTEROL SULFATE 1 AMPULE: .5; 3 SOLUTION RESPIRATORY (INHALATION) at 22:41

## 2018-01-01 RX ADMIN — Medication 10 ML: at 08:29

## 2018-01-01 RX ADMIN — ALLOPURINOL 100 MG: 100 TABLET ORAL at 12:46

## 2018-01-01 RX ADMIN — Medication 10 ML: at 21:16

## 2018-01-01 RX ADMIN — Medication 2 MG: at 22:55

## 2018-01-01 RX ADMIN — PANTOPRAZOLE SODIUM 40 MG: 40 INJECTION, POWDER, FOR SOLUTION INTRAVENOUS at 23:45

## 2018-01-01 RX ADMIN — ENOXAPARIN SODIUM 30 MG: 30 INJECTION SUBCUTANEOUS at 17:08

## 2018-01-01 RX ADMIN — CILOSTAZOL 100 MG: 50 TABLET ORAL at 12:01

## 2018-01-01 RX ADMIN — DOPAMINE HYDROCHLORIDE IN DEXTROSE 5 MCG/KG/MIN: 1.6 INJECTION, SOLUTION INTRAVENOUS at 05:07

## 2018-01-01 RX ADMIN — CARVEDILOL 12.5 MG: 12.5 TABLET, FILM COATED ORAL at 16:18

## 2018-01-01 RX ADMIN — FERROUS SULFATE TAB 325 MG (65 MG ELEMENTAL FE) 325 MG: 325 (65 FE) TAB at 09:10

## 2018-01-01 RX ADMIN — IPRATROPIUM BROMIDE AND ALBUTEROL SULFATE 1 AMPULE: .5; 3 SOLUTION RESPIRATORY (INHALATION) at 02:32

## 2018-01-01 RX ADMIN — BUMETANIDE 1 MG: 0.25 INJECTION INTRAMUSCULAR; INTRAVENOUS at 06:22

## 2018-01-01 RX ADMIN — NEPHROCAP 1 MG: 1 CAP ORAL at 08:24

## 2018-01-01 RX ADMIN — METOPROLOL TARTRATE 2.5 MG: 5 INJECTION, SOLUTION INTRAVENOUS at 13:15

## 2018-01-01 RX ADMIN — CLOPIDOGREL BISULFATE 75 MG: 75 TABLET ORAL at 10:03

## 2018-01-01 RX ADMIN — INSULIN GLARGINE 15 UNITS: 100 INJECTION, SOLUTION SUBCUTANEOUS at 20:56

## 2018-01-01 RX ADMIN — Medication 10 ML: at 05:43

## 2018-01-01 RX ADMIN — Medication 2 MG: at 18:42

## 2018-01-01 RX ADMIN — BUMETANIDE 1 MG: 0.25 INJECTION INTRAMUSCULAR; INTRAVENOUS at 09:02

## 2018-01-01 RX ADMIN — Medication 2 MG: at 07:08

## 2018-01-01 RX ADMIN — INSULIN GLARGINE 20 UNITS: 100 INJECTION, SOLUTION SUBCUTANEOUS at 21:12

## 2018-01-01 RX ADMIN — IPRATROPIUM BROMIDE AND ALBUTEROL SULFATE 1 AMPULE: .5; 3 SOLUTION RESPIRATORY (INHALATION) at 14:31

## 2018-01-01 RX ADMIN — HYDROCODONE BITARTRATE AND ACETAMINOPHEN 1 TABLET: 7.5; 325 TABLET ORAL at 08:03

## 2018-01-01 RX ADMIN — ATROPINE SULFATE 0.5 MG: 0.1 INJECTION, SOLUTION ENDOTRACHEAL; INTRAMUSCULAR; INTRAVENOUS; SUBCUTANEOUS at 12:48

## 2018-01-01 RX ADMIN — INSULIN LISPRO 1 UNITS: 100 INJECTION, SOLUTION INTRAVENOUS; SUBCUTANEOUS at 21:00

## 2018-01-01 RX ADMIN — INSULIN LISPRO 2 UNITS: 100 INJECTION, SOLUTION INTRAVENOUS; SUBCUTANEOUS at 09:13

## 2018-01-01 RX ADMIN — ATENOLOL 50 MG: 50 TABLET ORAL at 08:43

## 2018-01-01 RX ADMIN — CARVEDILOL 12.5 MG: 12.5 TABLET, FILM COATED ORAL at 17:22

## 2018-01-01 RX ADMIN — AMLODIPINE BESYLATE 10 MG: 10 TABLET ORAL at 08:43

## 2018-01-01 RX ADMIN — INSULIN LISPRO 4 UNITS: 100 INJECTION, SOLUTION INTRAVENOUS; SUBCUTANEOUS at 17:32

## 2018-01-01 RX ADMIN — ACETAMINOPHEN 650 MG: 325 TABLET, FILM COATED ORAL at 21:27

## 2018-01-01 RX ADMIN — CARVEDILOL 12.5 MG: 12.5 TABLET, FILM COATED ORAL at 11:19

## 2018-01-01 RX ADMIN — ONDANSETRON HYDROCHLORIDE 4 MG: 2 INJECTION, SOLUTION INTRAMUSCULAR; INTRAVENOUS at 08:05

## 2018-01-01 RX ADMIN — BUMETANIDE 1 MG: 0.25 INJECTION INTRAMUSCULAR; INTRAVENOUS at 16:47

## 2018-01-01 RX ADMIN — ACETYLCYSTEINE 600 MG: 200 SOLUTION ORAL; RESPIRATORY (INHALATION) at 15:06

## 2018-01-01 RX ADMIN — LIDOCAINE HYDROCHLORIDE 25 ML: 20 INJECTION, SOLUTION INFILTRATION; PERINEURAL at 09:21

## 2018-01-01 RX ADMIN — FERROUS SULFATE TAB 325 MG (65 MG ELEMENTAL FE) 325 MG: 325 (65 FE) TAB at 17:22

## 2018-01-01 RX ADMIN — Medication 10 ML: at 11:35

## 2018-01-01 RX ADMIN — PANTOPRAZOLE SODIUM 40 MG: 40 INJECTION, POWDER, FOR SOLUTION INTRAVENOUS at 10:35

## 2018-01-01 RX ADMIN — INSULIN LISPRO 4 UNITS: 100 INJECTION, SOLUTION INTRAVENOUS; SUBCUTANEOUS at 09:16

## 2018-01-01 RX ADMIN — GABAPENTIN 300 MG: 300 CAPSULE ORAL at 21:54

## 2018-01-01 RX ADMIN — Medication 10 ML: at 20:18

## 2018-01-01 RX ADMIN — CILOSTAZOL 100 MG: 100 TABLET ORAL at 10:47

## 2018-01-01 RX ADMIN — Medication 10 ML: at 14:40

## 2018-01-01 RX ADMIN — Medication 2 MG: at 10:35

## 2018-01-01 RX ADMIN — BUMETANIDE 1 MG: 0.25 INJECTION INTRAMUSCULAR; INTRAVENOUS at 23:40

## 2018-01-01 RX ADMIN — PANTOPRAZOLE SODIUM 40 MG: 40 INJECTION, POWDER, FOR SOLUTION INTRAVENOUS at 20:26

## 2018-01-01 RX ADMIN — HEPARIN SODIUM 5000 UNITS: 5000 INJECTION, SOLUTION INTRAVENOUS; SUBCUTANEOUS at 21:14

## 2018-01-01 RX ADMIN — IPRATROPIUM BROMIDE AND ALBUTEROL SULFATE 1 AMPULE: .5; 3 SOLUTION RESPIRATORY (INHALATION) at 22:21

## 2018-01-01 RX ADMIN — DOPAMINE HYDROCHLORIDE IN DEXTROSE 5 MCG/KG/MIN: 1.6 INJECTION, SOLUTION INTRAVENOUS at 13:24

## 2018-01-01 RX ADMIN — INSULIN LISPRO 2 UNITS: 100 INJECTION, SOLUTION INTRAVENOUS; SUBCUTANEOUS at 08:54

## 2018-01-01 RX ADMIN — POTASSIUM CHLORIDE 20 MEQ: 20 SOLUTION ORAL at 09:18

## 2018-01-01 RX ADMIN — POTASSIUM CHLORIDE 20 MEQ: 20 SOLUTION ORAL at 08:05

## 2018-01-01 RX ADMIN — AMIODARONE HYDROCHLORIDE 0.5 MG/MIN: 50 INJECTION, SOLUTION INTRAVENOUS at 08:35

## 2018-01-01 RX ADMIN — CILOSTAZOL 100 MG: 50 TABLET ORAL at 22:24

## 2018-01-01 RX ADMIN — CARVEDILOL 12.5 MG: 12.5 TABLET, FILM COATED ORAL at 16:00

## 2018-01-01 RX ADMIN — INSULIN GLARGINE 15 UNITS: 100 INJECTION, SOLUTION SUBCUTANEOUS at 22:10

## 2018-01-01 RX ADMIN — Medication 2 MG: at 20:43

## 2018-01-01 RX ADMIN — AMLODIPINE BESYLATE 10 MG: 5 TABLET ORAL at 09:11

## 2018-01-01 RX ADMIN — Medication 10 ML: at 22:17

## 2018-01-01 RX ADMIN — ALLOPURINOL 100 MG: 100 TABLET ORAL at 09:13

## 2018-01-01 RX ADMIN — CEPHALEXIN 250 MG: 250 CAPSULE ORAL at 19:26

## 2018-01-01 RX ADMIN — Medication 2 MG: at 09:10

## 2018-01-01 RX ADMIN — IPRATROPIUM BROMIDE AND ALBUTEROL SULFATE 1 AMPULE: 2.5; .5 SOLUTION RESPIRATORY (INHALATION) at 13:08

## 2018-01-01 RX ADMIN — Medication 10 ML: at 11:04

## 2018-01-01 RX ADMIN — Medication 1 G: at 06:32

## 2018-01-01 RX ADMIN — PANTOPRAZOLE SODIUM 40 MG: 40 INJECTION, POWDER, FOR SOLUTION INTRAVENOUS at 21:00

## 2018-01-01 RX ADMIN — PANTOPRAZOLE SODIUM 40 MG: 40 INJECTION, POWDER, FOR SOLUTION INTRAVENOUS at 10:51

## 2018-01-01 RX ADMIN — BUMETANIDE 1 MG: 0.25 INJECTION INTRAMUSCULAR; INTRAVENOUS at 09:10

## 2018-01-01 RX ADMIN — BUMETANIDE 1 MG: 0.25 INJECTION INTRAMUSCULAR; INTRAVENOUS at 14:13

## 2018-01-01 RX ADMIN — BUMETANIDE 1 MG: 0.25 INJECTION INTRAMUSCULAR; INTRAVENOUS at 22:34

## 2018-01-01 RX ADMIN — PANTOPRAZOLE SODIUM 40 MG: 40 TABLET, DELAYED RELEASE ORAL at 16:47

## 2018-01-01 RX ADMIN — MECLIZINE HYDROCHLORIDE 25 MG: 25 TABLET ORAL at 08:19

## 2018-01-01 RX ADMIN — SODIUM CHLORIDE: 9 INJECTION, SOLUTION INTRAVENOUS at 12:00

## 2018-01-01 RX ADMIN — METOPROLOL TARTRATE 2.5 MG: 5 INJECTION, SOLUTION INTRAVENOUS at 03:47

## 2018-01-01 RX ADMIN — GABAPENTIN 300 MG: 300 CAPSULE ORAL at 21:12

## 2018-01-01 RX ADMIN — IPRATROPIUM BROMIDE AND ALBUTEROL SULFATE 1 AMPULE: .5; 3 SOLUTION RESPIRATORY (INHALATION) at 14:01

## 2018-01-01 RX ADMIN — CILOSTAZOL 100 MG: 100 TABLET ORAL at 08:29

## 2018-01-01 RX ADMIN — BUMETANIDE 1 MG: 0.25 INJECTION INTRAMUSCULAR; INTRAVENOUS at 20:10

## 2018-01-01 RX ADMIN — Medication 1 G: at 09:13

## 2018-01-01 RX ADMIN — METOPROLOL TARTRATE 2.5 MG: 5 INJECTION, SOLUTION INTRAVENOUS at 09:11

## 2018-01-01 RX ADMIN — Medication 10 ML: at 03:47

## 2018-01-01 RX ADMIN — INSULIN LISPRO 2 UNITS: 100 INJECTION, SOLUTION INTRAVENOUS; SUBCUTANEOUS at 08:30

## 2018-01-01 RX ADMIN — INSULIN LISPRO 2 UNITS: 100 INJECTION, SOLUTION INTRAVENOUS; SUBCUTANEOUS at 12:45

## 2018-01-01 RX ADMIN — ACETAMINOPHEN 650 MG: 325 TABLET ORAL at 05:59

## 2018-01-01 RX ADMIN — CALCITRIOL 0.25 MCG: 0.25 CAPSULE ORAL at 09:11

## 2018-01-01 RX ADMIN — DOPAMINE HYDROCHLORIDE IN DEXTROSE 17 MCG/KG/MIN: 1.6 INJECTION, SOLUTION INTRAVENOUS at 16:54

## 2018-01-01 RX ADMIN — PANTOPRAZOLE SODIUM 40 MG: 40 INJECTION, POWDER, FOR SOLUTION INTRAVENOUS at 08:32

## 2018-01-01 RX ADMIN — CILOSTAZOL 50 MG: 50 TABLET ORAL at 20:24

## 2018-01-01 RX ADMIN — Medication 10 ML: at 11:36

## 2018-01-01 RX ADMIN — CILOSTAZOL 100 MG: 100 TABLET ORAL at 08:24

## 2018-01-01 RX ADMIN — GABAPENTIN 300 MG: 300 CAPSULE ORAL at 23:42

## 2018-01-01 RX ADMIN — HEPARIN SODIUM 3400 UNITS: 1000 INJECTION INTRAVENOUS; SUBCUTANEOUS at 16:13

## 2018-01-01 RX ADMIN — Medication 2 MG: at 23:38

## 2018-01-01 RX ADMIN — Medication 2 MG: at 02:28

## 2018-01-01 RX ADMIN — CILOSTAZOL 100 MG: 100 TABLET ORAL at 23:40

## 2018-01-01 RX ADMIN — CLOPIDOGREL BISULFATE 75 MG: 75 TABLET ORAL at 09:12

## 2018-01-01 RX ADMIN — DOPAMINE HYDROCHLORIDE IN DEXTROSE 15 MCG/KG/MIN: 1.6 INJECTION, SOLUTION INTRAVENOUS at 18:10

## 2018-01-01 RX ADMIN — HEPARIN SODIUM 5000 UNITS: 5000 INJECTION, SOLUTION INTRAVENOUS; SUBCUTANEOUS at 19:43

## 2018-01-01 RX ADMIN — CILOSTAZOL 100 MG: 50 TABLET ORAL at 20:55

## 2018-01-01 RX ADMIN — AMOXICILLIN 500 MG: 500 CAPSULE ORAL at 08:29

## 2018-01-01 RX ADMIN — CILOSTAZOL 100 MG: 100 TABLET ORAL at 21:12

## 2018-01-01 RX ADMIN — CLOPIDOGREL BISULFATE 75 MG: 75 TABLET ORAL at 09:11

## 2018-01-01 RX ADMIN — CLOPIDOGREL BISULFATE 75 MG: 75 TABLET ORAL at 11:04

## 2018-01-01 RX ADMIN — INSULIN GLARGINE 20 UNITS: 100 INJECTION, SOLUTION SUBCUTANEOUS at 21:14

## 2018-01-01 RX ADMIN — LABETALOL HYDROCHLORIDE 10 MG: 5 INJECTION, SOLUTION INTRAVENOUS at 13:38

## 2018-01-01 RX ADMIN — INSULIN LISPRO 4 UNITS: 100 INJECTION, SOLUTION INTRAVENOUS; SUBCUTANEOUS at 18:58

## 2018-01-01 RX ADMIN — AMLODIPINE BESYLATE 10 MG: 5 TABLET ORAL at 09:13

## 2018-01-01 RX ADMIN — IPRATROPIUM BROMIDE AND ALBUTEROL SULFATE 1 AMPULE: .5; 3 SOLUTION RESPIRATORY (INHALATION) at 07:16

## 2018-01-01 RX ADMIN — AMIODARONE HYDROCHLORIDE 0.5 MG/MIN: 50 INJECTION, SOLUTION INTRAVENOUS at 00:01

## 2018-01-01 RX ADMIN — CLOPIDOGREL BISULFATE 75 MG: 75 TABLET ORAL at 08:29

## 2018-01-01 RX ADMIN — HEPARIN SODIUM 5000 UNITS: 5000 INJECTION, SOLUTION INTRAVENOUS; SUBCUTANEOUS at 21:25

## 2018-01-01 RX ADMIN — HEPARIN SODIUM 5000 UNITS: 5000 INJECTION, SOLUTION INTRAVENOUS; SUBCUTANEOUS at 11:46

## 2018-01-01 RX ADMIN — CILOSTAZOL 100 MG: 100 TABLET ORAL at 20:10

## 2018-01-01 RX ADMIN — Medication 10 ML: at 10:52

## 2018-01-01 RX ADMIN — DOPAMINE HYDROCHLORIDE IN DEXTROSE 10 MCG/KG/MIN: 1.6 INJECTION, SOLUTION INTRAVENOUS at 12:40

## 2018-01-01 RX ADMIN — Medication 1 G: at 15:48

## 2018-01-01 RX ADMIN — CILOSTAZOL 100 MG: 100 TABLET ORAL at 08:06

## 2018-01-01 RX ADMIN — ACETYLCYSTEINE 600 MG: 200 SOLUTION ORAL; RESPIRATORY (INHALATION) at 10:14

## 2018-01-01 RX ADMIN — IPRATROPIUM BROMIDE AND ALBUTEROL SULFATE 1 AMPULE: .5; 3 SOLUTION RESPIRATORY (INHALATION) at 02:41

## 2018-01-01 RX ADMIN — Medication 150 MG: at 12:46

## 2018-01-01 RX ADMIN — LORAZEPAM 0.5 MG: 2 INJECTION INTRAMUSCULAR; INTRAVENOUS at 22:06

## 2018-01-01 RX ADMIN — CALCIUM GLUCONATE 2 G: 94 INJECTION, SOLUTION INTRAVENOUS at 11:18

## 2018-01-01 RX ADMIN — Medication 10 ML: at 08:00

## 2018-01-01 RX ADMIN — INSULIN LISPRO 2 UNITS: 100 INJECTION, SOLUTION INTRAVENOUS; SUBCUTANEOUS at 21:25

## 2018-01-01 RX ADMIN — ACETAMINOPHEN 650 MG: 325 TABLET, FILM COATED ORAL at 21:54

## 2018-01-01 RX ADMIN — ALLOPURINOL 100 MG: 100 TABLET ORAL at 09:11

## 2018-01-01 RX ADMIN — INSULIN LISPRO 4 UNITS: 100 INJECTION, SOLUTION INTRAVENOUS; SUBCUTANEOUS at 16:57

## 2018-01-01 RX ADMIN — Medication 1 G: at 17:24

## 2018-01-01 RX ADMIN — CLOPIDOGREL BISULFATE 75 MG: 75 TABLET ORAL at 09:44

## 2018-01-01 RX ADMIN — Medication 2 MG: at 20:55

## 2018-01-01 RX ADMIN — Medication 2 MG: at 10:47

## 2018-01-01 RX ADMIN — HYDROCODONE BITARTRATE AND ACETAMINOPHEN 1 TABLET: 7.5; 325 TABLET ORAL at 21:11

## 2018-01-01 RX ADMIN — Medication 2 MG: at 13:24

## 2018-01-01 RX ADMIN — AMIODARONE HYDROCHLORIDE 150 MG: 50 INJECTION, SOLUTION INTRAVENOUS at 11:59

## 2018-01-01 RX ADMIN — CARVEDILOL 12.5 MG: 12.5 TABLET, FILM COATED ORAL at 09:13

## 2018-01-01 RX ADMIN — FERROUS SULFATE TAB 325 MG (65 MG ELEMENTAL FE) 325 MG: 325 (65 FE) TAB at 09:13

## 2018-01-01 RX ADMIN — HEPARIN SODIUM 5000 UNITS: 5000 INJECTION, SOLUTION INTRAVENOUS; SUBCUTANEOUS at 21:54

## 2018-01-01 RX ADMIN — PANTOPRAZOLE SODIUM 40 MG: 40 INJECTION, POWDER, FOR SOLUTION INTRAVENOUS at 10:48

## 2018-01-01 RX ADMIN — ERGOCALCIFEROL 50000 UNITS: 1.25 CAPSULE ORAL at 11:35

## 2018-01-01 RX ADMIN — Medication 2 MG: at 20:37

## 2018-01-01 RX ADMIN — HEPARIN SODIUM 5000 UNITS: 5000 INJECTION, SOLUTION INTRAVENOUS; SUBCUTANEOUS at 20:23

## 2018-01-01 RX ADMIN — ALLOPURINOL 100 MG: 100 TABLET ORAL at 08:31

## 2018-01-01 RX ADMIN — METOPROLOL TARTRATE 2.5 MG: 5 INJECTION, SOLUTION INTRAVENOUS at 04:42

## 2018-01-01 RX ADMIN — Medication 10 ML: at 08:50

## 2018-01-01 RX ADMIN — METOPROLOL TARTRATE 2.5 MG: 5 INJECTION, SOLUTION INTRAVENOUS at 15:50

## 2018-01-01 RX ADMIN — METOPROLOL TARTRATE 2.5 MG: 5 INJECTION, SOLUTION INTRAVENOUS at 19:44

## 2018-01-01 RX ADMIN — ALLOPURINOL 100 MG: 100 TABLET ORAL at 08:05

## 2018-01-01 RX ADMIN — INSULIN LISPRO 4 UNITS: 100 INJECTION, SOLUTION INTRAVENOUS; SUBCUTANEOUS at 12:07

## 2018-01-01 RX ADMIN — BUMETANIDE 2 MG: 1 TABLET ORAL at 11:21

## 2018-01-01 RX ADMIN — CILOSTAZOL 100 MG: 100 TABLET ORAL at 21:42

## 2018-01-01 RX ADMIN — EPINEPHRINE 0.5 MG: 0.1 INJECTION, SOLUTION ENDOTRACHEAL; INTRACARDIAC; INTRAVENOUS at 12:56

## 2018-01-01 RX ADMIN — EPINEPHRINE 1 MG: 0.1 INJECTION INTRACARDIAC; INTRAVENOUS at 12:00

## 2018-01-01 RX ADMIN — PANTOPRAZOLE SODIUM 40 MG: 40 TABLET, DELAYED RELEASE ORAL at 15:04

## 2018-01-01 RX ADMIN — HYDROCODONE BITARTRATE AND ACETAMINOPHEN 1 TABLET: 7.5; 325 TABLET ORAL at 11:44

## 2018-01-01 RX ADMIN — PANTOPRAZOLE SODIUM 40 MG: 40 INJECTION, POWDER, FOR SOLUTION INTRAVENOUS at 14:28

## 2018-01-01 RX ADMIN — DOPAMINE HYDROCHLORIDE IN DEXTROSE 7.5 MCG/KG/MIN: 1.6 INJECTION, SOLUTION INTRAVENOUS at 04:32

## 2018-01-01 RX ADMIN — GABAPENTIN 100 MG: 100 CAPSULE ORAL at 12:03

## 2018-01-01 RX ADMIN — HEPARIN SODIUM 5000 UNITS: 5000 INJECTION, SOLUTION INTRAVENOUS; SUBCUTANEOUS at 01:25

## 2018-01-01 RX ADMIN — Medication 10 ML: at 23:38

## 2018-01-01 RX ADMIN — Medication 2 MG: at 16:40

## 2018-01-01 RX ADMIN — Medication 10 ML: at 08:03

## 2018-01-01 RX ADMIN — IPRATROPIUM BROMIDE AND ALBUTEROL SULFATE 1 AMPULE: .5; 3 SOLUTION RESPIRATORY (INHALATION) at 18:31

## 2018-01-01 RX ADMIN — CARVEDILOL 12.5 MG: 12.5 TABLET, FILM COATED ORAL at 17:04

## 2018-01-01 RX ADMIN — CILOSTAZOL 50 MG: 50 TABLET ORAL at 08:02

## 2018-01-01 RX ADMIN — EPINEPHRINE 1 MG: 0.1 INJECTION INTRACARDIAC; INTRAVENOUS at 11:39

## 2018-01-01 RX ADMIN — INSULIN LISPRO 4 UNITS: 100 INJECTION, SOLUTION INTRAVENOUS; SUBCUTANEOUS at 11:53

## 2018-01-01 RX ADMIN — INSULIN GLARGINE 15 UNITS: 100 INJECTION, SOLUTION SUBCUTANEOUS at 21:25

## 2018-01-01 RX ADMIN — LORAZEPAM 0.5 MG: 2 INJECTION INTRAMUSCULAR; INTRAVENOUS at 02:13

## 2018-01-01 RX ADMIN — ACETYLCYSTEINE 600 MG: 200 SOLUTION ORAL; RESPIRATORY (INHALATION) at 22:48

## 2018-01-01 RX ADMIN — PANTOPRAZOLE SODIUM 40 MG: 40 TABLET, DELAYED RELEASE ORAL at 15:48

## 2018-01-01 RX ADMIN — IPRATROPIUM BROMIDE AND ALBUTEROL SULFATE 1 AMPULE: .5; 3 SOLUTION RESPIRATORY (INHALATION) at 01:57

## 2018-01-01 RX ADMIN — HYDROCODONE BITARTRATE AND ACETAMINOPHEN 1 TABLET: 7.5; 325 TABLET ORAL at 22:15

## 2018-01-01 RX ADMIN — ACETAMINOPHEN 650 MG: 325 TABLET, FILM COATED ORAL at 17:27

## 2018-01-01 RX ADMIN — SODIUM CHLORIDE: 9 INJECTION, SOLUTION INTRAVENOUS at 14:37

## 2018-01-01 RX ADMIN — Medication 10 ML: at 11:37

## 2018-01-01 RX ADMIN — CILOSTAZOL 100 MG: 50 TABLET ORAL at 08:21

## 2018-01-01 RX ADMIN — HYDROCODONE BITARTRATE AND ACETAMINOPHEN 1 TABLET: 7.5; 325 TABLET ORAL at 11:56

## 2018-01-01 RX ADMIN — HYDROCODONE BITARTRATE AND ACETAMINOPHEN 1 TABLET: 7.5; 325 TABLET ORAL at 19:41

## 2018-01-01 RX ADMIN — Medication 2 MG: at 23:30

## 2018-01-01 RX ADMIN — FERROUS SULFATE TAB 325 MG (65 MG ELEMENTAL FE) 325 MG: 325 (65 FE) TAB at 17:38

## 2018-01-01 RX ADMIN — PANTOPRAZOLE SODIUM 40 MG: 40 TABLET, DELAYED RELEASE ORAL at 17:06

## 2018-01-01 RX ADMIN — MORPHINE SULFATE 1 MG: 2 INJECTION, SOLUTION INTRAMUSCULAR; INTRAVENOUS at 12:13

## 2018-01-01 RX ADMIN — CLOPIDOGREL BISULFATE 75 MG: 75 TABLET ORAL at 08:31

## 2018-01-01 RX ADMIN — Medication 2 MG: at 11:44

## 2018-01-01 RX ADMIN — PANTOPRAZOLE SODIUM 40 MG: 40 TABLET, DELAYED RELEASE ORAL at 09:46

## 2018-01-01 RX ADMIN — METOPROLOL TARTRATE 2.5 MG: 5 INJECTION, SOLUTION INTRAVENOUS at 16:03

## 2018-01-01 RX ADMIN — NEPHROCAP 1 MG: 1 CAP ORAL at 17:15

## 2018-01-01 RX ADMIN — Medication 2 MG: at 12:40

## 2018-01-01 RX ADMIN — NEPHROCAP 1 MG: 1 CAP ORAL at 08:31

## 2018-01-01 RX ADMIN — METOLAZONE 2.5 MG: 2.5 TABLET ORAL at 23:41

## 2018-01-01 RX ADMIN — ONDANSETRON HYDROCHLORIDE 4 MG: 2 INJECTION, SOLUTION INTRAMUSCULAR; INTRAVENOUS at 10:36

## 2018-01-01 RX ADMIN — Medication 2 MG: at 05:54

## 2018-01-01 RX ADMIN — CILOSTAZOL 100 MG: 100 TABLET ORAL at 09:02

## 2018-01-01 RX ADMIN — CARVEDILOL 12.5 MG: 12.5 TABLET, FILM COATED ORAL at 16:20

## 2018-01-01 RX ADMIN — CILOSTAZOL 100 MG: 100 TABLET ORAL at 20:04

## 2018-01-01 RX ADMIN — HEPARIN SODIUM 5000 UNITS: 5000 INJECTION, SOLUTION INTRAVENOUS; SUBCUTANEOUS at 20:18

## 2018-01-01 RX ADMIN — CARVEDILOL 6.25 MG: 6.25 TABLET, FILM COATED ORAL at 08:21

## 2018-01-01 RX ADMIN — ACETYLCYSTEINE 600 MG: 200 SOLUTION ORAL; RESPIRATORY (INHALATION) at 18:31

## 2018-01-01 RX ADMIN — AMOXICILLIN 500 MG: 500 CAPSULE ORAL at 20:17

## 2018-01-01 RX ADMIN — PANTOPRAZOLE SODIUM 40 MG: 40 TABLET, DELAYED RELEASE ORAL at 06:00

## 2018-01-01 RX ADMIN — ERGOCALCIFEROL 50000 UNITS: 1.25 CAPSULE ORAL at 09:12

## 2018-01-01 RX ADMIN — HEPARIN SODIUM 5000 UNITS: 5000 INJECTION, SOLUTION INTRAVENOUS; SUBCUTANEOUS at 09:11

## 2018-01-01 RX ADMIN — LORAZEPAM 0.5 MG: 2 INJECTION INTRAMUSCULAR; INTRAVENOUS at 23:42

## 2018-01-01 RX ADMIN — DOPAMINE HYDROCHLORIDE IN DEXTROSE 8 MCG/KG/MIN: 1.6 INJECTION, SOLUTION INTRAVENOUS at 04:07

## 2018-01-01 RX ADMIN — HYDROCODONE BITARTRATE AND ACETAMINOPHEN 1 TABLET: 7.5; 325 TABLET ORAL at 18:20

## 2018-01-01 RX ADMIN — CILOSTAZOL 100 MG: 50 TABLET ORAL at 22:04

## 2018-01-01 RX ADMIN — METOPROLOL TARTRATE 2.5 MG: 5 INJECTION, SOLUTION INTRAVENOUS at 03:01

## 2018-01-01 RX ADMIN — CEPHALEXIN 250 MG: 250 CAPSULE ORAL at 20:55

## 2018-01-01 RX ADMIN — FERROUS SULFATE TAB 325 MG (65 MG ELEMENTAL FE) 325 MG: 325 (65 FE) TAB at 17:04

## 2018-01-01 RX ADMIN — CILOSTAZOL 100 MG: 100 TABLET ORAL at 22:08

## 2018-01-01 RX ADMIN — METOPROLOL TARTRATE 2.5 MG: 5 INJECTION, SOLUTION INTRAVENOUS at 21:00

## 2018-01-01 RX ADMIN — Medication 10 ML: at 22:13

## 2018-01-01 RX ADMIN — CILOSTAZOL 100 MG: 50 TABLET ORAL at 19:26

## 2018-01-01 RX ADMIN — LORAZEPAM 0.5 MG: 2 INJECTION INTRAMUSCULAR; INTRAVENOUS at 04:34

## 2018-01-01 RX ADMIN — CARVEDILOL 12.5 MG: 12.5 TABLET, FILM COATED ORAL at 08:31

## 2018-01-01 RX ADMIN — INSULIN LISPRO 8 UNITS: 100 INJECTION, SOLUTION INTRAVENOUS; SUBCUTANEOUS at 18:06

## 2018-01-01 RX ADMIN — ALLOPURINOL 100 MG: 100 TABLET ORAL at 08:02

## 2018-01-01 RX ADMIN — INSULIN GLARGINE 18 UNITS: 100 INJECTION, SOLUTION SUBCUTANEOUS at 22:04

## 2018-01-01 RX ADMIN — CLOPIDOGREL BISULFATE 75 MG: 75 TABLET ORAL at 11:41

## 2018-01-01 RX ADMIN — HEPARIN SODIUM 5000 UNITS: 5000 INJECTION, SOLUTION INTRAVENOUS; SUBCUTANEOUS at 20:39

## 2018-01-01 RX ADMIN — Medication 2 MG: at 06:21

## 2018-01-01 RX ADMIN — METOPROLOL TARTRATE 2.5 MG: 5 INJECTION, SOLUTION INTRAVENOUS at 22:37

## 2018-01-01 RX ADMIN — Medication 2 MG: at 13:28

## 2018-01-01 RX ADMIN — EPINEPHRINE 4 MCG/MIN: 1 INJECTION, SOLUTION, CONCENTRATE INTRAVENOUS at 14:35

## 2018-01-01 RX ADMIN — HEPARIN SODIUM 5000 UNITS: 5000 INJECTION, SOLUTION INTRAVENOUS; SUBCUTANEOUS at 09:12

## 2018-01-01 RX ADMIN — EPINEPHRINE 1 MCG/MIN: 1 INJECTION, SOLUTION, CONCENTRATE INTRAVENOUS at 13:23

## 2018-01-01 RX ADMIN — IPRATROPIUM BROMIDE AND ALBUTEROL SULFATE 1 AMPULE: .5; 3 SOLUTION RESPIRATORY (INHALATION) at 06:29

## 2018-01-01 RX ADMIN — PANTOPRAZOLE SODIUM 40 MG: 40 INJECTION, POWDER, FOR SOLUTION INTRAVENOUS at 20:10

## 2018-01-01 RX ADMIN — PANTOPRAZOLE SODIUM 40 MG: 40 INJECTION, POWDER, FOR SOLUTION INTRAVENOUS at 21:52

## 2018-01-01 RX ADMIN — INSULIN LISPRO 4 UNITS: 100 INJECTION, SOLUTION INTRAVENOUS; SUBCUTANEOUS at 17:39

## 2018-01-01 RX ADMIN — GABAPENTIN 100 MG: 100 CAPSULE ORAL at 08:29

## 2018-01-01 RX ADMIN — Medication 10 ML: at 20:39

## 2018-01-01 RX ADMIN — DEXTROSE MONOHYDRATE: 50 INJECTION, SOLUTION INTRAVENOUS at 22:05

## 2018-01-01 RX ADMIN — GABAPENTIN 300 MG: 300 CAPSULE ORAL at 20:17

## 2018-01-01 RX ADMIN — NEPHROCAP 1 MG: 1 CAP ORAL at 11:35

## 2018-01-01 RX ADMIN — BUMETANIDE 1 MG: 0.25 INJECTION INTRAMUSCULAR; INTRAVENOUS at 17:06

## 2018-01-01 RX ADMIN — AMOXICILLIN 500 MG: 500 CAPSULE ORAL at 21:09

## 2018-01-01 RX ADMIN — GABAPENTIN 100 MG: 100 CAPSULE ORAL at 11:35

## 2018-01-01 RX ADMIN — PANTOPRAZOLE SODIUM 40 MG: 40 INJECTION, POWDER, FOR SOLUTION INTRAVENOUS at 09:02

## 2018-01-01 RX ADMIN — MECLIZINE HYDROCHLORIDE 25 MG: 25 TABLET ORAL at 08:54

## 2018-01-01 RX ADMIN — MECLIZINE HYDROCHLORIDE 25 MG: 25 TABLET ORAL at 08:43

## 2018-01-01 RX ADMIN — METOPROLOL TARTRATE 2.5 MG: 5 INJECTION, SOLUTION INTRAVENOUS at 20:39

## 2018-01-01 RX ADMIN — MIDAZOLAM HYDROCHLORIDE 0.5 MG: 1 INJECTION, SOLUTION INTRAMUSCULAR; INTRAVENOUS at 09:07

## 2018-01-01 RX ADMIN — Medication 10 ML: at 19:37

## 2018-01-01 RX ADMIN — Medication 10 ML: at 23:07

## 2018-01-01 RX ADMIN — ATENOLOL 50 MG: 50 TABLET ORAL at 12:02

## 2018-01-01 RX ADMIN — LORAZEPAM 0.5 MG: 2 INJECTION INTRAMUSCULAR; INTRAVENOUS at 20:23

## 2018-01-01 RX ADMIN — AMOXICILLIN 500 MG: 500 CAPSULE ORAL at 08:31

## 2018-01-01 RX ADMIN — Medication 10 ML: at 22:33

## 2018-01-01 RX ADMIN — IPRATROPIUM BROMIDE AND ALBUTEROL SULFATE 1 AMPULE: .5; 3 SOLUTION RESPIRATORY (INHALATION) at 18:11

## 2018-01-01 RX ADMIN — LORAZEPAM 0.5 MG: 2 INJECTION INTRAMUSCULAR; INTRAVENOUS at 07:16

## 2018-01-01 RX ADMIN — IPRATROPIUM BROMIDE AND ALBUTEROL SULFATE 1 AMPULE: .5; 3 SOLUTION RESPIRATORY (INHALATION) at 10:03

## 2018-01-01 RX ADMIN — ENOXAPARIN SODIUM 30 MG: 30 INJECTION SUBCUTANEOUS at 18:20

## 2018-01-01 RX ADMIN — POTASSIUM CHLORIDE 20 MEQ: 20 SOLUTION ORAL at 08:01

## 2018-01-01 RX ADMIN — PANTOPRAZOLE SODIUM 40 MG: 40 TABLET, DELAYED RELEASE ORAL at 16:19

## 2018-01-01 RX ADMIN — Medication 2 MG: at 16:05

## 2018-01-01 RX ADMIN — BUMETANIDE 1 MG: 0.25 INJECTION INTRAMUSCULAR; INTRAVENOUS at 22:58

## 2018-01-01 RX ADMIN — INSULIN LISPRO 6 UNITS: 100 INJECTION, SOLUTION INTRAVENOUS; SUBCUTANEOUS at 16:56

## 2018-01-01 RX ADMIN — CARVEDILOL 12.5 MG: 12.5 TABLET, FILM COATED ORAL at 16:55

## 2018-01-01 RX ADMIN — FERROUS SULFATE TAB 325 MG (65 MG ELEMENTAL FE) 325 MG: 325 (65 FE) TAB at 09:30

## 2018-01-01 RX ADMIN — AMOXICILLIN 500 MG: 500 CAPSULE ORAL at 09:10

## 2018-01-01 RX ADMIN — PANTOPRAZOLE SODIUM 40 MG: 40 TABLET, DELAYED RELEASE ORAL at 06:03

## 2018-01-01 RX ADMIN — PANTOPRAZOLE SODIUM 40 MG: 40 TABLET, DELAYED RELEASE ORAL at 15:52

## 2018-01-01 RX ADMIN — ENOXAPARIN SODIUM 30 MG: 30 INJECTION SUBCUTANEOUS at 18:13

## 2018-01-01 RX ADMIN — INSULIN LISPRO 4 UNITS: 100 INJECTION, SOLUTION INTRAVENOUS; SUBCUTANEOUS at 12:15

## 2018-01-01 RX ADMIN — BUMETANIDE 1 MG: 1 TABLET ORAL at 09:43

## 2018-01-01 RX ADMIN — IPRATROPIUM BROMIDE AND ALBUTEROL SULFATE 1 AMPULE: .5; 3 SOLUTION RESPIRATORY (INHALATION) at 14:21

## 2018-01-01 RX ADMIN — INSULIN LISPRO 6 UNITS: 100 INJECTION, SOLUTION INTRAVENOUS; SUBCUTANEOUS at 17:23

## 2018-01-01 RX ADMIN — ACETYLCYSTEINE 600 MG: 200 SOLUTION ORAL; RESPIRATORY (INHALATION) at 06:38

## 2018-01-01 RX ADMIN — Medication 10 ML: at 15:47

## 2018-01-01 RX ADMIN — PANTOPRAZOLE SODIUM 40 MG: 40 TABLET, DELAYED RELEASE ORAL at 06:05

## 2018-01-01 RX ADMIN — IPRATROPIUM BROMIDE AND ALBUTEROL SULFATE 1 AMPULE: .5; 3 SOLUTION RESPIRATORY (INHALATION) at 18:20

## 2018-01-01 RX ADMIN — INSULIN LISPRO 4 UNITS: 100 INJECTION, SOLUTION INTRAVENOUS; SUBCUTANEOUS at 18:02

## 2018-01-01 RX ADMIN — METOPROLOL TARTRATE 2.5 MG: 5 INJECTION, SOLUTION INTRAVENOUS at 09:02

## 2018-01-01 RX ADMIN — METOPROLOL TARTRATE 2.5 MG: 5 INJECTION, SOLUTION INTRAVENOUS at 23:08

## 2018-01-01 RX ADMIN — MORPHINE SULFATE: 2 INJECTION, SOLUTION INTRAMUSCULAR; INTRAVENOUS at 16:19

## 2018-01-01 RX ADMIN — IPRATROPIUM BROMIDE AND ALBUTEROL SULFATE 1 AMPULE: .5; 3 SOLUTION RESPIRATORY (INHALATION) at 06:37

## 2018-01-01 RX ADMIN — GABAPENTIN 300 MG: 300 CAPSULE ORAL at 21:11

## 2018-01-01 RX ADMIN — ACETAMINOPHEN 650 MG: 325 TABLET, FILM COATED ORAL at 11:21

## 2018-01-01 RX ADMIN — ALLOPURINOL 100 MG: 100 TABLET ORAL at 09:10

## 2018-01-01 RX ADMIN — Medication 10 ML: at 20:14

## 2018-01-01 RX ADMIN — IPRATROPIUM BROMIDE AND ALBUTEROL SULFATE 1 AMPULE: .5; 3 SOLUTION RESPIRATORY (INHALATION) at 22:19

## 2018-01-01 RX ADMIN — CALCIUM CHLORIDE 1 G: 100 INJECTION, SOLUTION INTRAVENOUS; INTRAVENTRICULAR at 12:01

## 2018-01-01 RX ADMIN — IPRATROPIUM BROMIDE AND ALBUTEROL SULFATE 1 AMPULE: .5; 3 SOLUTION RESPIRATORY (INHALATION) at 19:07

## 2018-01-01 RX ADMIN — Medication 2 MG: at 18:20

## 2018-01-01 RX ADMIN — ONDANSETRON HYDROCHLORIDE 4 MG: 2 INJECTION, SOLUTION INTRAMUSCULAR; INTRAVENOUS at 08:32

## 2018-01-01 RX ADMIN — ACETAMINOPHEN 650 MG: 325 TABLET, FILM COATED ORAL at 17:04

## 2018-01-01 RX ADMIN — SODIUM CHLORIDE: 9 INJECTION, SOLUTION INTRAVENOUS at 13:18

## 2018-01-01 RX ADMIN — ALLOPURINOL 100 MG: 100 TABLET ORAL at 08:20

## 2018-01-01 RX ADMIN — AMIODARONE HYDROCHLORIDE 0.5 MG/MIN: 50 INJECTION, SOLUTION INTRAVENOUS at 15:16

## 2018-01-01 RX ADMIN — AMIODARONE HYDROCHLORIDE 150 MG: 50 INJECTION, SOLUTION INTRAVENOUS at 11:39

## 2018-01-01 RX ADMIN — CILOSTAZOL 100 MG: 100 TABLET ORAL at 20:58

## 2018-01-01 RX ADMIN — CEFTRIAXONE 1 G: 1 INJECTION, POWDER, FOR SOLUTION INTRAMUSCULAR; INTRAVENOUS at 14:01

## 2018-01-01 RX ADMIN — Medication 2 MG: at 20:22

## 2018-01-01 RX ADMIN — CALCITRIOL 0.25 MCG: 0.25 CAPSULE ORAL at 08:24

## 2018-01-01 ASSESSMENT — PAIN SCALES - GENERAL
PAINLEVEL_OUTOF10: 9
PAINLEVEL_OUTOF10: 10
PAINLEVEL_OUTOF10: 5
PAINLEVEL_OUTOF10: 7
PAINLEVEL_OUTOF10: 2
PAINLEVEL_OUTOF10: 0
PAINLEVEL_OUTOF10: 0
PAINLEVEL_OUTOF10: 3
PAINLEVEL_OUTOF10: 0
PAINLEVEL_OUTOF10: 7
PAINLEVEL_OUTOF10: 6
PAINLEVEL_OUTOF10: 0
PAINLEVEL_OUTOF10: 3
PAINLEVEL_OUTOF10: 0
PAINLEVEL_OUTOF10: 5
PAINLEVEL_OUTOF10: 7
PAINLEVEL_OUTOF10: 7
PAINLEVEL_OUTOF10: 0
PAINLEVEL_OUTOF10: 5
PAINLEVEL_OUTOF10: 6
PAINLEVEL_OUTOF10: 0
PAINLEVEL_OUTOF10: 6
PAINLEVEL_OUTOF10: 6
PAINLEVEL_OUTOF10: 8
PAINLEVEL_OUTOF10: 10
PAINLEVEL_OUTOF10: 8
PAINLEVEL_OUTOF10: 0
PAINLEVEL_OUTOF10: 5
PAINLEVEL_OUTOF10: 3
PAINLEVEL_OUTOF10: 0
PAINLEVEL_OUTOF10: 6
PAINLEVEL_OUTOF10: 6
PAINLEVEL_OUTOF10: 7
PAINLEVEL_OUTOF10: 0
PAINLEVEL_OUTOF10: 0
PAINLEVEL_OUTOF10: 3
PAINLEVEL_OUTOF10: 2
PAINLEVEL_OUTOF10: 7
PAINLEVEL_OUTOF10: 0
PAINLEVEL_OUTOF10: 7
PAINLEVEL_OUTOF10: 0
PAINLEVEL_OUTOF10: 7
PAINLEVEL_OUTOF10: 0
PAINLEVEL_OUTOF10: 7
PAINLEVEL_OUTOF10: 8
PAINLEVEL_OUTOF10: 0
PAINLEVEL_OUTOF10: 10
PAINLEVEL_OUTOF10: 4
PAINLEVEL_OUTOF10: 7
PAINLEVEL_OUTOF10: 3
PAINLEVEL_OUTOF10: 0
PAINLEVEL_OUTOF10: 8
PAINLEVEL_OUTOF10: 0
PAINLEVEL_OUTOF10: 4
PAINLEVEL_OUTOF10: 5
PAINLEVEL_OUTOF10: 7
PAINLEVEL_OUTOF10: 7
PAINLEVEL_OUTOF10: 0
PAINLEVEL_OUTOF10: 8
PAINLEVEL_OUTOF10: 9
PAINLEVEL_OUTOF10: 0
PAINLEVEL_OUTOF10: 4
PAINLEVEL_OUTOF10: 2
PAINLEVEL_OUTOF10: 4
PAINLEVEL_OUTOF10: 8
PAINLEVEL_OUTOF10: 0
PAINLEVEL_OUTOF10: 6
PAINLEVEL_OUTOF10: 7
PAINLEVEL_OUTOF10: 7
PAINLEVEL_OUTOF10: 0
PAINLEVEL_OUTOF10: 7
PAINLEVEL_OUTOF10: 5
PAINLEVEL_OUTOF10: 4
PAINLEVEL_OUTOF10: 9
PAINLEVEL_OUTOF10: 0
PAINLEVEL_OUTOF10: 0
PAINLEVEL_OUTOF10: 5
PAINLEVEL_OUTOF10: 7
PAINLEVEL_OUTOF10: 8
PAINLEVEL_OUTOF10: 5
PAINLEVEL_OUTOF10: 4
PAINLEVEL_OUTOF10: 0
PAINLEVEL_OUTOF10: 5
PAINLEVEL_OUTOF10: 0
PAINLEVEL_OUTOF10: 2
PAINLEVEL_OUTOF10: 7
PAINLEVEL_OUTOF10: 10
PAINLEVEL_OUTOF10: 0
PAINLEVEL_OUTOF10: 6
PAINLEVEL_OUTOF10: 0
PAINLEVEL_OUTOF10: 7
PAINLEVEL_OUTOF10: 7
PAINLEVEL_OUTOF10: 10
PAINLEVEL_OUTOF10: 7
PAINLEVEL_OUTOF10: 0
PAINLEVEL_OUTOF10: 6
PAINLEVEL_OUTOF10: 0
PAINLEVEL_OUTOF10: 7
PAINLEVEL_OUTOF10: 0
PAINLEVEL_OUTOF10: 6
PAINLEVEL_OUTOF10: 0
PAINLEVEL_OUTOF10: 8
PAINLEVEL_OUTOF10: 0
PAINLEVEL_OUTOF10: 2
PAINLEVEL_OUTOF10: 4
PAINLEVEL_OUTOF10: 8
PAINLEVEL_OUTOF10: 0
PAINLEVEL_OUTOF10: 6
PAINLEVEL_OUTOF10: 0
PAINLEVEL_OUTOF10: 4
PAINLEVEL_OUTOF10: 7
PAINLEVEL_OUTOF10: 0
PAINLEVEL_OUTOF10: 2
PAINLEVEL_OUTOF10: 10
PAINLEVEL_OUTOF10: 0
PAINLEVEL_OUTOF10: 0
PAINLEVEL_OUTOF10: 2
PAINLEVEL_OUTOF10: 0
PAINLEVEL_OUTOF10: 9
PAINLEVEL_OUTOF10: 4
PAINLEVEL_OUTOF10: 5
PAINLEVEL_OUTOF10: 10
PAINLEVEL_OUTOF10: 4
PAINLEVEL_OUTOF10: 7
PAINLEVEL_OUTOF10: 0
PAINLEVEL_OUTOF10: 7
PAINLEVEL_OUTOF10: 8
PAINLEVEL_OUTOF10: 0
PAINLEVEL_OUTOF10: 0
PAINLEVEL_OUTOF10: 2
PAINLEVEL_OUTOF10: 0
PAINLEVEL_OUTOF10: 5
PAINLEVEL_OUTOF10: 8
PAINLEVEL_OUTOF10: 0
PAINLEVEL_OUTOF10: 5
PAINLEVEL_OUTOF10: 8
PAINLEVEL_OUTOF10: 2
PAINLEVEL_OUTOF10: 0
PAINLEVEL_OUTOF10: 0
PAINLEVEL_OUTOF10: 6
PAINLEVEL_OUTOF10: 0
PAINLEVEL_OUTOF10: 0
PAINLEVEL_OUTOF10: 7
PAINLEVEL_OUTOF10: 0
PAINLEVEL_OUTOF10: 4
PAINLEVEL_OUTOF10: 7
PAINLEVEL_OUTOF10: 0
PAINLEVEL_OUTOF10: 4
PAINLEVEL_OUTOF10: 6
PAINLEVEL_OUTOF10: 0
PAINLEVEL_OUTOF10: 2
PAINLEVEL_OUTOF10: 0
PAINLEVEL_OUTOF10: 6
PAINLEVEL_OUTOF10: 5
PAINLEVEL_OUTOF10: 3
PAINLEVEL_OUTOF10: 6
PAINLEVEL_OUTOF10: 4
PAINLEVEL_OUTOF10: 6
PAINLEVEL_OUTOF10: 7
PAINLEVEL_OUTOF10: 0
PAINLEVEL_OUTOF10: 0
PAINLEVEL_OUTOF10: 8
PAINLEVEL_OUTOF10: 0
PAINLEVEL_OUTOF10: 5
PAINLEVEL_OUTOF10: 0
PAINLEVEL_OUTOF10: 7
PAINLEVEL_OUTOF10: 4
PAINLEVEL_OUTOF10: 7

## 2018-01-01 ASSESSMENT — PULMONARY FUNCTION TESTS
PIF_VALUE: 30.1
PIF_VALUE: 28.2
PIF_VALUE: 26.4
PIF_VALUE: 28.9
PIF_VALUE: 8.3
PIF_VALUE: 32.6
PIF_VALUE: 11.1
PIF_VALUE: 27.2
PIF_VALUE: 11.6
PIF_VALUE: 37
PIF_VALUE: 41.1
PIF_VALUE: 27.7
PIF_VALUE: 29.3
PIF_VALUE: 26.4
PIF_VALUE: 32.9
PIF_VALUE: 26
PIF_VALUE: 27.9
PIF_VALUE: 27.3
PIF_VALUE: 24.9
PIF_VALUE: 30.3
PIF_VALUE: 27.5
PIF_VALUE: 28.3
PIF_VALUE: 26.4
PIF_VALUE: 26.4
PIF_VALUE: 33.6
PIF_VALUE: 8.4
PIF_VALUE: 7.8
PIF_VALUE: 29.5
PIF_VALUE: 26.9
PIF_VALUE: 30.7
PIF_VALUE: 27.5
PIF_VALUE: 26.8
PIF_VALUE: 28.1
PIF_VALUE: 30.5
PIF_VALUE: 26.7
PIF_VALUE: 26.5
PIF_VALUE: 22.3
PIF_VALUE: 24.7
PIF_VALUE: 28.4
PIF_VALUE: 18.6
PIF_VALUE: 27.7
PIF_VALUE: 16.9
PIF_VALUE: 19.5
PIF_VALUE: 27.7
PIF_VALUE: 25.9
PIF_VALUE: 8.4
PIF_VALUE: 19.5
PIF_VALUE: 27.4
PIF_VALUE: 27.7
PIF_VALUE: 31.1
PIF_VALUE: 28
PIF_VALUE: 8.3
PIF_VALUE: 11.6
PIF_VALUE: 28.7
PIF_VALUE: 24.8
PIF_VALUE: 28.1
PIF_VALUE: 21.4
PIF_VALUE: 35.1
PIF_VALUE: 8.2
PIF_VALUE: 26.9
PIF_VALUE: 26.3
PIF_VALUE: 27.2
PIF_VALUE: 29
PIF_VALUE: 25.5
PIF_VALUE: 26.1
PIF_VALUE: 30.4
PIF_VALUE: 26.7
PIF_VALUE: 29
PIF_VALUE: 17.8
PIF_VALUE: 41.7
PIF_VALUE: 26.9
PIF_VALUE: 25.6
PIF_VALUE: 28.8
PIF_VALUE: 14.3
PIF_VALUE: 26.6
PIF_VALUE: 30.3
PIF_VALUE: 18.4
PIF_VALUE: 26.1
PIF_VALUE: 33
PIF_VALUE: 31.4
PIF_VALUE: 25.4
PIF_VALUE: 29
PIF_VALUE: 30.9
PIF_VALUE: 16
PIF_VALUE: 23.1
PIF_VALUE: 26.5
PIF_VALUE: 28.9
PIF_VALUE: 29
PIF_VALUE: 8.4
PIF_VALUE: 7.7
PIF_VALUE: 27.9
PIF_VALUE: 30.8
PIF_VALUE: 26.5
PIF_VALUE: 29
PIF_VALUE: 29.7
PIF_VALUE: 28.8
PIF_VALUE: 32.2
PIF_VALUE: 29.8
PIF_VALUE: 11
PIF_VALUE: 28.3
PIF_VALUE: 28.2
PIF_VALUE: 27.7
PIF_VALUE: 25.8
PIF_VALUE: 30.4
PIF_VALUE: 27.9
PIF_VALUE: 26.9
PIF_VALUE: 30.6
PIF_VALUE: 28.8
PIF_VALUE: 29.5
PIF_VALUE: 19.8
PIF_VALUE: 28.2
PIF_VALUE: 28
PIF_VALUE: 49
PIF_VALUE: 31.9
PIF_VALUE: 18.5
PIF_VALUE: 28.5
PIF_VALUE: 48.5
PIF_VALUE: 21
PIF_VALUE: 19.2
PIF_VALUE: 26.1
PIF_VALUE: 30.2
PIF_VALUE: 20
PIF_VALUE: 28.3
PIF_VALUE: 34.6
PIF_VALUE: 39
PIF_VALUE: 28.8
PIF_VALUE: 8.1
PIF_VALUE: 27.7
PIF_VALUE: 27.5
PIF_VALUE: 21.1
PIF_VALUE: 26.8
PIF_VALUE: 7.9
PIF_VALUE: 29.7
PIF_VALUE: 26.3
PIF_VALUE: 27.9
PIF_VALUE: 30.8
PIF_VALUE: 18.7
PIF_VALUE: 26.7
PIF_VALUE: 32.3
PIF_VALUE: 22.4
PIF_VALUE: 23.3
PIF_VALUE: 7.3
PIF_VALUE: 10.9
PIF_VALUE: 15.9
PIF_VALUE: 26.2
PIF_VALUE: 31
PIF_VALUE: 28.8
PIF_VALUE: 16.6
PIF_VALUE: 27.7
PIF_VALUE: 26.3
PIF_VALUE: 33
PIF_VALUE: 23.5
PIF_VALUE: 26.4
PIF_VALUE: 31
PIF_VALUE: 27
PIF_VALUE: 30.3
PIF_VALUE: 40.3
PIF_VALUE: 34.7
PIF_VALUE: 26
PIF_VALUE: 27.8
PIF_VALUE: 27.3
PIF_VALUE: 20.4
PIF_VALUE: 29.8
PIF_VALUE: 33.5
PIF_VALUE: 27.6
PIF_VALUE: 20.5
PIF_VALUE: 27.3
PIF_VALUE: 23.5
PIF_VALUE: 28.5
PIF_VALUE: 25.6
PIF_VALUE: 27.1
PIF_VALUE: 37.2
PIF_VALUE: 26.9
PIF_VALUE: 27.3
PIF_VALUE: 28.1
PIF_VALUE: 7.4
PIF_VALUE: 27.4
PIF_VALUE: 27.5
PIF_VALUE: 27.8
PIF_VALUE: 28.1
PIF_VALUE: 20.5
PIF_VALUE: 28.7
PIF_VALUE: 28.6
PIF_VALUE: 26.2
PIF_VALUE: 26.3
PIF_VALUE: 30.7
PIF_VALUE: 7.9

## 2018-01-01 ASSESSMENT — PAIN DESCRIPTION - ORIENTATION
ORIENTATION: MID
ORIENTATION: RIGHT
ORIENTATION: RIGHT;LEFT
ORIENTATION: MID
ORIENTATION: RIGHT
ORIENTATION: RIGHT
ORIENTATION: MID
ORIENTATION: RIGHT
ORIENTATION: LEFT
ORIENTATION: RIGHT;LEFT
ORIENTATION: LEFT
ORIENTATION: RIGHT

## 2018-01-01 ASSESSMENT — ENCOUNTER SYMPTOMS
DIARRHEA: 0
RHINORRHEA: 0
SHORTNESS OF BREATH: 0
ABDOMINAL PAIN: 0
SORE THROAT: 0
SHORTNESS OF BREATH: 0
BACK PAIN: 0
DIARRHEA: 0
COUGH: 0
VOMITING: 0
EYES NEGATIVE: 1
NAUSEA: 0
NAUSEA: 0
GASTROINTESTINAL NEGATIVE: 1
GASTROINTESTINAL NEGATIVE: 1
RESPIRATORY NEGATIVE: 1
RESPIRATORY NEGATIVE: 1
VOMITING: 0
EYES NEGATIVE: 1

## 2018-01-01 ASSESSMENT — PAIN DESCRIPTION - LOCATION
LOCATION: RIB CAGE
LOCATION: THROAT;RIB CAGE
LOCATION: LEG
LOCATION: RIB CAGE
LOCATION: OTHER (COMMENT)
LOCATION: RIB CAGE
LOCATION: RIB CAGE
LOCATION: BACK
LOCATION: RIB CAGE
LOCATION: GENERALIZED
LOCATION: BACK;NECK
LOCATION: GENERALIZED
LOCATION: RIB CAGE
LOCATION: CHEST;BACK
LOCATION: RIB CAGE
LOCATION: GENERALIZED
LOCATION: BACK
LOCATION: HEAD
LOCATION: RIB CAGE
LOCATION: BACK;NECK
LOCATION: RIB CAGE
LOCATION: CHEST;BACK
LOCATION: RIB CAGE
LOCATION: LEG
LOCATION: RIB CAGE

## 2018-01-01 ASSESSMENT — PAIN DESCRIPTION - DESCRIPTORS
DESCRIPTORS: ACHING
DESCRIPTORS: ACHING
DESCRIPTORS: STABBING
DESCRIPTORS: ACHING;CONSTANT
DESCRIPTORS: ACHING;SORE
DESCRIPTORS: ACHING
DESCRIPTORS: ACHING;CONSTANT
DESCRIPTORS: ACHING
DESCRIPTORS: ACHING;CONSTANT;DISCOMFORT
DESCRIPTORS: ACHING
DESCRIPTORS: ACHING
DESCRIPTORS: SORE
DESCRIPTORS: ACHING
DESCRIPTORS: CONSTANT;DULL
DESCRIPTORS: ACHING
DESCRIPTORS: ACHING

## 2018-01-01 ASSESSMENT — PAIN DESCRIPTION - PAIN TYPE
TYPE: ACUTE PAIN
TYPE: CHRONIC PAIN
TYPE: ACUTE PAIN
TYPE: CHRONIC PAIN
TYPE: ACUTE PAIN
TYPE: ACUTE PAIN

## 2018-01-01 ASSESSMENT — PAIN DESCRIPTION - FREQUENCY
FREQUENCY: CONTINUOUS

## 2018-01-01 ASSESSMENT — PAIN SCALES - WONG BAKER
WONGBAKER_NUMERICALRESPONSE: 2
WONGBAKER_NUMERICALRESPONSE: 4
WONGBAKER_NUMERICALRESPONSE: 8

## 2018-09-13 PROBLEM — E16.2 HYPOGLYCEMIA: Status: ACTIVE | Noted: 2018-01-01

## 2018-09-14 ENCOUNTER — NURSE TRIAGE (OUTPATIENT)
Dept: CALL CENTER | Facility: HOSPITAL | Age: 78
End: 2018-09-14

## 2018-09-14 NOTE — PROGRESS NOTES
Progress Note    Admission Problem List: Active Hospital Problems    Diagnosis Date Noted    Hypoglycemia [E16.2] 09/13/2018     Priority: Low       Admit Date:  9/13/2018    Subjective:  Ms. Mendel Henri feels better dyspnea diminished denies chest pains. No other complaints. Objective:   BP (!) 148/71   Pulse 61   Temp 97.6 °F (36.4 °C) (Temporal)   Resp 20   Ht 5' 4\" (1.626 m)   Wt 118 lb (53.5 kg)   LMP 03/15/1970 (Approximate)   SpO2 100%   BMI 20.25 kg/m²     Intake/Output Summary (Last 24 hours) at 09/14/18 1413  Last data filed at 09/14/18 0839   Gross per 24 hour   Intake             2427 ml   Output              700 ml   Net             1727 ml       TELEMETRY: Sinus     Physical Exam:  Physical Exam    Vital Signs: BP (!) 148/71   Pulse 61   Temp 97.6 °F (36.4 °C) (Temporal)   Resp 20   Ht 5' 4\" (1.626 m)   Wt 118 lb (53.5 kg)   LMP 03/15/1970 (Approximate)   SpO2 100%   BMI 20.25 kg/m²   General:  Awake, alert, NAD  Skin:  Warm and dry  Neck:  JVD none  Chest:  Clear to auscultation, respiration easy  Cardiovascular:  RRR S1S2  Abdomen:  Soft NTND  Extremities: 1+  edema    Lab Data:  CBC: Recent Labs      09/13/18   1155  09/14/18   0142   WBC  5.8  7.3   HGB  11.1*  10.0*   HCT  36.0*  32.5*   MCV  95.5  95.9   PLT  272  273     BMP: Recent Labs      09/13/18   1155  09/14/18   0142   NA  145  141   K  4.6  4.6   CL  107  105   CO2  25  24   BUN  31*  34*   CREATININE  2.0*  2.3*     LIVER PROFILE:   Recent Labs      09/13/18   1155   AST  13   ALT  11   BILITOT  0.3   ALKPHOS  55     PT/INR:   Recent Labs      09/13/18   1155   PROTIME  14.3   INR  1.12     APTT:   Recent Labs      09/13/18   1155   APTT  24.8*     BNP:  No results for input(s): BNP in the last 72 hours. IMAGING:  Ct Head Wo Contrast    Result Date: 9/13/2018  Examination. CT HEAD WO CONTRAST History: Slurred speech. DLP: 17 78 mGycm . The CT scan of the head is performed without intravenous contrast enhancement.  The

## 2018-09-14 NOTE — TELEPHONE ENCOUNTER
"  Reason for Disposition  • Call about patient who is currently hospitalized    Additional Information  • Negative: Lab calling with strep throat test results and triager can call in prescription  • Negative: Lab calling with urinalysis test results and triager can call in prescription  • Negative: Medication questions  • Negative: ED call to PCP  • Negative: Physician call to PCP    Answer Assessment - Initial Assessment Questions  1. REASON FOR CALL or QUESTION: \"What is your reason for calling today?\" or \"How can I best  help you?\" or \"What question do you have that I can help answer?\"      Urine culture shows Ecoli, would Dr. Sterling like to start patient on antibiotic?   2. CALLER: Document the source of call. (e.g., laboratory, patient).      Kayla from [facility] [phone]    Protocols used: PCP CALL - NO TRIAGE-ADULT-    "

## 2018-09-14 NOTE — CONSULTS
[address] [facility]  Cardiology Consult    Requesting MD:  Terese Whitt MD Admit Status:  Inpatient       History obtained from:   [x] Patient  [] Other (specify):      Chief Complaint:   Chief Complaint   Patient presents with    Altered Mental Status    Hypoglycemia         HPI: Ms. Robert Handley is a 69 y.o. female with a history of hypertension peripheral and carotid arterial disease diabetes and hyperlipidemia who complains of increased dyspnea times one month. Today became diaphoretic came in blood sugar 47 admitted. BNP markedly elevated and chest xray c/w new onset CHF. She can walk about 100-150 yrds before having to stop. c/o multiples areas of pain bilateral legs with walking. Feeling better this evening. Review of Systems:  Review of Systems   Constitutional: Negative. HENT: Negative. Eyes: Negative. Respiratory: Negative. Gastrointestinal: Negative. Genitourinary: Negative. Musculoskeletal: Negative. Skin: Negative. Neurological: Negative. Endo/Heme/Allergies: Negative. Psychiatric/Behavioral: Negative.         Past Medical History:  Past Medical History:   Diagnosis Date    Arthritis     Atherosclerosis of native arteries of the extremities with intermittent claudication 9/22/2014    Cerebral artery occlusion with cerebral infarction (HCC)     Diabetes mellitus (HCC)     Diabetic vasculopathy (HCC)     Hypertension     Kidney stone     Peripheral vascular disease (HCC)     Superior mesenteric artery stenosis ([address] 9/23/2014        Past Surgical History:  Past Surgical History:   Procedure Laterality Date    APPENDECTOMY      CHOLECYSTECTOMY      COLONOSCOPY      FINGER TRIGGER RELEASE Right     HYSTERECTOMY  40 YEARS PRIOR    PARTIAL    KNEE SURGERY Right     TONSILLECTOMY         Past Family History:  Family History   Problem Relation Age of Onset    Hypertension Father     Diabetes Father     Stroke Mother     Cancer encounter: 5' 4\" (1.626 m). Weight as of this encounter: 118 lb (53.5 kg). Physical Exam   Constitutional: She is oriented to person, place, and time and well-developed, well-nourished, and in no distress. No distress. HENT:   Head: Normocephalic and atraumatic. Eyes: Pupils are equal, round, and reactive to light. Conjunctivae and EOM are normal. No scleral icterus. Neck: Normal range of motion. Carotid bruit is present. No tracheal deviation present. No thyromegaly present. Cardiovascular: Normal rate, regular rhythm, normal heart sounds and intact distal pulses. Exam reveals no gallop and no friction rub. No murmur heard. Musculoskeletal: She exhibits edema. Lymphadenopathy:     She has no cervical adenopathy. Neurological: She is alert and oriented to person, place, and time. No cranial nerve deficit. Skin: Skin is warm and dry. She is not diaphoretic. Psychiatric: Mood, memory, affect and judgment normal.       Labs:  Recent Labs      09/13/18   1155   WBC  5.8   HGB  11.1*   PLT  272     Recent Labs      09/13/18   1155   NA  145   K  4.6   CL  107   CO2  25   BUN  31*   CREATININE  2.0*   LABGLOM  24*   CALCIUM  9.2     Recent Labs      09/13/18   1155   PROBNP  19,203*       Last 3 BNP:  No results for input(s): BNP in the last 72 hours. Assessment:    1. Dysonea with elevated bnp c/w new onset CHF  2. Hypoglycemia  3. Bilateral carotid artery bruits  4. Tobacco abuse ongoing  5. HTN  6. Hyperlipidemia  7. Echo 3/14/2016 EF 60-97%, diastolic dysfunction suggested  8. CT Head neg 9/13/2018  9. MRI Brain 3/13/2016   1. Nonhemorrhagic acute ischemia of the left michael. The region of   diffusion restriction measures approximately 9 mm. No intracranial   hemorrhage. 2. Mild to moderate atrophy. 3. Hyperintense T2/FLAIR signal changes likely due to chronic small   vessel ischemia. 10. CT abdomen 1/18/2016   1.  Bilateral renal calculi, the largest one at or just proximal to the

## 2018-09-14 NOTE — PROGRESS NOTES
Nephrology ([facility]) Consult Note      Patient:  Dulce Buerger  YOB: 1940  Date of Service: 9/14/2018  MRN: [id number]   Acct: [de-identified]   Primary Care Physician: Rocky Alcala MD  Advance Directive: Full Code  Admit Date: 9/13/2018       Hospital Day: 1  Referring Provider: Rocky Alcala MD    Patient independently seen and examined, Chart, Consults, Notes, Operative notes, Labs, Cardiology, and Radiology studies reviewed as able. Result consultation: Acute and chronic kidney disease. Subjective:  Dulce Buerger is a 69 y.o. female  whom we were consulted for acute kidney injury/chronic kidney disease. She has stage IV chronic kidney disease and follows Dr. Saad May in the office. She presented with severe weakness and lack of energy she and disorientation. She was found to have severe hypoglycemia. 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 She is currently being treated with IV dextrose as well as adjustment of her home insulin dose. However she was found to have worsening of renal function.     Allergies:  Aspirin and Codeine    Medicines:  Current Facility-Administered Medications   Medication Dose Route Frequency Provider Last Rate Last Dose    insulin glargine (LANTUS) injection vial 18 Units  18 Units Subcutaneous Nightly Rocky Alcala MD        sodium chloride flush 0.9 % injection 10 mL  10 mL Intravenous 2 times per day Rocky Alcala MD        sodium chloride flush 0.9 % injection 10 mL  10 mL Intravenous PRN Rocky Alcala MD        acetaminophen (TYLENOL) tablet 650 mg  650 mg Oral Q4H PRN Rocky Alcala MD   650 mg at 09/14/18 0559    enoxaparin (LOVENOX) injection 30 mg  30 mg Subcutaneous QPM Rocky Alcala MD   30 mg at 09/13/18 1805    insulin lispro (HUMALOG) injection vial 0-12 Units  0-12 Units Subcutaneous TID WC Rocky Alcala MD   2 Units at PARTIAL    KNEE SURGERY Right     TONSILLECTOMY         Family History  Family History   Problem Relation Age of Onset    Hypertension Father     Diabetes Father     Stroke Mother     Cancer Mother     Diabetes Mother     Seizures Child        Social History  Social History     Social History    Marital status:      Spouse name: N/A    Number of children: 4    Years of education: 12     Occupational History    Not on file. Social History Main Topics    Smoking status: Current Every Day Smoker     Packs/day: 0.50     Last attempt to quit: 3/10/2016    Smokeless tobacco: Current User    Alcohol use No    Drug use: No    Sexual activity: Not on file     Other Topics Concern    Not on file     Social History Narrative    Born [place]     8 yrs ago     once    Has 2 sons and 2 daughters    Worked at [employer] 27 yrs     Education HS    [address] 1/2 ppd    Denies alcohol consumption or substance usage    Physically sedentary    Quite driving 4/6/7540    Walks with walker         Review of Systems:  History obtained from chart review and the patient  General ROS: No fever or chills  Respiratory ROS: positive for - shortness of breath  Cardiovascular ROS: positive for - dyspnea on exertion  Gastrointestinal ROS: No abdominal pain or melena  Genito-Urinary ROS: No dysuria or hematuria  Musculoskeletal ROS: No joint pain or swelling   14 point ROS reviewed with the patient and negative except as noted above and in the HPI unless unable to obtain. Objective:  Blood pressure (!) 152/55, pulse 67, temperature 97.8 °F (36.6 °C), temperature source Temporal, resp. rate 20, height 5' 4\" (1.626 m), weight 118 lb (53.5 kg), last menstrual period 03/15/1970, SpO2 97 %, not currently breastfeeding.     Intake/Output Summary (Last 24 hours) at 09/14/18 1042  Last data filed at 09/14/18 0609   Gross per 24 hour   Intake          2216.17 ml   Output

## 2018-09-14 NOTE — H&P
[facility] [employer] [facility]                               HISTORY AND PHYSICAL    PATIENT NAME: David Sierra                     :        1940  MED REC NO:   [id number]                              ROOM:       [facility]  ACCOUNT NO:   [de-identified]                           ADMIT DATE: 2018  PROVIDER:     Ezio Camacho MD    HISTORY OF PRESENT ILLNESS:  She has presented with a progressive dyspnea  and little bit of confusion, was shown to have a pretty significant low  blood sugar at one point of 47. Family had noted she was not clear minded  and had been repeating her cell phone they were speaking to her on the  phone. They came and evaluated and she was a little confused. Initially,  her blood sugar was in the 80s, which is probably low for her. She  probably used to have blood sugar in the mid upper 100s, but either way the  ambulance evaluated and got her blood sugar at 47. They gave her something  for that. She has not had any further low blood sugar. Her mental status  seemed to be clear in the emergency room, but she was noted to have  significant elevated BNP and new-onset CHF and was admitted for further  evaluation. Also noted to be in acute kidney injury pattern. PAST HISTORY:  Significant for hypertension, hyperlipidemia, diabetes under  poor control. She has carotid artery disease. She has lung disease and  COPD. She has a history of continued and ongoing tobacco use. Has a  history of severe peripheral vascular disease with claudication. She has  had severe mesenteric artery stenosis. She has had prior CVA, prior  tonsillectomy, knee surgery, hysterectomy, colonoscopy, cholecystectomy and  appendectomy. SOCIAL HISTORY:  She is a . She continues to be an everyday smoker,  half a pack a day or so.     FAMILY HISTORY:  For stroke and cancer, uncertain type diabetes and  hypertension as well as a child with seizure disorder. MEDICINES ON ADMISSION:  Include Pletal 50 mg twice daily; [address], she takes  one twice daily for chronic back problems; and Lasix 20 mg daily. She  takes Lantus 18 units nightly; lisinopril 20 mg twice daily; atenolol 50 mg  daily; Plavix 75 daily; Protonix 40 twice daily; Januvia 100 daily; and  meclizine, she takes as needed for chronic vertigo. PHYSICAL EXAMINATION:  VITAL SIGNS:  Blood pressure 154/64, pulse 69, respirations 18, temperature  97.6, O2 sat is 97% on room air. HEENT:  Normocephalic, atraumatic. NECK:  Supple. There are bruits bilaterally. CHEST:  Decreased breath sounds bilaterally. No rhonchi. No wheezes. HEART:  Regular. ABDOMEN:  Benign. EXTREMITIES:  No clubbing, cyanosis. There is some edema in the legs. LABORATORY EVALUATION:  Reveals sodium 145, potassium 4.6, chloride is 105,  CO2 is 24, BUN is 31, creatinine is 2.0, GFR initially 24 this morning, 21  after some Lasix. Glucose this morning is 119. ProBNP last night was  19,203 and this morning it is 23,238. Troponins have been negative x2 at  0.01. LFTs were negative. White count is 5.8, 11.1 for hemoglobin,  platelets 960. Urine shows trace leukocyte esterase, 3 to 5 white cells,  3+ bacteria. Cultures have been sent. CT of the brain was apparently  negative for any acute finding. There were some chronic ischemic and  atrophic findings, nothing to show any acute findings. X-ray of the chest  showed pulmonary edema and cardiomegaly. ASSESSMENT:  1. Acute congestive heart failure, which is new, uncertain type and we  will need to do a 2-D echo to help determine that. 2.  Acute kidney injury. 3.  Hypoglycemia likely related to the acute kidney injury and CHF. 4.  Diabetes mellitus. 5.  Hypertension  6. History of carotid artery disease. 7.  History of peripheral vascular disease. Cardiology and Nephrology have  been consulted. We will follow her closely.         Jesus Recinos Zoe Amezcua MD    D: 09/14/2018 8:32:07      T: 09/14/2018 8:34:56     [email or web address]  Job#: [id number]     Doc#: [id number]    CC:

## 2018-09-14 NOTE — PLAN OF CARE
Problem: Falls - Risk of:  Goal: Will remain free from falls  Will remain free from falls   Outcome: Ongoing    Goal: Absence of physical injury  Absence of physical injury   Outcome: Ongoing      Problem: Pain:  Goal: Pain level will decrease  Pain level will decrease   Outcome: Ongoing    Goal: Control of acute pain  Control of acute pain   Outcome: Ongoing    Goal: Control of chronic pain  Control of chronic pain   Outcome: Ongoing      Problem: HH FLUID RETENTION-CHF  Goal: Risk for excess fluid volume will decrease  Risk for excess fluid volume will decrease     Outcome: Ongoing    Goal: Control of fluid volume excess will improve  Control of fluid volume excess will improve     Outcome: Ongoing

## 2018-09-14 NOTE — PROGRESS NOTES
Bilateral lower extremity arterial segmental pressures with PPG performed. RT   LT    High thigh:  0.61   NC  Low thigh:  0.34   NC  Calf:   0.44   0.35  Ankle (PT):  0.30   0.35  Ankle (DP):  0.28   0.35          Digit:   0.16   0.26      This is a preliminary report. Final report pending.

## 2018-09-15 NOTE — PROGRESS NOTES
Nephrology ([facility]) Progress Note      Patient:  Freeman Bermeo  YOB: 1940  Date of Service: 9/15/2018  MRN: [id number]   Acct: [de-identified]   Primary Care Physician: Raymond Sibley MD  Advance Directive: Full Code  Admit Date: 9/13/2018       Hospital Day: 2  Referring Provider: Raymond Sibley MD    Patient independently seen and examined, Chart, Consults, Notes, Operative notes, Labs, Cardiology, and Radiology studies reviewed as able. Subjective:  Freeman Bermeo is a 69 y.o. female  whom we were consulted for acute kidney injury/chronic kidney disease. She has stage IV chronic kidney disease and follows Dr. Mona Shirley in the office. Presented with profound hypoglycemia and weakness. She was disoriented she was treated with D50 percent and her medications were adjusted. She also suffered an episode of acute kidney injury which is getting better.     This morning she is feeling much better    Allergies:  Aspirin and Codeine    Medicines:  Current Facility-Administered Medications   Medication Dose Route Frequency Provider Last Rate Last Dose    cilostazol (PLETAL) tablet 100 mg  100 mg Oral BID Raymond Sibley MD        insulin glargine (LANTUS) injection vial 18 Units  18 Units Subcutaneous Nightly Raymond Sibley MD   18 Units at 09/14/18 2203    amLODIPine (NORVASC) tablet 10 mg  10 mg Oral Daily Jacobo Khan MD   10 mg at 09/15/18 0803    bumetanide (BUMEX) tablet 1 mg  1 mg Oral Daily Jacobo Khan MD   1 mg at 09/15/18 0803    cefTRIAXone (ROCEPHIN) 1 g in sodium chloride (PF) 10 mL IV syringe  1 g Intravenous Q24H Raymond Sibley MD   1 g at 09/14/18 1708    sodium chloride flush 0.9 % injection 10 mL  10 mL Intravenous 2 times per day Raymond Sibley MD   10 mL at 09/15/18 0803    sodium chloride flush 0.9 % injection 10 mL  10 mL Intravenous PRN Raymond Sibley MD        acetaminophen (TYLENOL) tablet 650 mg  650 mg Oral Q4H PRN Raymond Sibley MD 650 mg at 09/14/18 1551    enoxaparin (LOVENOX) injection 30 mg  30 mg Subcutaneous QPM Xenia Vargas MD   30 mg at 09/14/18 1708    insulin lispro (HUMALOG) injection vial 0-12 Units  0-12 Units Subcutaneous TID WC Xenia Vargas MD   2 Units at 09/14/18 1710    insulin lispro (HUMALOG) injection vial 0-6 Units  0-6 Units Subcutaneous Nightly Xenia Vargas MD   2 Units at 09/14/18 2203    glucose (GLUTOSE) 40 % oral gel 15 g  15 g Oral PRN Xenia Vargas MD        dextrose 50 % solution 12.5 g  12.5 g Intravenous PRN Xenia Vargas MD        glucagon (rDNA) injection 1 mg  1 mg Intramuscular PRN Xenia Vargas MD        dextrose 5 % solution  100 mL/hr Intravenous JENNIFER Vargas MD        atenolol (TENORMIN) tablet 50 mg  50 mg Oral Daily Xenia Vargas MD   50 mg at 09/15/18 0802    clopidogrel (PLAVIX) tablet 75 mg  75 mg Oral Daily Xenia Vargas MD   75 mg at 09/15/18 0803    HYDROcodone-acetaminophen ([facility]) 7.5-325 MG per tablet 1 tablet  1 tablet Oral Q6H PRN Xenia Vargas MD   1 tablet at 09/15/18 0803    pantoprazole (PROTONIX) tablet 40 mg  40 mg Oral BID AC Xenia Vargas MD   40 mg at 09/15/18 0600    meclizine (ANTIVERT) tablet 25 mg  25 mg Oral Daily Xenia Vargas MD   25 mg at 09/15/18 7549    cloNIDine (CATAPRES) tablet 0.1 mg  0.1 mg Oral Q4H PRN Xenia Vargas MD           Past Medical History:  Past Medical History:   Diagnosis Date    Arthritis     Atherosclerosis of native arteries of the extremities with intermittent claudication 9/22/2014    [facility]erebral [facility] occlusion with [facility] infarction [address] [address]     Diabetes mellitus ([address]     Diabetic vasculopathy ([address]     Hypertension     Kidney stone     Peripheral vascular disease ([address]     Superior mesenteric artery stenosis ([address] 9/23/2014       Past Surgical History:  Past Surgical History:   Procedure Laterality Date    APPENDECTOMY      CHOLECYSTECTOMY      COLONOSCOPY      FINGER 4 24 hour   Intake             1290 ml   Output              300 ml   Net              990 ml     General: awake/alert   HEENT: Normocephalic atraumatic head  Neck: Supple with no JVD or carotid bruits. Chest:  clear to auscultation bilaterally without respiratory distress  CVS: regular rate and rhythm  Abdominal: soft, nontender, normal bowel sounds  Extremities: no cyanosis or edema  Skin: warm and dry without rash      Labs:  BMP: Recent Labs      09/13/18   1155  09/14/18   0142  09/15/18   0141   NA  145  141  141   K  4.6  4.6  4.6   CL  107  105  106   CO2  25  24  23   BUN  31*  34*  39*   CREATININE  2.0*  2.3*  2.3*   CALCIUM  9.2  8.7*  8.0*     CBC: Recent Labs      09/13/18   1155  09/14/18   0142  09/15/18   0141   WBC  5.8  7.3  6.1   HGB  11.1*  10.0*  9.0*   HCT  36.0*  32.5*  29.5*   MCV  95.5  95.9  94.9   PLT  272  273  246     LIVER PROFILE:   Recent Labs      09/13/18   1155  09/15/18   0141   AST  13  11   ALT  11  8   BILITOT  0.3  <0.2   ALKPHOS  55  42     PT/INR:   Recent Labs      09/13/18 1155   PROTIME  14.3   INR  1.12     APTT:   Recent Labs      09/13/18 1155   APTT  24.8*     BNP:  No results for input(s): BNP in the last 72 hours. Ionized Calcium:No results for input(s): IONCA in the last 72 hours. Magnesium:No results for input(s): MG in the last 72 hours. Phosphorus:No results for input(s): PHOS in the last 72 hours. HgbA1C: No results for input(s): LABA1C in the last 72 hours. Hepatic: Recent Labs      09/13/18   1155  09/15/18   0141   ALKPHOS  55  42   ALT  11  8   AST  13  11   PROT  5.8*  5.3*   BILITOT  0.3  <0.2   LABALBU  3.7  3.2*     Lactic Acid: No results for input(s): LACTA in the last 72 hours. Troponin: No results for input(s): CKTOTAL, CKMB, TROPONINT in the last 72 hours. ABGs: No results for input(s): PH, PCO2, PO2, HCO3, O2SAT in the last 72 hours. CRP:  No results for input(s): CRP in the last 72 hours.   Sed Rate:  No results for input(s): SEDRATE in the last 72 hours. Cultures:   No results for input(s): CULTURE in the last 72 hours. No results for input(s): BC, Cecy Clubs in the last 72 hours. No results for input(s): CXSURG in the last 72 hours. Radiology reports as per the Radiologist  Radiology: Ct Head Wo Contrast    Result Date: 9/13/2018  Examination. CT HEAD WO CONTRAST History: Slurred speech. DLP: 17 78 mGycm . The CT scan of the head is performed without intravenous contrast enhancement. The images are acquired in axial plane with subsequent reconstruction in coronal and sagittal planes. The comparison is made with the previous study dated 3/13/2016. This studies are limited diagnostic value due to motion and imaging artifacts. There is no evidence of a mass or midline shift. There is no evidence of intracranial hemorrhage or hematoma. Moderately dilated ventricles, basal cisterns cortical sulci are noted suggesting chronic volume loss/atrophy. There are scattered areas of chronic white matter ischemia. There is normal gray-white matter differentiation. The images reviewed in bone window show no acute bony abnormality. The visualized paranasal sinuses and mastoid air cells are normal.    No acute intracranial abnormality. Chronic ischemic and atrophic changes. This study may not rule out an acute nonhemorrhagic infarction. If clinically warranted please obtain MR imaging of the brain. The above findings are recorded on a digital voice clip in PACS. Signed by Dr Beck Smith on 9/13/2018 1:47 PM    Xr Chest Portable    Result Date: 9/13/2018  XR CHEST PORTABLE 9/13/2018 11:15 AM HISTORY: Altered mental status COMPARISON: 9/25/2016. FINDINGS: New volume loss in the left lung base, likely secondary to lower lobe atelectasis. Coarsened interstitial markings identified in both lungs. Cardiomegaly is identified and there is appear to be enlargement of the central pulmonary vessels. No pleural effusion or pneumothorax.  The osseous structures +---------++--------+-----+---------------++--------+-----+----------------+ ! Location ! !Pressure! Ratio! PPG Wave Form  ! !Pressure! Ratio! PPG Wave Form   ! +---------++--------+-----+---------------++--------+-----+----------------+ ! Great Toe!!29      !0.16 !               !!45      !0.26 !                ! +---------++--------+-----+---------------++--------+-----+----------------+       Assessment   1. Acute kidney injury/ATN  2. Stage IV chronic kidney disease. 3. Diabetic nephropathy. 4. Severe hypoglycemia resolving. 5. Insulin-dependent type II diabetes. 6. Diastolic congestive heart failure, stable    Plan:  1. IV fluid administration  2. Decrease home diuretics.   3. Continue to monitor      Jaimie Gale MD  09/15/18  1:41 PM

## 2018-09-15 NOTE — PROGRESS NOTES
Plan:   1. Acute CHF: Carefully diuresing due to the BALJINDER. 2. BALJINDER with CKD: Dr Guillermo Mcguire is seeing the patient. 3. DM w/ hypoglycemia: Holding diabetes meds. 4. PVD w/ claudication at rest: The pain she is having is severe at times. 5. Bilateral carotid disease: Watch and she is on plavix. 6. UTI: E coli and she has been on iV ceftriaxone. C&S pending. Advance Directive: Full Code  DVT prophylaxis with enoxaparin 30 mg sub-Q daily. Discharge planning: Home. Active Problems:    Hypoglycemia  Resolved Problems:    * No resolved hospital problems.  Jethro Mclain MD

## 2018-09-16 NOTE — PROGRESS NOTES
Nephrology ([facility]) Progress Note      Patient:  Caitlin Edwards  YOB: 1940  Date of Service: 9/16/2018  MRN: [id number]   Acct: [de-identified]   Primary Care Physician: Teresa Wallace MD  Advance Directive: Full Code  Admit Date: 9/13/2018       Hospital Day: 3  Referring Provider: Teresa Wallace MD    Patient independently seen and examined, Chart, Consults, Notes, Operative notes, Labs, Cardiology, and Radiology studies reviewed as able. Subjective:  Caitlin Edwards is a 69 y.o. female  whom we were consulted for acute kidney injury/chronic kidney disease. She has stage IV chronic kidney disease and follows Dr. Bhavin Lowry in the office. Presented with profound hypoglycemia and weakness. She was disoriented she was treated with D50 percent and her medications were adjusted. She also suffered an episode of acute kidney injury which is getting better. Patient is feeling much better and blood sugar has improved. She was able to walk around in the hallways. She is complaining of right lower extremity pain.     Allergies:  Aspirin and Codeine    Medicines:  Current Facility-Administered Medications   Medication Dose Route Frequency Provider Last Rate Last Dose    gabapentin (NEURONTIN) capsule 100 mg  100 mg Oral BID Teresa Wallace MD        cilostazol (PLETAL) tablet 100 mg  100 mg Oral BID Teresa Wallace MD   100 mg at 09/15/18 2204    0.9 % sodium chloride infusion   Intravenous Continuous Malissa Watts MD 75 mL/hr at 09/15/18 1437      insulin glargine (LANTUS) injection vial 18 Units  18 Units Subcutaneous Nightly Teresa Wallace MD   18 Units at 09/15/18 2204    amLODIPine (NORVASC) tablet 10 mg  10 mg Oral Daily Malissa Watts MD   10 mg at 09/15/18 0803    bumetanide (BUMEX) tablet 1 mg  1 mg Oral Daily Malissa Watts MD   1 mg at 09/15/18 0803    cefTRIAXone (ROCEPHIN) 1 g in sodium chloride (PF) 10 mL IV syringe  1 g Intravenous Q24H Teresa Wallace MD   1 g at 09/15/18 1724    sodium chloride flush 0.9 % injection 10 mL  10 mL Intravenous 2 times per day Teresa Wallace MD   10 mL at 09/15/18 2213    sodium chloride flush 0.9 % injection 10 mL  10 mL Intravenous PRN Teresa Wallace MD        acetaminophen (TYLENOL) tablet 650 mg  650 mg Oral Q4H PRN Teresa Wallace MD   650 mg at 09/14/18 1551    enoxaparin (LOVENOX) injection 30 mg  30 mg Subcutaneous QPM Teresa Wallace MD   30 mg at 09/15/18 1724    insulin lispro (HUMALOG) injection vial 0-12 Units  0-12 Units Subcutaneous TID WC Teresa Wallace MD   2 Units at 09/14/18 1710    insulin lispro (HUMALOG) injection vial 0-6 Units  0-6 Units Subcutaneous Nightly Teresa Wallace MD   2 Units at 09/14/18 2203    glucose (GLUTOSE) 40 % oral gel 15 g  15 g Oral PRN Teresa Wallace MD        dextrose 50 % solution 12.5 g  12.5 g Intravenous PRN Teresa Wallace MD        glucagon (rDNA) injection 1 mg  1 mg Intramuscular PRN Teresa Wallace MD        dextrose 5 % solution  100 mL/hr Intravenous PRN Teresa Wallace MD        atenolol (TENORMIN) tablet 50 mg  50 mg Oral Daily Teresa Wallace MD   50 mg at 09/15/18 0802    clopidogrel (PLAVIX) tablet 75 mg  75 mg Oral Daily Teresa Wallace MD   75 mg at 09/15/18 0803    HYDROcodone-acetaminophen ([address]) 7.5-325 MG per tablet 1 tablet  1 tablet Oral Q6H PRN Teresa Wallace MD   1 tablet at 09/16/18 0500    pantoprazole (PROTONIX) tablet 40 mg  40 mg Oral BID AC Teresa Wallace MD   40 mg at 09/16/18 0500    meclizine (ANTIVERT) tablet 25 mg  25 mg Oral Daily Teresa Wallace MD   25 mg at 09/15/18 7405    cloNIDine (CATAPRES) tablet 0.1 mg  0.1 mg Oral Q4H PRN Teresa Wallace MD           Past Medical History:  Past Medical History:   Diagnosis Date    Arthritis     Atherosclerosis of native arteries of the extremities with intermittent claudication 9/22/2014    [facility] [address]     [facility] [facility]     Diabetic vasculopathy ([facility] [address] [facility]     Hypertension     Kidney stone     Peripheral vascular disease ([facility] [facility]     Superior mesenteric artery stenosis ([address] 9/23/2014       Past Surgical History:  Past Surgical History:   Procedure Laterality Date    APPENDECTOMY      CHOLECYSTECTOMY      COLONOSCOPY      FINGER TRIGGER RELEASE Right     HYSTERECTOMY  40 YEARS PRIOR    PARTIAL    KNEE SURGERY Right     TONSILLECTOMY         Family History  Family History   Problem Relation Age of Onset    Hypertension Father     Diabetes Father     Stroke Mother     Cancer Mother     Diabetes Mother     Seizures Child        Social History  Social History     Social History    Marital status:      Spouse name: N/A    Number of children: 4    Years of education: 12     Occupational History    Not on file. Social History Main Topics    Smoking status: Current Every Day Smoker     Packs/day: 0.50     Last attempt to quit: 3/10/2016    Smokeless tobacco: Current User    Alcohol use No    Drug use: No    Sexual activity: Not on file     Other Topics Concern    Not on file     Social History Narrative    Born [place]     8 yrs ago     once    Has 2 sons and 2 daughters    Worked at [employer] 27 yrs     Education HS    [address]2 [address]    Denies alcohol consumption or substance usage    Physically sedentary    Quite driving 8/7/1037    Walks with walker         Review of Systems:  History obtained from chart review and the patient  General ROS: No fever or chills  Respiratory ROS: No cough, shortness of breath, wheezing  Cardiovascular ROS: No chest pain or palpitations  Gastrointestinal ROS: No abdominal pain or melena  Genito-Urinary ROS: No dysuria or hematuria  Musculoskeletal ROS: No joint pain or swelling   14 point ROS reviewed with the patient and negative except as noted above and in the HPI unless unable to obtain.     Objective:  Blood Date: 9/13/2018  XR CHEST PORTABLE 9/13/2018 11:15 AM HISTORY: Altered mental status COMPARISON: 9/25/2016. FINDINGS: New volume loss in the left lung base, likely secondary to lower lobe atelectasis. Coarsened interstitial markings identified in both lungs. Cardiomegaly is identified and there is appear to be enlargement of the central pulmonary vessels. No pleural effusion or pneumothorax. The osseous structures and surrounding soft tissues demonstrate no acute abnormality. 1. Likely atelectasis in the left lung base. 2. Cardiomegaly with pulmonary congestion. Likely interstitial edema. Signed by Dr Donald Zheng on 9/13/2018 12:39 PM    Vl Dup Carotid Bilateral    Result Date: 9/14/2018  Vascular Carotid Procedure  Demographics   Patient Name     Agustina Sneed Age                    69   Patient Number   [id number]         Gender                 Female   Visit Number     [id number]      Interpreting Physician Malika Weber MD   Date of Birth    1940     Referring Physician    Usama Oropeza MD   Accession Number [id number]      Sonographer            Kenney Soto RVT  Procedure Type of Study:   Cerebral:Carotid, VL CAROTID BILATERAL. Indications for Study:Bruit. Risk Factors   - The patient's risk factor(s) include: diabetes mellitus and arterial     hypertension.   - The patient has a current/recent (within 1 year) tobacco history. - The patient's risk factor(s) include: Prior CVA, ,  Impression   There is heterogeneous plaque visualized in the bilateral internal carotid  artery(ies). There is 50-69% stenosis in the right internal carotid artery. There is 50-69% stenosis of the left internal carotid artery. There is normal antegrade flow in the bilateral vertebral artery(ies).    Signature   ----------------------------------------------------------------  Electronically signed by Malika Weber MD(Interpreting  physician) on 09/14/2018 04:50 PM arterial     hypertension.   - The patient has a current/recent (within 1 year) tobacco history. Technical Quality:Limited visualization due to patient unable to relax legs. Impression   The patient has severely diminished ankle-brachial indices bilateral lower  extremity(ies) which would be consistent with rest pain symptoms. Based on segmental pressures and doppler waveforms, the patient likely has  disease in the bilateral superficial femoral, popliteal, tibial arterial  segments. Signature   ----------------------------------------------------------------  Electronically signed by Laura Park MD(Interpreting  physician) on 09/14/2018 04:57 PM  ----------------------------------------------------------------  Velocities are measured in cm/s ; Diameters are measured in mm Pressures +--------------------------------------++--------+-----+----+--------+-----+ ! ! !Right   ! ! Left!        !     ! +--------------------------------------++--------+-----+----+--------+-----+ ! Location                              ! !Pressure! Ratio! !Pressure! Ratio! +--------------------------------------++--------+-----+----+--------+-----+ ! Upper Thigh                           !!107     !0.61 !    !145     ! 0.82 ! +--------------------------------------++--------+-----+----+--------+-----+ ! Lower Thigh                           !!59      !0.34 !    !80      !0.45 ! +--------------------------------------++--------+-----+----+--------+-----+ ! Calf                                  !!77      !0.44 !    !62      !0.35 ! +--------------------------------------++--------+-----+----+--------+-----+ ! Ankle PT                              !!52      !0.3  ! !62      !0.35 ! +--------------------------------------++--------+-----+----+--------+-----+ ! DP                                    !!49      !0.28 !    !61      !0.35 !

## 2018-09-16 NOTE — PROGRESS NOTES
Progress Note  9/16/2018 9:28 AM  Subjective:   Admit Date: 9/13/2018  PCP: Gene Fraire MD    Interval History: \"i AM STILL IN A LOT OF PAIN IN MY LEGS AND FEET. \"    DIET CARB CONTROL;     Intake/Output Summary (Last 24 hours) at 09/16/18 0928  Last data filed at 09/16/18 0247   Gross per 24 hour   Intake              940 ml   Output              550 ml   Net              390 ml     Medications:      sodium chloride 75 mL/hr at 09/15/18 1437    dextrose        cilostazol  100 mg Oral BID    insulin glargine  18 Units Subcutaneous Nightly    amLODIPine  10 mg Oral Daily    bumetanide  1 mg Oral Daily    cefTRIAXone (ROCEPHIN) IV  1 g Intravenous Q24H    sodium chloride flush  10 mL Intravenous 2 times per day    enoxaparin  30 mg Subcutaneous QPM    insulin lispro  0-12 Units Subcutaneous TID WC    insulin lispro  0-6 Units Subcutaneous Nightly    atenolol  50 mg Oral Daily    clopidogrel  75 mg Oral Daily    pantoprazole  40 mg Oral BID AC    meclizine  25 mg Oral Daily     Recent Labs      09/14/18   0142  09/15/18   0141  09/16/18   0148   WBC  7.3  6.1  6.2   HGB  10.0*  9.0*  9.9*   PLT  273  246  257     Recent Labs      09/14/18   0142  09/15/18   0141  09/16/18   0148   NA  141  141  139   K  4.6  4.6  4.4   CL  105  106  105   CO2  24  23  23   BUN  34*  39*  42*   CREATININE  2.3*  2.3*  2.3*   GLUCOSE  119*  122*  109     Recent Labs      09/13/18   1155  09/15/18   0141  09/16/18   0148   AST  13  11  12   ALT  11  8  8   BILITOT  0.3  <0.2  <0.2   ALKPHOS  55  42  43       Objective:   Vitals: /60   Pulse 57   Temp 97.6 °F (36.4 °C) (Temporal)   Resp 18   Ht 5' 4\" (1.626 m)   Wt 118 lb (53.5 kg)   LMP 03/15/1970 (Approximate)   SpO2 94%   BMI 20.25 kg/m²   General appearance: alert and cooperative with exam  Lungs: Bibasilar rales  Heart: regular rate and rhythm, S1, S2 normal, no murmur, click, rub or gallop  Abdomen: soft, non-tender; bowel sounds normal; no masses,  no

## 2018-09-16 NOTE — PROGRESS NOTES
2. diabetes show no change   While the primary care provider manages this disease state, I have personally reviewed these laboratory values today as they influence the cardiovascular condition that I currently manage  No Continue current medications:                      3. hypertension show no change The blood pressure for the lastr 36 hours has been:  Systolic (68FLN), OZV:084 , Min:129 , KTV:080    Diastolic (39GZV), JSL:87, Min:59, Max:71    No Continue current medications:                 PLAN:    1. Continue present medications except for changes as noted above  2. Continue to monitor rhythm  3. Further orders per clinical course. Discussed with patient and nursing.     Electronically signed by Jonathan Means MD on 9/15/18    [id number] [facility]

## 2018-09-17 NOTE — PROGRESS NOTES
Onset    Hypertension Father     Diabetes Father     Stroke Mother     Cancer Mother     Diabetes Mother     Seizures Child      Social History   Substance Use Topics    Smoking status: Current Every Day Smoker     Packs/day: 0.50     Last attempt to quit: 3/10/2016    Smokeless tobacco: Current User    Alcohol use No          Review of Systems:    General:      Complaint / Symptom Yes / No / Description if Yes       Fatigue Yes:  chronic   Weight gain NA   Insomnia NA       Respiratory:        Complaint / Symptom Yes / No / Description if Yes       Cough No   Horseness NA       Cardiovascular:    Complaint / Symptom Yes / No / Description if Yes       Chest Pain No   Shortness of Air / Orthopnea Yes: chronic and stable   Presyncope / Syncope No   Palpitations No         Objective:    BP (!) 140/71   Pulse 83   Temp 97.2 °F (36.2 °C) (Temporal)   Resp 18   Ht 5' 4\" (1.626 m)   Wt 118 lb (53.5 kg)   LMP 03/15/1970 (Approximate)   SpO2 97%   BMI 20.25 kg/m² ,   Intake/Output Summary (Last 24 hours) at 09/16/18 2000  Last data filed at 09/16/18 1827   Gross per 24 hour   Intake             1420 ml   Output             1700 ml   Net             -280 ml       GENERAL - well developed and well nourished, is an active participant in this examination  HEENT   PERRLA, Hearing appears normal, conjunctiva and lids are normal, ears and nose appear normal  NECK - no thyromegaly, no JVD, trachea is in the midline  CARDIOVASCULAR  PMI is in the left mid line clavicular position, Normal S1 and S2 with a grade 1/6 systolic murmur. No S3 or S4    PULMONARY  No respiratory distress. scattered wheezes and rales.   Breath sounds in both  lung fields are Decreased  ABDOMEN   soft, non tender, no rebound, no hepatomegaly or splenomegaly  MUSCULOSKELETAL   Prone/Supine, digitals and nails are without clubbing or cyanosis  EXTREMITIES - trace+ edema  NEUROLOGIC - cranial nerves, II-XII, are normal  SKIN - turgor is

## 2018-09-17 NOTE — PROGRESS NOTES
mL Intravenous PRN Raymond Sibley MD        acetaminophen (TYLENOL) tablet 650 mg  650 mg Oral Q4H PRN Raymond Sibley MD   650 mg at 09/14/18 1551    enoxaparin (LOVENOX) injection 30 mg  30 mg Subcutaneous QPM Raymond Sibley MD   30 mg at 09/16/18 1820    insulin lispro (HUMALOG) injection vial 0-12 Units  0-12 Units Subcutaneous TID WC Raymond Sibley MD   4 Units at 09/16/18 1821    insulin lispro (HUMALOG) injection vial 0-6 Units  0-6 Units Subcutaneous Nightly Raymond Sibley MD   2 Units at 09/14/18 2203    glucose (GLUTOSE) 40 % oral gel 15 g  15 g Oral PRN Raymond Sibley MD        dextrose 50 % solution 12.5 g  12.5 g Intravenous PRN Raymond Sibley MD        glucagon (rDNA) injection 1 mg  1 mg Intramuscular PRN Raymond Sibley MD        dextrose 5 % solution  100 mL/hr Intravenous PRN Raymond Sibley MD        atenolol (TENORMIN) tablet 50 mg  50 mg Oral Daily Raymond Sibley MD   50 mg at 09/16/18 1202    clopidogrel (PLAVIX) tablet 75 mg  75 mg Oral Daily Raymond Sibley MD   75 mg at 09/17/18 0944    HYDROcodone-acetaminophen (NORCO) 7.5-325 MG per tablet 1 tablet  1 tablet Oral Q6H PRN Raymond Sibley MD   1 tablet at 09/17/18 0947    pantoprazole (PROTONIX) tablet 40 mg  40 mg Oral BID AC Raymond Sibley MD   40 mg at 09/17/18 0946    meclizine (ANTIVERT) tablet 25 mg  25 mg Oral Daily Raymond Sibley MD   25 mg at 09/17/18 0944    cloNIDine (CATAPRES) tablet 0.1 mg  0.1 mg Oral Q4H PRN Raymond Sibley MD           Past Medical History:  Past Medical History:   Diagnosis Date    Arthritis     Atherosclerosis of native arteries of the extremities with intermittent claudication 9/22/2014    [facility]erebral artery occlusion with [facility]     Diabetes mellitus ([address]     Diabetic vasculopathy ([address]     Hypertension     Kidney stone     Peripheral vascular disease ([address]     Superior mesenteric artery stenosis ([address] 9/23/2014       Past Surgical History:  Past Surgical History:   Procedure Laterality Date    APPENDECTOMY      CHOLECYSTECTOMY      COLONOSCOPY      FINGER TRIGGER RELEASE Right     HYSTERECTOMY  40 YEARS PRIOR    PARTIAL    KNEE SURGERY Right     TONSILLECTOMY         Family History  Family History   Problem Relation Age of Onset    Hypertension Father     Diabetes Father     Stroke Mother     Cancer Mother     Diabetes Mother     Seizures Child        Social History  Social History     Social History    Marital status:      Spouse name: N/A    Number of children: 4    Years of education: 12     Occupational History    Not on file. Social History Main Topics    Smoking status: Current Every Day Smoker     Packs/day: 0.50     Last attempt to quit: 3/10/2016    Smokeless tobacco: Current User    Alcohol use No    Drug use: No    Sexual activity: Not on file     Other Topics Concern    Not on file     Social History Narrative    Born [place]     8 yrs ago     once    Has 2 sons and 2 daughters    Worked at [employer] 27 yrs     Education HS    [address] 1/2 [address]    Denies alcohol consumption or substance usage    Physically sedentary    Quite driving 1/7/1460    Walks with walker         Review of Systems:  History obtained from chart review and the patient  General ROS: No fever or chills  Respiratory ROS: No cough, +shortness of breath  Cardiovascular ROS: No chest pain or palpitations  Gastrointestinal ROS: No abdominal pain or melena  Genito-Urinary ROS: No dysuria or hematuria  Musculoskeletal ROS: No joint pain or swelling         Objective:  Blood pressure 125/64, pulse 59, temperature 98.3 °F (36.8 °C), temperature source Temporal, resp. rate 20, height 5' 4\" (1.626 m), weight 118 lb (53.5 kg), last menstrual period 03/15/1970, SpO2 93 %, not currently breastfeeding.     Intake/Output Summary (Last 24 hours) at 09/17/18 9007  Last data filed at 09/17/18 surrounding soft tissues demonstrate no acute abnormality. 1. Likely atelectasis in the left lung base. 2. Cardiomegaly with pulmonary congestion. Likely interstitial edema. Signed by Dr Georges Kennedy on 9/13/2018 12:39 PM    Vl Dup Carotid Bilateral    Result Date: 9/14/2018  Vascular Carotid Procedure  Demographics   Patient Name     Maria Dolores Maurer Age                    69   Patient Number   [id number]         Gender                 Female   Visit Number     [id number]      Interpreting Physician Janet Leonard MD   Date of Birth    1940     Referring Physician    Fauzia Cruz MD   Accession Number [id number]      Sonographer            Naeem Metzger RVT  Procedure Type of Study:   Cerebral:Carotid, VL CAROTID BILATERAL. Indications for Study:Bruit. Risk Factors   - The patient's risk factor(s) include: diabetes mellitus and arterial     hypertension.   - The patient has a current/recent (within 1 year) tobacco history. - The patient's risk factor(s) include: Prior CVA, ,  Impression   There is heterogeneous plaque visualized in the bilateral internal carotid  artery(ies). There is 50-69% stenosis in the right internal carotid artery. There is 50-69% stenosis of the left internal carotid artery. There is normal antegrade flow in the bilateral vertebral artery(ies). Signature   ----------------------------------------------------------------  Electronically signed by Janet Leonard MD(Interpreting  physician) on 09/14/2018 04:50 PM  ----------------------------------------------------------------  Velocities are measured in cm/s ; Diameters are measured in mm Carotid Right Measurements +------------+------+-------+--------+-------+------------+----------------+ ! Location    ! PSV   ! EDV    ! Angle   ! RI     !%Stenosis   ! Tortuosity      ! +------------+------+-------+--------+-------+------------+----------------+ ! Prox CCA    !137   !18.3   ! 60      !0.87   !            !                ! +------------+------+-------+--------+-------+------------+----------------+ ! Mid CCA     !143   !21     !60      !0.85   !            !                ! +------------+------+-------+--------+-------+------------+----------------+ ! Prox ICA    !120   !10.8   !60      !0.91   !            !                ! +------------+------+-------+--------+-------+------------+----------------+ ! Mid ICA     !165   !29.5   !60      !0.82   !            !                ! +------------+------+-------+--------+-------+------------+----------------+ ! Dist ICA    !107   !25.5   !60      !0.76   !            !                ! +------------+------+-------+--------+-------+------------+----------------+ ! Prox ECA    !196   !15.7   !60      !0.92   !            !                ! +------------+------+-------+--------+-------+------------+----------------+ ! Vertebral   !124   !22.6   !60      !0.82   !            !                ! +------------+------+-------+--------+-------+------------+----------------+   - There is antegrade vertebral flow noted on the right side. - Additional Measurements:ICAPSV/CCAPSV 1.2. ICAEDV/CCAEDV 1.61. Carotid Left Measurements +------------+------+-------+--------+-------+------------+----------------+ ! Location    ! PSV   ! EDV    ! Angle   ! RI     !%Stenosis   ! Tortuosity      ! +------------+------+-------+--------+-------+------------+----------------+ ! Prox CCA    !150   !19.6   !60      !0.87   !            !                ! +------------+------+-------+--------+-------+------------+----------------+ ! Mid CCA     !183   !23.6   ! 60      !0.87   !            !                ! +------------+------+-------+--------+-------+------------+----------------+ ! Prox ICA    ! 168   !46.8   !60      !0.72   !            !                ! +------------+------+-------+--------+-------+------------+----------------+ ! Mid ICA     !140   !29.9   !60      !0.79   !            !                ! +------------+------+-------+--------+-------+------------+----------------+ ! Dist ICA    !124   !33.7   !56      !0.73   !            !                ! +------------+------+-------+--------+-------+------------+----------------+ ! Prox ECA    !210   !       !61      !       !            !                ! +------------+------+-------+--------+-------+------------+----------------+ ! Vertebral   !135   !17.3   ! 60      !0.87   !            !                ! +------------+------+-------+--------+-------+------------+----------------+   - There is antegrade vertebral flow noted on the left side. - Additional Measurements:ICAPSV/CCAPSV 1.12. ICAEDV/CCAEDV 2.39. Vl Lower Extremity Arterial Segmental Pressures W Ppg    Result Date: 9/14/2018  Vascular Lower Arterial Plethysmography Procedure  Demographics   Patient Name     Sheldon Carreon Age                    69   Patient Number   [id number]         Gender                 Female   Visit Number     [id number]      Interpreting Physician Derrick Chao MD   Date of Birth    1940     Referring Physician    Rios Rojas MD [facility]   Accession Number [id number]      [address] [place] [address]  Procedure Type of Study:   Extremities Arteries:Lower Arterial Plethysmography, VASC LOWER EXTREMITY  ART SEGMENTAL PRESSURES W PPG. Indications for Study:Claudication. Risk Factors   - The patient's risk factor(s) include: diabetes mellitus and arterial     hypertension.   - The patient has a current/recent (within 1 year) tobacco history. Technical Quality:Limited visualization due to patient unable to relax legs. Impression   The patient has severely diminished ankle-brachial indices bilateral lower  extremity(ies) which would be consistent with rest pain symptoms.   Based on segmental pressures and doppler waveforms, the patient likely has  disease in the bilateral superficial femoral, +---------++--------+-----+---------------++--------+-----+----------------+ ! Location ! !Pressure! Ratio! PPG Wave Form  ! !Pressure! Ratio! PPG Wave Form   ! +---------++--------+-----+---------------++--------+-----+----------------+ ! Great Toe!!29      !0.16 !               !!45      !0.26 !                ! 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

## 2018-09-17 NOTE — PROGRESS NOTES
Progress Note  9/17/2018 7:10 AM  Subjective:   Admit Date: 9/13/2018  PCP: Eliseo Zhong MD    Interval History:     DIET CARB CONTROL;     Intake/Output Summary (Last 24 hours) at 09/17/18 0710  Last data filed at 09/16/18 2308   Gross per 24 hour   Intake             1400 ml   Output             1300 ml   Net              100 ml     Medications:      dextrose        gabapentin  100 mg Oral BID    cilostazol  100 mg Oral BID    insulin glargine  18 Units Subcutaneous Nightly    amLODIPine  10 mg Oral Daily    bumetanide  1 mg Oral Daily    cefTRIAXone (ROCEPHIN) IV  1 g Intravenous Q24H    sodium chloride flush  10 mL Intravenous 2 times per day    enoxaparin  30 mg Subcutaneous QPM    insulin lispro  0-12 Units Subcutaneous TID WC    insulin lispro  0-6 Units Subcutaneous Nightly    atenolol  50 mg Oral Daily    clopidogrel  75 mg Oral Daily    pantoprazole  40 mg Oral BID AC    meclizine  25 mg Oral Daily     Recent Labs      09/15/18   0141  09/16/18   0148  09/17/18   0210   WBC  6.1  6.2  6.1   HGB  9.0*  9.9*  9.1*   PLT  246  257  233     Recent Labs      09/15/18   0141  09/16/18   0148  09/17/18   0210   NA  141  139  138   K  4.6  4.4  4.5   CL  106  105  103   CO2  23  23  23   BUN  39*  42*  44*   CREATININE  2.3*  2.3*  2.4*   GLUCOSE  122*  109  204*     Recent Labs      09/15/18   0141  09/16/18   0148  09/17/18   0210   AST  11  12  11   ALT  8  8  7   BILITOT  <0.2  <0.2  <0.2   ALKPHOS  42  43  41       Objective:   Vitals: /64   Pulse 59   Temp 98.3 °F (36.8 °C) (Temporal)   Resp 20   Ht 5' 4\" (1.626 m)   Wt 118 lb (53.5 kg)   LMP 03/15/1970 (Approximate)   SpO2 93%   BMI 20.25 kg/m²   General appearance: alert and cooperative with exam  Lungs: Bibasilar rales  Heart: regular rate and rhythm, S1, S2 normal, no murmur, click, rub or gallop  Abdomen: soft, non-tender; bowel sounds normal; no masses,  no organomegaly  Extremities: Decreased pulses  Neurologic: No

## 2018-09-17 NOTE — PROGRESS NOTES
Marlene Thayer RVT                                                         Gaston Giles RVT  Procedure Type of Study:   Extremities Arteries:Lower Arterial Plethysmography, VASC LOWER EXTREMITY  ART SEGMENTAL PRESSURES W PPG. Indications for Study:Claudication. Risk Factors   - The patient's risk factor(s) include: diabetes mellitus and arterial     hypertension.   - The patient has a current/recent (within 1 year) tobacco history. Technical Quality:Limited visualization due to patient unable to relax legs. Impression   The patient has severely diminished ankle-brachial indices bilateral lower  extremity(ies) which would be consistent with rest pain symptoms. Based on segmental pressures and doppler waveforms, the patient likely has  disease in the bilateral superficial femoral, popliteal, tibial arterial  segments. Signature   ----------------------------------------------------------------  Electronically signed by Angelica Carbajal MD(Interpreting  physician) on 09/14/2018 04:57 PM  ----------------------------------------------------------------  Velocities are measured in cm/s ; Diameters are measured in mm Pressures +--------------------------------------++--------+-----+----+--------+-----+ ! ! !Right   ! ! Left!        !     ! +--------------------------------------++--------+-----+----+--------+-----+ ! Location                              ! !Pressure! Ratio! !Pressure! Ratio! +--------------------------------------++--------+-----+----+--------+-----+ ! Upper Thigh                           !!107     !0.61 !    !145     ! 0.82 ! +--------------------------------------++--------+-----+----+--------+-----+ ! Lower Thigh                           !!59      !0.34 !    !80      !0.45 ! +--------------------------------------++--------+-----+----+--------+-----+ ! Calf                                  !!77      !0.44 !    !62      !0.35 ! +--------------------------------------++--------+-----+----+--------+-----+ ! Ankle PT                              !!52      !0.3  ! !62      !0.35 ! +--------------------------------------++--------+-----+----+--------+-----+ ! DP                                    !!49      !0.28 !    !61      !0.35 ! +--------------------------------------++--------+-----+----+--------+-----+ ! Great Toe                             !!29      !0.16 !    !45      !0.26 ! +--------------------------------------++--------+-----+----+--------+-----+   - Brachial Pressure:Right: 176. Left:172.   - SAMMIE:Right: 0. 3. Left: 0.35. Plethysmographic Digit Evaluation +---------++--------+-----+---------------++--------+-----+----------------+ ! ! !Right   ! ! Left           !!        !     !                ! +---------++--------+-----+---------------++--------+-----+----------------+ ! Location ! !Pressure! Ratio! PPG Wave Form  ! !Pressure! Ratio! PPG Wave Form   ! +---------++--------+-----+---------------++--------+-----+----------------+ ! Great Toe!!29      !0.16 !               !!45      !0.26 !                ! +---------++--------+-----+---------------++--------+-----+----------------+      Assessment:  Patient Active Problem List    Diagnosis Date Noted    Hypoglycemia 09/13/2018     Priority: Low    Skin ulcer of second toe of left foot with fat layer exposed ([facility] 08/09/2016     Priority: Low    Nonhealing ulcer of right lower leg limited to breakdown of skin ([facility] 08/02/2016     Priority: Low    Diabetic ulcer of left foot associated with diabetes mellitus due to underlying condition ([facility] 08/02/2016     Priority: Low    Atherosclerosis of native artery of both lower extremities with intermittent claudication (HCC)      Priority: Low    CVA (cerebral vascular accident) ([facility] 03/13/2016     Priority: Low    Carotid bruit 03/13/2016     Priority: Low    Ataxia      Priority: Low    Superior mesenteric artery stenosis ([facility]

## 2018-09-18 NOTE — PROGRESS NOTES
+------------+------+-------+--------+-------+------------+----------------+ ! Vertebral   !135   !17.3   ! 60      !0.87   !            !                ! +------------+------+-------+--------+-------+------------+----------------+   - There is antegrade vertebral flow noted on the left side. - Additional Measurements:ICAPSV/CCAPSV 1.12. ICAEDV/CCAEDV 2.39. Vl Lower Extremity Arterial Segmental Pressures W Ppg    Result Date: 9/14/2018  Vascular Lower Arterial Plethysmography Procedure  Demographics   Patient Name     Alice Sauceda Age                    69   Patient Number   [id number]         Gender                 Female   Visit Number     [id number]      Interpreting Physician Angelica Carbajal MD   Date of Birth    1940     Referring Physician    MD Camilla Mckenzie   Accession Number [id number]      [address] [place] [address] Ames RVT  Procedure Type of Study:   Extremities Arteries:Lower Arterial Plethysmography, VASC LOWER EXTREMITY  ART SEGMENTAL PRESSURES W PPG. Indications for Study:Claudication. Risk Factors   - The patient's risk factor(s) include: diabetes mellitus and arterial     hypertension.   - The patient has a current/recent (within 1 year) tobacco history. Technical Quality:Limited visualization due to patient unable to relax legs. Impression   The patient has severely diminished ankle-brachial indices bilateral lower  extremity(ies) which would be consistent with rest pain symptoms. Based on segmental pressures and doppler waveforms, the patient likely has  disease in the bilateral superficial femoral, popliteal, tibial arterial  segments.    Signature   ----------------------------------------------------------------  Electronically signed by Angelica Carbajal MD(Interpreting  physician) on 09/14/2018 04:57 PM  ----------------------------------------------------------------  Velocities are measured in cm/s ; Diameters are measured in mm Pressures +--------------------------------------++--------+-----+----+--------+-----+ ! ! !Right   ! ! Left!        !     ! +--------------------------------------++--------+-----+----+--------+-----+ ! Location                              ! !Pressure! Ratio! !Pressure! Ratio! +--------------------------------------++--------+-----+----+--------+-----+ ! Upper Thigh                           !!107     !0.61 !    !145     ! 0.82 ! +--------------------------------------++--------+-----+----+--------+-----+ ! Lower Thigh                           !!59      !0.34 !    !80      !0.45 ! +--------------------------------------++--------+-----+----+--------+-----+ ! Calf                                  !!77      !0.44 !    !62      !0.35 ! +--------------------------------------++--------+-----+----+--------+-----+ ! Ankle PT                              !!52      !0.3  ! !62      !0.35 ! +--------------------------------------++--------+-----+----+--------+-----+ ! DP                                    !!49      !0.28 !    !61      !0.35 ! +--------------------------------------++--------+-----+----+--------+-----+ ! Great Toe                             !!29      !0.16 !    !45      !0.26 ! +--------------------------------------++--------+-----+----+--------+-----+   - Brachial Pressure:Right: 176. Left:172.   - SAMMIE:Right: 0. 3. Left: 0.35. Plethysmographic Digit Evaluation +---------++--------+-----+---------------++--------+-----+----------------+ ! ! !Right   ! ! Left           !!        !     !                ! +---------++--------+-----+---------------++--------+-----+----------------+ ! Location ! !Pressure! Ratio! PPG Wave Form  ! !Pressure! Ratio! PPG Wave Form   ! +---------++--------+-----+---------------++--------+-----+----------------+ ! Great Toe!!29      !0.16 !               !!45      !0.26 !                ! +---------++--------+-----+---------------++--------+-----+----------------+      Assessment:  Patient Active Problem List    Diagnosis Date Noted    Hypoglycemia 09/13/2018     Priority: Low    Skin ulcer of second toe of left foot with fat layer exposed [facility] 08/09/2016     Priority: Low    Nonhealing ulcer of right lower leg limited to breakdown of skin ([facility] 08/02/2016     Priority: Low    Diabetic ulcer of left foot associated with diabetes mellitus due to underlying condition ([facility] 08/02/2016     Priority: Low    Atherosclerosis of native artery of both lower extremities with intermittent claudication (HCC)      Priority: Low    CVA (cerebral vascular accident) ([facility] 03/13/2016     Priority: Low    Carotid bruit 03/13/2016     Priority: Low    Ataxia      Priority: Low    Superior mesenteric artery stenosis ([facility] 09/23/2014     Priority: Low    Atherosclerosis of native artery of extremity with intermittent claudication ([facility] 09/22/2014     Priority: Low    Diabetes mellitus ([facility]      Priority: Low    Diabetic vasculopathy ([facility]      Priority: Low    Hypertension      Priority: Low       Plan:  1. Continue current treatment  2.  Arrange f/u outpt chf clinic suggest change atenolol to coreg    Jonathan Bailey MD 9/18/2018 9:04 AM

## 2018-09-18 NOTE — PROGRESS NOTES
Discussed with patient re: diet, activity, medications, definition of heart failure and s/s, daily wt monitoring with reporting of wt gain of >2-3 lbs in 24 hours or > 5 lbs in one week, BP/HR and activity monitoring with use of daily log, f/u appointments with PCP, CHF clinic and cardiologist.Total time spent with patient by CHF nurse 40  minutes. Additional time will be spent with the patient each day of her hospital stay. NP will see patient in the clinic setting. Patient stated she takes care of disabled daughter and is planning to return home upon discharge. Patient stated she has a BP monitor and plans to buy wt scales when she returns home. Patient stated she has never been on a low salt diet. Patient stated she uses a walker for ambulation and utilizes motorized chair when shopping. Patient stated she does not drive.

## 2018-09-18 NOTE — PROGRESS NOTES
Nephrology ([facility]) Progress Note    Patient:  Sadie Dunn  YOB: 1940  Date of Service: 9/18/2018  MRN: [id number]   Acct: [de-identified]   Primary Care Physician: Jose Cortes MD  Advance Directive: Full Code  Admit Date: 9/13/2018       Hospital Day: 5  Referring Provider: Jose Cortes MD    Patient independently seen and examined, Chart, Consults, Notes, Operative notes, Labs, Cardiology, and Radiology studies reviewed as able. Subjective:  Sadie Dunn is a 69 y.o. female  whom we were consulted for acute kidney injury/chronic kidney disease. She has stage IV chronic kidney disease and follows Dr. Josias Bravo in the office. Presented with profound hypoglycemia and weakness. She was disoriented she was treated with D50 and her medications were adjusted. She also suffered an episode of acute kidney injury which is getting better.     Patient is feeling much better and feels that breathing is better. No n/v.   Ambulating some.         Allergies:  Aspirin and Codeine    Medicines:  Current Facility-Administered Medications   Medication Dose Route Frequency Provider Last Rate Last Dose    gabapentin (NEURONTIN) capsule 100 mg  100 mg Oral Daily Jose Cortes MD   100 mg at 09/18/18 0844    gabapentin (NEURONTIN) capsule 300 mg  300 mg Oral Nightly Jose Cortes MD        carvedilol (COREG) tablet 6.25 mg  6.25 mg Oral BID  Kristofer Buchanan MD        insulin glargine (LANTUS) injection vial 20 Units  20 Units Subcutaneous Nightly Jose Cortes MD   20 Units at 09/17/18 1929    cephALEXin (KEFLEX) capsule 250 mg  250 mg Oral 2 times per day Jose Cortes MD   250 mg at 09/18/18 0844    cilostazol (PLETAL) tablet 100 mg  100 mg Oral BID Jose Cortes MD   100 mg at 09/18/18 0843    amLODIPine (NORVASC) tablet 10 mg  10 mg Oral Daily Zane Cooney MD   10 mg at 09/18/18 0843    bumetanide (BUMEX) tablet 1 mg  1 mg Oral Daily Zane Cooney MD   1 mg at 09/18/18 0843    sodium chloride flush 0.9 % injection 10 mL  10 mL Intravenous 2 times per day Newton Stafford MD   10 mL at 09/17/18 0946    sodium chloride flush 0.9 % injection 10 mL  10 mL Intravenous PRN Newton Stafford MD        acetaminophen (TYLENOL) tablet 650 mg  650 mg Oral Q4H PRN Newton Stafford MD   650 mg at 09/14/18 1551    enoxaparin (LOVENOX) injection 30 mg  30 mg Subcutaneous QPM Newton Stafford MD   30 mg at 09/17/18 1813    insulin lispro (HUMALOG) injection vial 0-12 Units  0-12 Units Subcutaneous TID WC Newton Stafford MD   2 Units at 09/17/18 1814    insulin lispro (HUMALOG) injection vial 0-6 Units  0-6 Units Subcutaneous Nightly Newton Stafford MD   2 Units at 09/14/18 2203    glucose (GLUTOSE) 40 % oral gel 15 g  15 g Oral PRN Newton Stafford MD        dextrose 50 % solution 12.5 g  12.5 g Intravenous PRN Newton Stafford MD        glucagon (rDNA) injection 1 mg  1 mg Intramuscular PRN Newton Stafford MD        dextrose 5 % solution  100 mL/hr Intravenous PRN Newton Stafford MD        clopidogrel (PLAVIX) tablet 75 mg  75 mg Oral Daily Newton Stafford MD   75 mg at 09/18/18 0843    HYDROcodone-acetaminophen ([address]) 7.5-325 MG per tablet 1 tablet  1 tablet Oral Q6H PRN Newton Stafford MD   1 tablet at 09/18/18 0845    pantoprazole (PROTONIX) tablet 40 mg  40 mg Oral BID AC Newton Stafford MD   40 mg at 09/18/18 8039    meclizine (ANTIVERT) tablet 25 mg  25 mg Oral Daily Newton tSafford MD   25 mg at 09/18/18 4645    cloNIDine (CATAPRES) tablet 0.1 mg  0.1 mg Oral Q4H PRN Newton Stafford MD           Past Medical History:  Past Medical History:   Diagnosis Date    Arthritis     Atherosclerosis of native arteries of the extremities with intermittent claudication 9/22/2014    [facility]erebral artery occlusion with [facility] [address]     Diabetes mellitus ([facility] [address] [facility]     Diabetic vasculopathy ([facility] [address] [facility]     Hypertension     Kidney stone     Peripheral vascular breastfeeding. Intake/Output Summary (Last 24 hours) at 09/18/18 1119  Last data filed at 09/18/18 0056   Gross per 24 hour   Intake              840 ml   Output              320 ml   Net              520 ml     General: awake/alert   Chest:  Decreased bs bilat, few crackles  CVS: regular rate and rhythm  Abdominal: soft, nontender, normal bowel sounds  Extremities: no cyanosis or edema  Skin: warm and dry without rash    Labs:  BMP:   Recent Labs      09/16/18 0148 09/17/18 0210 09/18/18   0040   NA  139  138  137   K  4.4  4.5  4.7   CL  105  103  102   CO2  23  23  22   PHOS   --    --   4.9*   BUN  42*  44*  51*   CREATININE  2.3*  2.4*  2.7*   CALCIUM  8.2*  8.2*  8.5*     CBC:   Recent Labs      09/16/18 0148 09/17/18 0210 09/18/18   0741   WBC  6.2  6.1  5.7   HGB  9.9*  9.1*  10.2*   HCT  32.8*  29.4*  33.0*   MCV  96.5  95.1  94.6   PLT  257  233  260     LIVER PROFILE:   Recent Labs      09/16/18 0148 09/17/18 0210   AST  12  11   ALT  8  7   BILITOT  <0.2  <0.2   ALKPHOS  43  41     PT/INR: No results for input(s): PROTIME, INR in the last 72 hours. APTT: No results for input(s): APTT in the last 72 hours. BNP:  No results for input(s): BNP in the last 72 hours. Ionized Calcium:No results for input(s): IONCA in the last 72 hours. Magnesium:  Recent Labs      09/18/18   0040   MG  1.5*     Phosphorus:  Recent Labs      09/18/18   0040   PHOS  4.9*     HgbA1C: No results for input(s): LABA1C in the last 72 hours. Hepatic:   Recent Labs      09/16/18 0148 09/17/18   0210   ALKPHOS  43  41   ALT  8  7   AST  12  11   PROT  5.8*  5.4*   BILITOT  <0.2  <0.2   LABALBU  3.3*  3.3*     Lactic Acid: No results for input(s): LACTA in the last 72 hours. Troponin: No results for input(s): CKTOTAL, CKMB, TROPONINT in the last 72 hours. ABGs: No results found for: PHART, PO2ART, VKR0WCV  CRP:  No results for input(s): CRP in the last 72 hours.   Sed Rate:  No results for input(s): SEDRATE in the last 72 hours. Culture Results:   Blood Culture Recent: No results for input(s): BC in the last 720 hours. Urine Culture Recent :   Recent Labs      09/13/18   1248   LABURIN  >100,000 CFU/ml  Mixed skin tanika present  *  Moderate growth       Radiology reports as per the Radiologist  Radiology: Ct Head Wo Contrast    Result Date: 9/13/2018  Examination. CT HEAD WO CONTRAST History: Slurred speech. DLP: 17 78 mGycm . The CT scan of the head is performed without intravenous contrast enhancement. The images are acquired in axial plane with subsequent reconstruction in coronal and sagittal planes. The comparison is made with the previous study dated 3/13/2016. This studies are limited diagnostic value due to motion and imaging artifacts. There is no evidence of a mass or midline shift. There is no evidence of intracranial hemorrhage or hematoma. Moderately dilated ventricles, basal cisterns cortical sulci are noted suggesting chronic volume loss/atrophy. There are scattered areas of chronic white matter ischemia. There is normal gray-white matter differentiation. The images reviewed in bone window show no acute bony abnormality. The visualized paranasal sinuses and mastoid air cells are normal.    No acute intracranial abnormality. Chronic ischemic and atrophic changes. This study may not rule out an acute nonhemorrhagic infarction. If clinically warranted please obtain MR imaging of the brain. The above findings are recorded on a digital voice clip in PACS. Signed by Dr Sheryl Corcoran on 9/13/2018 1:47 PM    Xr Chest Portable    Result Date: 9/13/2018  XR CHEST PORTABLE 9/13/2018 11:15 AM HISTORY: Altered mental status COMPARISON: 9/25/2016. FINDINGS: New volume loss in the left lung base, likely secondary to lower lobe atelectasis. Coarsened interstitial markings identified in both lungs. Cardiomegaly is identified and there is appear to be enlargement of the central pulmonary vessels.  No pleural effusion or pneumothorax. The osseous structures and surrounding soft tissues demonstrate no acute abnormality. 1. Likely atelectasis in the left lung base. 2. Cardiomegaly with pulmonary congestion. Likely interstitial edema. Signed by Dr Jolanta Rae on 9/13/2018 12:39 PM    Vl Dup Carotid Bilateral    Result Date: 9/14/2018  Vascular Carotid Procedure  Demographics   Patient Name     Sherie Rowell Age                    69   Patient Number   [id number]         Gender                 Female   Visit Number     [id number]      Interpreting Physician Arnold Miranda MD   Date of Birth    1940     Referring Physician    Ran Mina MD   Accession Number [id number]      Sonographer            Tino Vang RVT  Procedure Type of Study:   Cerebral:Carotid, VL CAROTID BILATERAL. Indications for Study:Bruit. Risk Factors   - The patient's risk factor(s) include: diabetes mellitus and arterial     hypertension.   - The patient has a current/recent (within 1 year) tobacco history. - The patient's risk factor(s) include: Prior CVA, ,  Impression   There is heterogeneous plaque visualized in the bilateral internal carotid  artery(ies). There is 50-69% stenosis in the right internal carotid artery. There is 50-69% stenosis of the left internal carotid artery. There is normal antegrade flow in the bilateral vertebral artery(ies). Signature   ----------------------------------------------------------------  Electronically signed by Arnold Miranda MD(Interpreting  physician) on 09/14/2018 04:50 PM  ----------------------------------------------------------------  Velocities are measured in cm/s ; Diameters are measured in mm Carotid Right Measurements +------------+------+-------+--------+-------+------------+----------------+ ! Location    ! PSV   ! EDV    ! Angle   ! RI     !%Stenosis   ! Tortuosity      ! +------------+------+-------+--------+-------+------------+----------------+ ! Prox CCA    !137   !18.3   ! 60      !0.87   ! !                ! +------------+------+-------+--------+-------+------------+----------------+ ! Mid CCA     !143   !21     !60      !0.85   !            !                ! +------------+------+-------+--------+-------+------------+----------------+ ! Prox ICA    !120   !10.8   !60      !0.91   !            !                ! +------------+------+-------+--------+-------+------------+----------------+ ! Mid ICA     !165   !29.5   !60      !0.82   !            !                ! +------------+------+-------+--------+-------+------------+----------------+ ! Dist ICA    !107   !25.5   !60      !0.76   !            !                ! +------------+------+-------+--------+-------+------------+----------------+ ! Prox ECA    !196   !15.7   !60      !0.92   !            !                ! +------------+------+-------+--------+-------+------------+----------------+ ! Vertebral   !124   !22.6   !60      !0.82   !            !                ! +------------+------+-------+--------+-------+------------+----------------+   - There is antegrade vertebral flow noted on the right side. - Additional Measurements:ICAPSV/CCAPSV 1.2. ICAEDV/CCAEDV 1.61. Carotid Left Measurements +------------+------+-------+--------+-------+------------+----------------+ ! Location    ! PSV   ! EDV    ! Angle   ! RI     !%Stenosis   ! Tortuosity      ! +------------+------+-------+--------+-------+------------+----------------+ ! Prox CCA    !150   !19.6   !60      !0.87   !            !                ! +------------+------+-------+--------+-------+------------+----------------+ ! Mid CCA     !183   !23.6   ! 60      !0.87   !            !                ! +------------+------+-------+--------+-------+------------+----------------+ ! Prox ICA    ! 168   !46.8   !60      !0.72   !            !                ! +------------+------+-------+--------+-------+------------+----------------+ ! Mid ICA     !140   !29.9   !60      !0.79   !            !

## 2018-09-18 NOTE — PROGRESS NOTES
Progress Note  9/18/2018 6:37 AM  Subjective:   Admit Date: 9/13/2018  PCP: Claudio Carroll MD    Interval History: Pain is worse. DIET CARB CONTROL; Intake/Output Summary (Last 24 hours) at 09/18/18 0637  Last data filed at 09/18/18 0056   Gross per 24 hour   Intake             1080 ml   Output              720 ml   Net              360 ml     Medications:      dextrose        insulin glargine  20 Units Subcutaneous Nightly    cephALEXin  250 mg Oral 2 times per day    gabapentin  100 mg Oral BID    cilostazol  100 mg Oral BID    amLODIPine  10 mg Oral Daily    bumetanide  1 mg Oral Daily    sodium chloride flush  10 mL Intravenous 2 times per day    enoxaparin  30 mg Subcutaneous QPM    insulin lispro  0-12 Units Subcutaneous TID WC    insulin lispro  0-6 Units Subcutaneous Nightly    atenolol  50 mg Oral Daily    clopidogrel  75 mg Oral Daily    pantoprazole  40 mg Oral BID AC    meclizine  25 mg Oral Daily     Recent Labs      09/16/18   0148  09/17/18   0210   WBC  6.2  6.1   HGB  9.9*  9.1*   PLT  257  233     Recent Labs      09/16/18   0148  09/17/18   0210  09/18/18   0040   NA  139  138  137   K  4.4  4.5  4.7   CL  105  103  102   CO2  23  23  22   BUN  42*  44*  51*   CREATININE  2.3*  2.4*  2.7*   GLUCOSE  109  204*  117*     Recent Labs      09/16/18   0148  09/17/18   0210   AST  12  11   ALT  8  7   BILITOT  <0.2  <0.2   ALKPHOS  43  41       Objective:   Vitals: /66   Pulse 66   Temp 98.4 °F (36.9 °C) (Temporal)   Resp 18   Ht 5' 4\" (1.626 m)   Wt 118 lb (53.5 kg)   LMP 03/15/1970 (Approximate)   SpO2 96%   BMI 20.25 kg/m²   General appearance: alert and cooperative with exam  Lungs: Bibasilar rales  Heart: regular rate and rhythm, S1, S2 normal, no murmur, click, rub or gallop  Abdomen: soft, non-tender; bowel sounds normal; no masses,  no organomegaly  Extremities: Decreased pulses  Neurologic: No obvious focal neurologic deficits.     Assessment and Plan:

## 2018-09-19 NOTE — PROGRESS NOTES
 Atherosclerosis of native arteries of the extremities with intermittent claudication 9/22/2014    [facility]bral [facility] [address]     Diabetes mellitus ([facility] [address] [facility]     Diabetic vasculopathy ([facility] [address]     Hypertension     Kidney stone     Peripheral vascular disease ([facility] [address]     Superior mesenteric artery stenosis ([facility] [address] 9/23/2014       Past Surgical History:  Past Surgical History:   Procedure Laterality Date    APPENDECTOMY      CHOLECYSTECTOMY      COLONOSCOPY      FINGER TRIGGER RELEASE Right     HYSTERECTOMY  40 YEARS PRIOR    PARTIAL    KNEE SURGERY Right     TONSILLECTOMY         Family History  Family History   Problem Relation Age of Onset    Hypertension Father     Diabetes Father     Stroke Mother     Cancer Mother     Diabetes Mother     Seizures Child        Social History  Social History     Social History    Marital status:      Spouse name: N/A    Number of children: 4    Years of education: 12     Occupational History    Not on file.      Social History Main Topics    Smoking status: Current Every Day Smoker     Packs/day: 0.50     Last attempt to quit: 3/10/2016    Smokeless tobacco: Current User    Alcohol use No    Drug use: No    Sexual activity: Not on file     Other Topics Concern    Not on file     Social History Narrative    Born [place]     8 yrs ago     once    Has 2 sons and 2 daughters    Worked at [employer] 27 yrs     Education [address] 1/2 [address]d    Denies alcohol consumption or substance usage    Physically sedentary    Quit driving 8/5/6478    Walks with walker         Review of Systems:  History obtained from chart review and the patient  General ROS: No fever or chills  Respiratory ROS: No cough, +shortness of breath  Cardiovascular ROS: No chest pain or palpitations  Gastrointestinal ROS: No abdominal pain or melena  Genito-Urinary ROS: No dysuria or hematuria  Musculoskeletal +------------+------+-------+--------+-------+------------+----------------+ ! Prox ICA    ! 168   !46.8   !60      !0.72   !            !                ! +------------+------+-------+--------+-------+------------+----------------+ ! Mid ICA     !140   !29.9   !60      !0.79   !            !                ! +------------+------+-------+--------+-------+------------+----------------+ ! Dist ICA    !124   !33.7   !56      !0.73   !            !                ! +------------+------+-------+--------+-------+------------+----------------+ ! Prox ECA    !210   !       !61      !       !            !                ! +------------+------+-------+--------+-------+------------+----------------+ ! Vertebral   !135   !17.3   ! 60      !0.87   !            !                ! +------------+------+-------+--------+-------+------------+----------------+   - There is antegrade vertebral flow noted on the left side. - Additional Measurements:ICAPSV/CCAPSV 1.12. ICAEDV/CCAEDV 2.39. Vl Lower Extremity Arterial Segmental Pressures W Ppg    Result Date: 9/14/2018  Vascular Lower Arterial Plethysmography Procedure  Demographics   Patient Name     Theodora Gomez Age                    69   Patient Number   [id number]         Gender                 Female   Visit Number     [id number]      Interpreting Physician Rodrigo Fung MD   Date of Birth    1940     Referring Physician    MD Jasper Rojo   Accession Number [id number]      [address] [place] [address]  Procedure Type of Study:   Extremities Arteries:Lower Arterial Plethysmography, VASC LOWER EXTREMITY  ART SEGMENTAL PRESSURES W PPG. Indications for Study:Claudication. Risk Factors   - The patient's risk factor(s) include: diabetes mellitus and arterial     hypertension.   - The patient has a current/recent (within 1 year) tobacco history.  Technical Quality:Limited visualization due to patient unable to relax legs. Impression   The patient has severely diminished ankle-brachial indices bilateral lower  extremity(ies) which would be consistent with rest pain symptoms. Based on segmental pressures and doppler waveforms, the patient likely has  disease in the bilateral superficial femoral, popliteal, tibial arterial  segments. Signature   ----------------------------------------------------------------  Electronically signed by Luke Grullon MD(Interpreting  physician) on 09/14/2018 04:57 PM  ----------------------------------------------------------------  Velocities are measured in cm/s ; Diameters are measured in mm Pressures +--------------------------------------++--------+-----+----+--------+-----+ ! ! !Right   ! ! Left!        !     ! +--------------------------------------++--------+-----+----+--------+-----+ ! Location                              ! !Pressure! Ratio! !Pressure! Ratio! +--------------------------------------++--------+-----+----+--------+-----+ ! Upper Thigh                           !!107     !0.61 !    !145     ! 0.82 ! +--------------------------------------++--------+-----+----+--------+-----+ ! Lower Thigh                           !!59      !0.34 !    !80      !0.45 ! +--------------------------------------++--------+-----+----+--------+-----+ ! Calf                                  !!77      !0.44 !    !62      !0.35 ! +--------------------------------------++--------+-----+----+--------+-----+ ! Ankle PT                              !!52      !0.3  ! !62      !0.35 ! +--------------------------------------++--------+-----+----+--------+-----+ ! DP                                    !!49      !0.28 !    !61      !0.35 ! +--------------------------------------++--------+-----+----+--------+-----+ ! Great Toe                             !!29      !0.16 !    !45      !0.26 !

## 2018-09-19 NOTE — PLAN OF CARE
Problem: HH FLUID RETENTION-CHF  Goal: Risk for excess fluid volume will decrease  Risk for excess fluid volume will decrease     Outcome: Ongoing    Goal: Control of fluid volume excess will improve  Control of fluid volume excess will improve     Outcome: Ongoing      Problem: Mobility - Impaired:  Goal: Mobility will improve  Mobility will improve   Outcome: Ongoing

## 2018-09-19 NOTE — PLAN OF CARE
Problem: Falls - Risk of:  Goal: Will remain free from falls  Will remain free from falls   Outcome: Ongoing    Goal: Absence of physical injury  Absence of physical injury   Outcome: Ongoing      Problem: Pain:  Goal: Pain level will decrease  Pain level will decrease   Outcome: Ongoing    Goal: Control of acute pain  Control of acute pain   Outcome: Ongoing    Goal: Control of chronic pain  Control of chronic pain   Outcome: Ongoing      Problem: HH FLUID RETENTION-CHF  Goal: Risk for excess fluid volume will decrease  Risk for excess fluid volume will decrease     Outcome: Ongoing    Goal: Control of fluid volume excess will improve  Control of fluid volume excess will improve     Outcome: Ongoing      Problem: Mobility - Impaired:  Goal: Mobility will improve  Mobility will improve   Outcome: Ongoing

## 2018-09-19 NOTE — DISCHARGE SUMMARY
units  nightly. We stopped the Januvia because of the renal problems. We will  continue the meclizine 25 mg three times a day as needed. We stopped the  lisinopril because of the renal problem. We stopped atenolol and replaced  it with carvedilol 6.25 mg twice daily. We have added amlodipine 10 mg  daily and we have given her cephalexin 250 twice daily for UTI and that  will be an additional 7 days of antibiotics and she will complete that. We  are going to replace that with Bumex 1 mg daily and stop the Lasix at this  point. She will continue the Pletal, but we increased that from 50 to 100  mg twice a day. Protonix 40 mg twice a day and we started her on Niferex  150 daily and allopurinol 100 mg daily. She will see me in 5 to 7 days. The others at their discretion.         Sy Murray MD    D: 09/19/2018 8:34:34      T: 09/19/2018 8:36:24     [email or web address]  Job#: [id number]     Doc#: [id number]    CC:

## 2018-09-19 NOTE — PROGRESS NOTES
Progress Note  9/19/2018 6:48 AM  Subjective:   Admit Date: 9/13/2018  PCP: Laura Vernon MD    Interval History: Pain is worse. DIET CARB CONTROL;     Intake/Output Summary (Last 24 hours) at 09/19/18 0648  Last data filed at 09/19/18 0044   Gross per 24 hour   Intake              880 ml   Output              500 ml   Net              380 ml     Medications:      dextrose        gabapentin  100 mg Oral Daily    gabapentin  300 mg Oral Nightly    carvedilol  6.25 mg Oral BID WC    allopurinol  100 mg Oral Daily    iron polysaccharides  150 mg Oral Daily    insulin glargine  20 Units Subcutaneous Nightly    cephALEXin  250 mg Oral 2 times per day    cilostazol  100 mg Oral BID    amLODIPine  10 mg Oral Daily    bumetanide  1 mg Oral Daily    sodium chloride flush  10 mL Intravenous 2 times per day    enoxaparin  30 mg Subcutaneous QPM    insulin lispro  0-12 Units Subcutaneous TID WC    insulin lispro  0-6 Units Subcutaneous Nightly    clopidogrel  75 mg Oral Daily    pantoprazole  40 mg Oral BID AC    meclizine  25 mg Oral Daily     Recent Labs      09/17/18   0210  09/18/18   0741  09/19/18   0149   WBC  6.1  5.7  6.2   HGB  9.1*  10.2*  9.1*   PLT  233  260  245     Recent Labs      09/17/18   0210  09/18/18   0040  09/19/18   0149   NA  138  137  140   K  4.5  4.7  5.1*   CL  103  102  105   CO2  23  22  23   BUN  44*  51*  50*   CREATININE  2.4*  2.7*  2.5*   GLUCOSE  204*  117*  170*     Recent Labs      09/17/18   0210   AST  11   ALT  7   BILITOT  <0.2   ALKPHOS  41       Objective:   Vitals: /60   Pulse 67   Temp 97.8 °F (36.6 °C) (Temporal)   Resp 20   Ht 5' 4\" (1.626 m)   Wt 118 lb (53.5 kg)   LMP 03/15/1970 (Approximate)   SpO2 96%   BMI 20.25 kg/m²   General appearance: alert and cooperative with exam  Lungs: Bibasilar rales  Heart: regular rate and rhythm, S1, S2 normal, no murmur, click, rub or gallop  Abdomen: soft, non-tender; bowel sounds normal; no masses,  no

## 2018-09-19 NOTE — PROGRESS NOTES
polysaccharides  150 mg Oral Daily    insulin glargine  20 Units Subcutaneous Nightly    cephALEXin  250 mg Oral 2 times per day    cilostazol  100 mg Oral BID    amLODIPine  10 mg Oral Daily    bumetanide  1 mg Oral Daily    sodium chloride flush  10 mL Intravenous 2 times per day    enoxaparin  30 mg Subcutaneous QPM    insulin lispro  0-12 Units Subcutaneous TID WC    insulin lispro  0-6 Units Subcutaneous Nightly    clopidogrel  75 mg Oral Daily    pantoprazole  40 mg Oral BID AC    meclizine  25 mg Oral Daily       TELEMETRY: Sinus     Physical Exam:    [unfilled]    Physical Exam      General:  Awake, alert, NAD  Skin:  Warm and dry  Neck:  no jvd , carotid bruits present  Chest:  Clear to auscultation, no wheezing or rales  Cardiovascular:  RRR S5W7 no murmurs, clicks, gallups, or rubs  Abdomen:  Soft nontender, nondistended, bowel sounds present  Extremities:  no edema     Lab Data:  CBC: Recent Labs      09/17/18   0210  09/18/18   0741  09/19/18   0149   WBC  6.1  5.7  6.2   HGB  9.1*  10.2*  9.1*   HCT  29.4*  33.0*  29.3*   MCV  95.1  94.6  95.1   PLT  233  260  245     BMP: Recent Labs      09/17/18   0210  09/18/18   0040  09/19/18   0149   NA  138  137  140   K  4.5  4.7  5.1*   CL  103  102  105   CO2  23  22  23   PHOS   --   4.9*   --    BUN  44*  51*  50*   CREATININE  2.4*  2.7*  2.5*     LIVER PROFILE:   Recent Labs      09/17/18 0210   AST  11   ALT  7   BILITOT  <0.2   ALKPHOS  41     PT/INR: No results for input(s): PROTIME, INR in the last 72 hours. APTT: No results for input(s): APTT in the last 72 hours. BNP:  No results for input(s): BNP in the last 72 hours. CK, CKMB, Troponin: @LABRCNT (CKTOTAL:3, CKMB:3, TROPONINI:3)@    IMAGING:  Ct Head Wo Contrast    Result Date: 9/13/2018  Examination. CT HEAD WO CONTRAST History: Slurred speech. DLP: 17 78 mGycm . The CT scan of the head is performed without intravenous contrast enhancement.  The images are acquired in axial plane with subsequent reconstruction in coronal and sagittal planes. The comparison is made with the previous study dated 3/13/2016. This studies are limited diagnostic value due to motion and imaging artifacts. There is no evidence of a mass or midline shift. There is no evidence of intracranial hemorrhage or hematoma. Moderately dilated ventricles, basal cisterns cortical sulci are noted suggesting chronic volume loss/atrophy. There are scattered areas of chronic white matter ischemia. There is normal gray-white matter differentiation. The images reviewed in bone window show no acute bony abnormality. The visualized paranasal sinuses and mastoid air cells are normal.    No acute intracranial abnormality. Chronic ischemic and atrophic changes. This study may not rule out an acute nonhemorrhagic infarction. If clinically warranted please obtain MR imaging of the brain. The above findings are recorded on a digital voice clip in PACS. Signed by Dr Opal Lewis on 9/13/2018 1:47 PM    Xr Chest Portable    Result Date: 9/13/2018  XR CHEST PORTABLE 9/13/2018 11:15 AM HISTORY: Altered mental status COMPARISON: 9/25/2016. FINDINGS: New volume loss in the left lung base, likely secondary to lower lobe atelectasis. Coarsened interstitial markings identified in both lungs. Cardiomegaly is identified and there is appear to be enlargement of the central pulmonary vessels. No pleural effusion or pneumothorax. The osseous structures and surrounding soft tissues demonstrate no acute abnormality. 1. Likely atelectasis in the left lung base. 2. Cardiomegaly with pulmonary congestion. Likely interstitial edema.  Signed by Dr Vallorie Phoenix on 9/13/2018 12:39 PM    Vl Dup Carotid Bilateral    Result Date: 9/14/2018  Vascular Carotid Procedure  Demographics   Patient Name     Iris YAÑEZ Age                    69   Patient Number   [id number]         Gender                 Female   Visit Number     [id number]      Interpreting Physician Ely Lux abuse  6. PAD  7. Anemia HB 9.1  8. CKD BUN 50 Cr 2.5      Plan:  1. Continue current tx tolerating coreg well  2.  Will sign off f/u outpt status    Corie Donovan MD 9/19/2018 10:44 AM

## 2018-09-27 PROBLEM — I50.42 CHRONIC COMBINED SYSTOLIC (CONGESTIVE) AND DIASTOLIC (CONGESTIVE) HEART FAILURE (HCC): Status: ACTIVE | Noted: 2018-01-01

## 2018-09-27 PROBLEM — I65.23 INTERNAL CAROTID ARTERY STENOSIS, BILATERAL: Status: ACTIVE | Noted: 2018-01-01

## 2018-09-27 NOTE — PROGRESS NOTES
extremities with intermittent claudication 9/22/2014    [facility]bral [facility] occlusion [facility]     Diabetes mellitus ([facility]     Diabetic vasculopathy [facility]     Hypertension     Kidney stone     Peripheral vascular disease ([facility]     Superior mesenteric artery stenosis ([facility] 9/23/2014     Past Surgical History:   Procedure Laterality Date    APPENDECTOMY      CHOLECYSTECTOMY      COLONOSCOPY      FINGER TRIGGER RELEASE Right     HYSTERECTOMY  40 YEARS PRIOR    PARTIAL    KNEE SURGERY Right     TONSILLECTOMY       Family History   Problem Relation Age of Onset    Hypertension Father     Diabetes Father     Stroke Mother     Cancer Mother     Diabetes Mother     Seizures Child      Social History   Substance Use Topics    Smoking status: Current Every Day Smoker     Packs/day: 0.50     Last attempt to quit: 3/10/2016    Smokeless tobacco: Current User    Alcohol use No      Current Outpatient Prescriptions   Medication Sig Dispense Refill    carvedilol (COREG) 6.25 MG tablet Take 1 tablet by mouth 2 times daily (with meals) 60 tablet 3    cilostazol (PLETAL) 100 MG tablet Take 1 tablet by mouth 2 times daily 60 tablet 3    iron polysaccharides (NIFEREX) 150 MG capsule Take 1 capsule by mouth daily 30 capsule 5    allopurinol (ZYLOPRIM) 100 MG tablet Take 1 tablet by mouth daily 30 tablet 3    bumetanide (BUMEX) 1 MG tablet Take 1 tablet by mouth daily 30 tablet 3    cephALEXin (KEFLEX) 250 MG capsule Take 1 capsule by mouth every 12 hours 14 capsule 0    amLODIPine (NORVASC) 10 MG tablet Take 1 tablet by mouth daily 30 tablet 3    insulin glargine (LANTUS) 100 UNIT/ML injection vial Inject 20 Units into the skin nightly 1 vial 3    gabapentin (NEURONTIN) 100 MG capsule Take 1 capsule in am and 3 at bedtime. . 120 capsule 5    HYDROcodone-acetaminophen (NORCO) 7.5-325 MG per tablet Take 1 tablet by mouth every 6 hours as needed for Pain .  15 tablet 0    clopidogrel internal carotid   artery(ies).   There is 50-69% stenosis in the right internal carotid artery.   There is 50-69% stenosis of the left internal carotid artery.   There is normal antegrade flow in the bilateral vertebral artery(ies).      Signature      ----------------------------------------------------------------   Electronically signed by Grisel Holland MD(Interpreting   physician) on 09/14/2018 04:50 PM        Assessment:     Diagnosis Orders   1. Chronic combined systolic (congestive) and diastolic (congestive) heart failure ([address]     2. Atherosclerosis of native artery of both lower extremities with intermittent claudication (HCC)  EKG 12 lead   3. Internal carotid artery stenosis, bilateral     4. Superior mesenteric artery stenosis ([address]     5. Essential hypertension          Patient is  on GDMT including ACE/ARB, BB, Aldosterone antagonist.  Class III - Symptoms of HF on less-than-ordinary exertion, Newly Diagnosed? No, Heart Failure Type: Systolic and diastolic       no know adherence challenges  States taking medications as prescribed  Stable cardiovascular status. No evidence of overt heart failure, angina or dysrhythmia. Plan:    Approximately 50 min spent with daughter and patient in review of CHF pathophysiology, S/S, medications, reviewed echo results, daily weights, sodium intake/ diet/ exercise/ and fluid intake. The daughter is having some concerns about some memory loss and lack of understanding. She states her mother will node her head in agreement with the provider and then get in the car and remark she did not understand or remember what was said. I have advised her to discuss this with her PCP. We will keep her medications as is today. She is given another heart failure folder and the daughter states the first one had papers that were not legible. Berwyn Bence, RN navigator has reviewed the contents today. She is asked to return with a completed log of daily weights and BP readings. The daughter is going to purchase a talking scale as the patient can not see the current scale. She will need a stress test outpatient as well. Will look at scheduling soon. Guideline Directed Medical Therapy (GDMT):   You will be monitored and placed on GDMT. This will include medications such as Diuretics, Ace Inhibitors, Ace Receptor Blockers (ARB), Beta Blockers (BB), Aldosterone Antagonists, and newer combination medication Entresto. Weigh daily:  Learn what your \"dry\" or \"ideal\" weight is - Dry weight is your weight without extra water (fluid). Weigh yourself at the same time each day, preferably in the morning, in similar clothing, after urinating but before eating, and on the same scale. Record your weight in a diary or calendar. If you gain two pounds in a day or five pounds in a week, call and report as you may be directed to increase your diuretic. Diet:   Low sodium diet- < 2000mg daily   Fluid Restriction of 1500 mL per 24 hours unless notified otherwise. Exercise: Your goal is to progress to 30 minutes a day. Recommendations have been reviewed with you by the heart failure nurse and provided in your information folder. Sleep Apnea  If you have sleep apnea and use a CPAP/ BIPAP- please continue to be compliant in your use. It has been shown that sleep apnea may coexist with patients with heart disease and has been indicated in the progression of heart failure and vascular disease. If you have never been tested for sleep apnea, a [place] sleepiness scale has been given to you. If you score above a 10 we will recommend you for overnight sleep study. Those results will be sent to your primary care physician for further management as directed. Labs: You will be screened for anemia, diabetes, thyroid disease, kidney disease, and hyperlipidemia. If you have not had recent labs you may be asked to have a CBC, CMP, BNP, HgA1C, and TSH drawn.  Abnormal results will be referred to your primary care physician for further evaluation and management. Testing: An Ultrasound of your heart called an Echocardiogram may be repeated as directed. Follow up: You will follow up in the heart failure clinic in 2 weeks. You will receive a call from the heart failure nurse in between your visits. You will follow up with your primary care physician once discharged from the clinic. You will follow up with your cardiologist once discharged from the clinic. Report any abnormal edema or increasing shortness of breath  Monitor BP at home- goal at rest < 130/80  Call with any questions or concerns  Report any new problems  Continue current medications as directed  Continue plan of treatment  It is always recommended that you bring your medications bottles with you to each visit - this is for your safety! Smoking:  Quit smoking. Call the [address] [place], [place] [address] 5-678-QUIT-NOW     Quit Now [place] is a FREE online service available to [place] residents 13years of age and over. When you become a member, it offers a free telephone service, so you can speak to a  in person, if you would prefer. Call the [facility] or [phone]. Special tools, a support team of coaches, research-based information, and a community of others trying to become tobacco free. Expert coaches can talk to you about overcoming common barriers, such as dealing with stress, fighting cravings, coping with irritability, and controlling weight gain. https://www.Sara Campbell.[email or web address]/    Https://www.quitnow[place].org/    Nicotine is an addictive drug found in tobacco that causes changes in the brain - making people crave it more and more. When prolonged tobacco use is stopped, it can cause unpleasant withdrawal symptoms, including irritability and anxiety. BENEFITS OF STOPPING SMOKIN minutes after quitting - Your heart rate and blood pressure drop.   12 hours after quitting - The carbon monoxide level in your blood drops to normal.  2 weeks to 3 months after quitting - Your circulation improves and your lung function increases. 1 to 9 months after quitting - Coughing and shortness of breath decrease; cilia (tiny hair-like structures that move mucus out of the lungs) start to regain normal function in the lungs, increasing the ability to handle mucus, clean the lungs, and reduce the risk of infection. 1 year after quitting - The excess risk of coronary heart disease is half that of a continuing smoker. 5 years after quitting - Risk of cancer of the mouth, throat, esophagus, and bladder are cut in half. Cervical cancer risk falls to that of a non-smoker. Stroke risk can fall to that of a non-smoker after 2-5 years. 10 years after quitting - The risk of dying from lung cancer is about half that of a person who is still smoking. The risk of cancer of the larynx (voice box) and pancreas decreases.     FRAN Granados

## 2018-09-27 NOTE — TELEPHONE ENCOUNTER
Spoke with Nani Malave with [facility] stating patient is enrolled in our HF clinic. Please provide status updates, lab results to our office. Sent email to Kacey Calzada, HF nurse with [facility] as well. Patient to have labs drawn by [place] tomorrow, please fax results to our office.

## 2018-10-04 NOTE — TELEPHONE ENCOUNTER
Left a message for the patient asking for status update. BNP is more elevated per yesterday's lab work.

## 2018-10-05 NOTE — TELEPHONE ENCOUNTER
I saw your next note as well stating Dr. Macias Given office called about increasing her Bumex. I agree. Make sure she is watching her sodium and fluid intake because she was not at her first visit. Move her apt up with me or with Ambar Perez to get her in sooner.

## 2018-10-05 NOTE — TELEPHONE ENCOUNTER
Received a message from Molina Mcconnell, patient's daughter who stated she just received a call from Umu Grullon with Dr. Alison Maddox. Molina Mcconnell stated the patient has been instructed to increase bumex to 2 mg every other day.

## 2018-10-05 NOTE — TELEPHONE ENCOUNTER
Received a return call from patient's daughter Shameka Ohara re: elevated BNP results. She stated the patient is holding fluid in her legs up to shins and in her right hand. Shameka Ohara describes the swelling as pitting type in her legs. Shameka Ohara stated patient's wt has not increased just fluctuates up and down 1-2 lbs. BP is running around 130-140/80's. She stated the patient has had a wet sounding cough the last 2 nights and patient took a breathing treatment. Shameka Lev stated the patient has not complained of chest pain/pressue and they have O2 sat monitor that shows the patient's O2 sat is normal. Shameka Lev stated she left a message with Dr. Castillo Jackson office today about the above symptoms but has not yet had a return call. Shameka Lev stated the patient has not missed any of her medications and verified that patient is taking bumex 1 mg daily. Shameka Ohara stated she is hoping Dr Gibson's office will call her back today. This information will be forwarded to [facility] for review.

## 2018-10-12 NOTE — TELEPHONE ENCOUNTER
I spoke with Lan Ortiz RN. Will have patient increase Bumex to twice daily today and tomorrow with repeat BMP on Monday am. Keep apt with me.

## 2018-10-17 NOTE — PROGRESS NOTES
the same time each day, preferably in the morning, in similar clothing, after urinating but before eating, and on the same scale. Record your weight in a diary or calendar. If you gain two pounds in a day or five pounds in a week, call and report as you may be directed to increase your diuretic. Diet:   Low sodium diet- < 2000mg daily   Fluid Restriction of 1500 mL per 24 hours unless notified otherwise. Exercise: Your goal is to progress to 30 minutes a day. Recommendations have been reviewed with you by the heart failure nurse and provided in your information folder. Sleep Apnea  If you have sleep apnea and use a CPAP/ BIPAP- please continue to be compliant in your use. It has been shown that sleep apnea may coexist with patients with heart disease and has been indicated in the progression of heart failure and vascular disease. If you have never been tested for sleep apnea, a [place] sleepiness scale has been given to you. If you score above a 10 we will recommend you for overnight sleep study. Those results will be sent to your primary care physician for further management as directed. Labs: You will be screened for anemia, diabetes, thyroid disease, kidney disease, and hyperlipidemia. If you have not had recent labs you may be asked to have a CBC, CMP, BNP, HgA1C, and TSH drawn. Abnormal results will be referred to your primary care physician for further evaluation and management. Testing: An Ultrasound of your heart called an Echocardiogram may be repeated as directed. Follow up: You will follow up in the heart failure clinic in 2 weeks. You will receive a call from the heart failure nurse in between your visits. You will follow up with your primary care physician once discharged from the clinic. You will follow up with your cardiologist once discharged from the clinic.      Report any abnormal edema or increasing shortness of breath  Monitor BP at home- goal at rest <

## 2018-10-19 NOTE — TELEPHONE ENCOUNTER
Left a message for Marylene Orem, Dr Gibson's nurse, stating patient reported that her pharmacy has not received the new RX for bumex 2 mg qod. Asked that RX be sent to [facility]'s [facility] and that patient be contacted re: this issue.

## 2018-10-22 NOTE — TELEPHONE ENCOUNTER
Patients daughter called in to confirm what dose of Coreg the patient should be taking she was on 6.25 BID from Dr. Reinier Mcdaniels and then we called in an RX for 12.5 1/2 tablet BID. She believes she should be taking 12.5 BID. Pillo Claros, can you please confirm what she should be taking?

## 2018-10-25 NOTE — TELEPHONE ENCOUNTER
Spoke with patient who stated her sob and swelling of feet/ankles is the same. Patient stated her wt today is 130 lbs, did not know BP readings. Patient stated her wt is staying the same. She stated she is tolerating the low salt diet and stated she has not missed any of her medications. Patient informed that BNP level from this week is up a little. Patient encouraged to closely follow the low salt diet and monitor fluid intake. Will forward BNP results to [facility] and nurse will call patient if any changes in treatment are required. BNP level of 10/8/18 was 9982 and level on 10/23/18 was 43289.

## 2018-11-01 PROBLEM — N18.9 CHRONIC KIDNEY DISEASE: Status: ACTIVE | Noted: 2018-01-01

## 2018-11-01 NOTE — PROGRESS NOTES
LIPID PANEL:  Lab Results   Component Value Date    HDL 33 12/01/2017    LDLDIRECT 169 01/12/2016    LDLCALC 127 12/01/2017    TRIG 267 12/01/2017     LIVER PROFILE:No results for input(s): AST, ALT, LABALBU in the last 72 hours. Past Medical History:   Diagnosis Date    Arthritis     Atherosclerosis of native arteries of the extremities with intermittent claudication 9/22/2014    Cerebral artery occlusion with cerebral infarction ([address]     Diabetes mellitus ([address]     Diabetic vasculopathy ([address]     Hypertension     Kidney stone     Peripheral vascular disease ([address]     Superior mesenteric artery stenosis ([address] 9/23/2014     Past Surgical History:   Procedure Laterality Date    APPENDECTOMY      CHOLECYSTECTOMY      COLONOSCOPY      FINGER TRIGGER RELEASE Right     HYSTERECTOMY  40 YEARS PRIOR    PARTIAL    KNEE SURGERY Right     TONSILLECTOMY       Family History   Problem Relation Age of Onset    Hypertension Father     Diabetes Father     Stroke Mother     Cancer Mother     Diabetes Mother     Seizures Child      Allergies   Allergen Reactions    Aspirin Nausea And Vomiting    Codeine Nausea And Vomiting     Current Outpatient Prescriptions   Medication Sig Dispense Refill    bumetanide (BUMEX) 1 MG tablet Take 2 mg by mouth daily      cilostazol (PLETAL) 100 MG tablet Take 1 tablet by mouth 2 times daily 60 tablet 3    iron polysaccharides (NIFEREX) 150 MG capsule Take 1 capsule by mouth daily 30 capsule 5    allopurinol (ZYLOPRIM) 100 MG tablet Take 1 tablet by mouth daily 30 tablet 3    cephALEXin (KEFLEX) 250 MG capsule Take 1 capsule by mouth every 12 hours 14 capsule 0    amLODIPine (NORVASC) 10 MG tablet Take 1 tablet by mouth daily 30 tablet 3    insulin glargine (LANTUS) 100 UNIT/ML injection vial Inject 20 Units into the skin nightly 1 vial 3    gabapentin (NEURONTIN) 100 MG capsule Take 1 capsule in am and 3 at bedtime. . 120 capsule 5    HYDROcodone-acetaminophen (NORCO) 7.5-325 MG per tablet Take 1 tablet by mouth every 6 hours as needed for Pain . 15 tablet 0    clopidogrel (PLAVIX) 75 MG tablet Take 1 tablet by mouth daily 30 tablet 3    pantoprazole (PROTONIX) 40 MG tablet Take 1 tablet by mouth 2 times daily (before meals) 30 tablet 3    meclizine (ANTIVERT) 25 MG tablet Take 25 mg by mouth 3 times daily as needed (took at 1100 today)        No current facility-administered medications for this visit. Social History     Social History    Marital status:      Spouse name: N/A    Number of children: 4    Years of education: 12     Occupational History    Not on file. Social History Main Topics    Smoking status: Former Smoker     Packs/day: 0.50     Quit date: 3/10/2016    Smokeless tobacco: Current User    Alcohol use No    Drug use: No    Sexual activity: Not on file     Other Topics Concern    Not on file     Social History Narrative    Born [place]     8 yrs ago     once    Has 2 sons and 2 daughters    Worked at [employer] 27 yrs     Education HS    [address]    Denies alcohol consumption or substance usage    Physically sedentary    Quit driving 4/2/6262    Walks with walker       Physical Examination:  BP (!) 144/80   Pulse 74   Ht 5' 4\" (1.626 m)   Wt 148 lb (67.1 kg)   LMP 03/15/1970 (Approximate)   BMI 25.40 kg/m²   Physical Exam   Constitutional: She appears well-developed and well-nourished. Neck: No JVD present. Carotid bruit is not present. Cardiovascular: Normal rate, regular rhythm and normal heart sounds. Exam reveals no gallop and no friction rub. No murmur heard. Pulmonary/Chest: Effort normal and breath sounds normal. No respiratory distress. She has no wheezes. She has no rales. Abdominal: She exhibits no distension. There is no tenderness. Musculoskeletal: She exhibits edema.    1+ edema bilaterally   Lymphadenopathy:     She has no cervical

## 2018-11-06 PROBLEM — I50.43 ACUTE ON CHRONIC COMBINED SYSTOLIC AND DIASTOLIC CHF (CONGESTIVE HEART FAILURE) (HCC): Status: ACTIVE | Noted: 2018-01-01

## 2018-11-06 NOTE — LETTER
including skilled nursing facilities, home health agencies, inpatient rehabilitation facilities, and long term care hospitals. [facility] is working closely with the doctors and other health care providers that care for you during and following your hospital stay and for a period of time after you leave the hospital. By working together, the health care providers are trying to more efficiently provide well-managed, high quality, patient-centered care as you undergo treatment. Hospitals, doctors, and other health care providers that care for you following a hospital stay may receive an additional payment for providing better, more coordinated health care. Medicare will monitor your care to make sure you and others get high quality care. Your feedback is important     Medicare may also ask you to answer a survey about the services and care you received from [employer] [employer] will be mailed to you. Your feedback will improve care for all people with Medicare who receive care from [facility]. Completion of this survey is optional.     Get more information     For more information about the Bundled Payments for [facility], you can:    · Visit the CMS BPCI Advanced Website at http://polanco-teran.net/ initiatives/bpci-advanced   · Call the [facility] team at (138) 933-9077. · Call 1-800-MEDICARE (1-487.404.5280). TTY users can call 3-245.938.3969     If you have concerns or complaints about your care, talk to your health care provider, or contact your Beneficiary and Family Centered Quality Improvement Organization [facility]). To get your [facility]'s phone number, visit Medicare.gov/contacts or call 1-800-MEDICARE. · To find a different hospital, visit www. hospitalcompare.[facility].gov or call 1-[employer] MEDICARE (1-370.357.6525). TTY users should call 9-209.481.4811. · To find a different doctor, visit Medicare's Physician Compare website, HDTapes.co.nz, or call 1-800-MEDICARE (061 0389). TTY users should call 6-734.606.2410. · To find a different skilled nursing facility, visit [facility] Medico website, [email or web address]/, or call 1-800-MEDICARE (1- 300.565.9914). TTY users should call 2-569.935.5246. · To find a different long term care hospital, visit [facility] 940 Compare website, [employer], or call 1-800- MEDICARE (623 9873). TTY users should call 0-249.242.6691. · To find a different inpatient rehabilitation facility, visit [facility] Compare website, www.medicare.gov/ inpatientrehabilitation facilitycompare, or call 1-800-MEDICARE (0-713.263.3443). TTY users should call 1- 838.375.8555. · To find a different home health agency, visit [address] website, www.medicare.gov/homehealthcompare, or call 1-800-MEDICARE (7-510- 762-6795). TTY users should call 1-635.594.6611.

## 2018-11-07 NOTE — PROGRESS NOTES
Called Dr. Aj Castorena in regard of patients home medications not being ordered yet, nurse was told to reorder home medications according to admission.  Electronically signed by Cinthia Guerrero RN on 11/6/2018 at 11:10 PM

## 2018-11-07 NOTE — PROGRESS NOTES
Pt called nurses station asking for help. Pt was in bed, confused, crying stating she is going to die. PT's O2 level was 84 on room air. NC placed on pt with 2L of O2. Pt reoriented to time and situation. Pt o2 brought up to 92 with 3L NC. VS stable. Pt's Nurse now in room.

## 2018-11-07 NOTE — PROGRESS NOTES
Patient's daughter Robbie Paul stated that the patient had a urine sample taken today by the Home Health nurse due to patient having a UTI prior to this admission.   Electronically signed by Darnell Rodriguez RN on 11/6/2018 at 6:06 PM

## 2018-11-07 NOTE — H&P
Office Visit     11/6/2018  [facility] FRAN Dozier   Certified Nurse Practitioner   Acute on chronic combined systolic and diastolic CHF (congestive heart failure) ([address] +3 more   Dx   Congestive Heart Failure   Reason for Visit    Progress Notes       Dear Drs. No ref. provider found & Justo Gannon MD,     Thank you for allowing me to participate in the care of Ms. Malena Hall. She presents today at the [address] [place] [address] in the [facility] with her daughter. As you know, Ms. Nicole Montes is a 69 y.o. female with history of hypertension, diabetes, CKD and CHF who presents with the chief complaint of dyspnea. She is a patient of Dr. Carol Gonzalez.      Patient was seen by the home health nurse today. The nurse called in to the office requesting an appointment due to patient having worsening dyspnea, weight gain, and pitting edema to bilateral extremities. The patient came in for evaluation. Audible wheezes were heard when entering the room. Patient presented in a wheelchair because she cannot stand. She has conversational dyspnea and most of the questions were answered by her daughter. Daughter states that patient has been coughing, \"snotting \" and sneezing for the past 2 weeks. She states that it has progressively worsened over the past week. She has tried to get her mother to go to one of the urgent care clinics but she refused. Patient has been coughing with production of small amount of clear phlegm. She has been taking breathing treatments at home. She was seen by Dr. Fransisca Miranda in the office last week. He started her on Zaroxolyn twice a week. Daughter stated that patient has only had one tablet. Patient complains of shortness of breat at rest and occasional chest pain. Denies fever. She otherwise denies syncope or near syncope. She was hospitalized in September for CHF and was referred to the CHF clinic. of education: 15          Occupational History    Not on file.            Social History Main Topics    Smoking status: Former Smoker       Packs/day: 0.50       Quit date: 9/10/2018    Smokeless tobacco: Former User    Alcohol use No    Drug use: No    Sexual activity: Not on file           Other Topics Concern    Not on file          Social History Narrative     Born [place]      8 yrs ago      once     Has 2 sons and 2 daughters     Worked at [employer] 27 yrs      Education HS     [employer]     Smokes 1/2 ppd     Denies alcohol consumption or substance usage     Physically sedentary     Quit driving 8/7/5292     Walks with walker                 Allergies   Allergen Reactions    Aspirin Nausea And Vomiting    Codeine Nausea And Vomiting         Current Medication   No current outpatient prescriptions on file.        PE:      Vitals:     11/06/18 1520   BP: 98/62   Pulse: 70   SpO2: 95%         Estimated body mass index is 25.4 kg/m² as calculated from the following:    Height as of this encounter: 5' 4\" (1.626 m). Weight as of this encounter: 148 lb (67.1 kg).    Constitutional: She is oriented to person, place, and time. She appears well-developed and well-nourished in moderate distress. Head: Normocephalic and atraumatic. Neck:  Neck supple without JVD present. Cardiovascular: Normal rate, Regular rhythm, normal heart sounds. No murmur ascultated. No gallop and no friction rub. 2+ peripheral edema up to knees bilaterally. Pulmonary/Chest:  Audible wheezes. Crackles and wheezes heard bilaterally with tachypnea. Musculoskeletal: Normal range of motion. She is using a wheelchair. Neurological: She is alert and oriented to person, place, and time. Skin: Skin is warm and dry without rash or pallor. Psychiatric: She has a normal mood and affect.  Her behavior is normal. Thought content normal.            Lab Results   Component Value Date

## 2018-11-07 NOTE — PROGRESS NOTES
4 Eyes Skin Assessment    Raeann Hale is being assessed upon: Admission    I agree that Tonja Fierro, along with Wil Jimenez, RN (either 2 RN's or 1 LPN and 1 RN) have performed a thorough Head to Toe Skin Assessment on the patient. ALL assessment sites listed below have been assessed. Areas assessed by both nurses:     [x]   Head, Face, and Ears   [x]   Shoulders, Back, and Chest  [x]   Arms, Elbows, and Hands   [x]   Coccyx, Sacrum, and Ischium  [x]   Legs, Feet, and Heels    Does the Patient have Skin Breakdown? No    Jann Prevention initiated: Yes  Wound Care Orders initiated: No    WOC nurse consulted for Pressure Injury (Stage 3,4, Unstageable, DTI, NWPT, and Complex wounds) and New or Established Ostomies: No        Primary Nurse eSignature:  Jennifer Vallejo RN on 11/6/2018 at 6:04 PM      Co-Signer eSignature: {Esignature:[id number]}

## 2018-11-07 NOTE — CONSULTS
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[facility])  No date: Hypertension  No date: Kidney stone  No date: Peripheral vascular disease ([facility]  9/23/2014: Superior mesenteric artery stenosis ([facility])      Review of Systems    Constitutional:  No weight loss, no fever/chills  Eyes:  No eye pain, no eye redness  Cardiovascular:  No chest pain, no worsening of edema  Respiratory:  No hemoptysis, no stidor  Gastrointestinal:  No blood in stool, no n/v, no diarrhea  Genitoruinary:  No hematuria, no difficulty with urination  Musculoskeletal:  No joint swelling, no redness  Integumentary:  No Rash, no itching  Neurological:  No focal weakness, No new sensory deficit  Psychiatric:  No depression, no confusion  Endocrine:  No polyuria, no polydipsia       Medications     Current Facility-Administered Medications:   glucose (GLUTOSE) 40 % oral gel 15 g, 15 g, Oral, PRN, Nick Barragan MD  dextrose 50 % solution 12.5 g, 12.5 g, Intravenous, PRN, Nick Barragan MD  glucagon (rDNA) injection 1 mg, 1 mg, Intramuscular, PRN, Nick Barragan MD   dextrose 5 % solution, 100 mL/hr, Intravenous, PRN, Nick Barragan MD  insulin lispro (HUMALOG) injection vial 0-12 Units, 0-12 Units, Subcutaneous, TID WC, Nick Barragan MD, Stopped at 11/07/18 0800  insulin lispro (HUMALOG) injection vial 0-6 Units, 0-6 Units, Subcutaneous, Nightly, Nick Barragan MD  cefTRIAXone (ROCEPHIN) 1 g in sodium chloride (PF) 10 mL IV syringe, 1 g, Intravenous, Q24H, Nick Barragan MD, 1 g at 11/07/18 0816  sodium chloride flush 0.9 % injection 10 mL, 10 mL, Intravenous, 2 times per day, FRAN Castellon, 10 mL at 11/07/18 1018  sodium chloride flush 0.9 % injection 10 mL, 10 mL, Intravenous, PRN, FRAN Castellon  magnesium hydroxide (MILK OF MAGNESIA) 400 MG/5ML suspension 30 mL, 30 mL, Oral, Daily PRN, FRAN Castellon  ondansetron [facility]) injection 4 mg, 4 mg, Intravenous, Q6H PRN, FRAN Castellon  ipratropium-albuterol (DUONEB) nebulizer solution 1 ampule, 1 ampule, Inhalation, Q4H PRN, FRAN Castellon  bumetanide (BUMEX) injection 1 mg, 1 mg, Intravenous, Q12H, Fatimah Perrea, APRN, 1 mg at 11/07/18 4831  heparin (porcine) injection 5,000 Units, 5,000 Units, Subcutaneous, BID, FRAN Castellon, 5,000 Units at 11/07/18 1003  influenza quadrivalent split vaccine (FLUZONE;FLUARIX;FLULAVAL;AFLURIA) injection 0.5 mL, 0.5 mL, Intramuscular, Once, Donna Wisdom MD  gabapentin (NEURONTIN) capsule 300 mg, 300 mg, Oral, Nightly, Donna Wisdom MD, 300 mg at 11/06/18 2342  gabapentin (NEURONTIN) capsule 100 mg, 100 mg, Oral, Daily, Donna Wisdom MD, 100 mg at 11/07/18 1003  carvedilol (COREG) tablet 12.5 mg, 12.5 mg, Oral, BID WC, Donna Wisdom MD, 12.5 mg at 11/07/18 1119  amLODIPine (NORVASC) tablet 10 mg, 10 mg, Oral, Daily, Donna Wisdom MD, 10 mg at 11/07/18 1003  pantoprazole (PROTONIX) tablet 40 mg, 40 mg, Oral, BID AC, Donna Wisdom MD, 40 mg at 11/07/18 0625  insulin glargine (LANTUS) injection vial 20 Units, 20 Units, Subcutaneous, Nightly, Donna Wisdom MD, 20 Units at 11/07/18 0124  clopidogrel (PLAVIX) tablet 75 mg, 75 mg, Oral, Daily, Donna Wisdom MD, 75 mg at 11/07/18 1003  cilostazol (PLETAL) tablet 100 mg, 100 mg, Oral, BID, Donna Wisdom MD, 100 mg at 11/07/18 1003  allopurinol (ZYLOPRIM) tablet 100 mg, 100 mg, Oral, Daily, Donna Wisdom MD, 100 mg at

## 2018-11-07 NOTE — PROGRESS NOTES
systolic (congestive) and diastolic (congestive) heart failure (HCC)        Internal carotid artery stenosis, bilateral        Acute on chronic combined systolic and diastolic CHF (congestive heart failure) ([facility]              Subjective:  Ms. Tyler Floyd seen today admitted yesterday from the office for increasing dyspnea and suspected volume overload. Placed on intravenous Bumex given Zaroxolyn. Feels better today. Dyspnea improved. Denies chest pain. No other complaints. Objective:   /76   Pulse 85   Temp 96.6 °F (35.9 °C) (Temporal)   Resp 18   Ht 5' 4\" (1.626 m)   Wt 147 lb 2 oz (66.7 kg)   LMP 03/15/1970 (Approximate)   SpO2 95%   BMI 25.25 kg/m²       Intake/Output Summary (Last 24 hours) at 11/07/18 1050  Last data filed at 11/07/18 1018   Gross per 24 hour   Intake               10 ml   Output              100 ml   Net              -90 ml       Prior to Admission medications    Medication Sig Start Date End Date Taking? Authorizing Provider   Dextromethorphan-Guaifenesin (DIABETIC TUSSIN DM PO) Take by mouth 2 times daily   Yes Historical Provider, MD   gabapentin (NEURONTIN) 100 MG capsule Take 100 mg by mouth 2 times daily. Take 1 100mg tablet in AM and 3 100mg tablets at bedtime .    Yes Historical Provider, MD   insulin glargine (BASAGLAR KWIKPEN) 100 UNIT/ML injection pen Inject 20 Units into the skin nightly   Yes Historical Provider, MD   bumetanide (BUMEX) 1 MG tablet Take 2 mg by mouth daily   Yes Historical Provider, MD   carvedilol (COREG) 12.5 MG tablet Take 1 tablet by mouth 2 times daily (with meals) 11/1/18  Yes Neeru Quevedo MD   metolazone (ZAROXOLYN) 2.5 MG tablet Take 1 tablet by mouth as needed (only use up to twice a week) 11/1/18  Yes Neeru Quevedo MD   cilostazol (PLETAL) 100 MG tablet Take 1 tablet by mouth 2 times daily 9/19/18  Yes Rock Nick MD   iron polysaccharides (NIFEREX) 150 MG capsule Take 1 capsule by mouth daily 9/19/18  Yes Rock Romero MD   allopurinol (ZYLOPRIM) 100 MG tablet Take 1 tablet by mouth daily 9/19/18  Yes Ronnell August MD   amLODIPine (NORVASC) 10 MG tablet Take 1 tablet by mouth daily 9/19/18  Yes Ronnell August MD   clopidogrel (PLAVIX) 75 MG tablet Take 1 tablet by mouth daily 3/30/16  Yes Lincoln Polanco MD   pantoprazole (PROTONIX) 40 MG tablet Take 1 tablet by mouth 2 times daily (before meals) 3/30/16  Yes Lincoln Polanco MD   meclizine (ANTIVERT) 25 MG tablet Take 25 mg by mouth 3 times daily as needed (took at 1100 today)    Yes Historical Provider, MD   HYDROcodone-acetaminophen (NORCO) 7.5-325 MG per tablet Take 1 tablet by mouth every 6 hours as needed for Pain .  2/14/17   Jorge Sanchez MD        insulin lispro  0-12 Units Subcutaneous TID WC    insulin lispro  0-6 Units Subcutaneous Nightly    cefTRIAXone (ROCEPHIN) IV  1 g Intravenous Q24H    sodium chloride flush  10 mL Intravenous 2 times per day    bumetanide  1 mg Intravenous Q12H    heparin (porcine)  5,000 Units Subcutaneous BID    influenza virus vaccine  0.5 mL Intramuscular Once    gabapentin  300 mg Oral Nightly    gabapentin  100 mg Oral Daily    carvedilol  12.5 mg Oral BID WC    amLODIPine  10 mg Oral Daily    pantoprazole  40 mg Oral BID AC    insulin glargine  20 Units Subcutaneous Nightly    clopidogrel  75 mg Oral Daily    cilostazol  100 mg Oral BID    allopurinol  100 mg Oral Daily    iron polysaccharides  150 mg Oral Daily       TELEMETRY: Sinus     Physical Exam:      Physical Exam      General:  Awake, alert, NAD  Skin:  Warm and dry  Neck:  no jvd , no carotid bruits  Chest:  Clear to auscultation, no wheezing or rales  Cardiovascular:  RRR N4P2 no murmurs, clicks, gallups, or rubs  Abdomen:  Soft nontender, nondistended, bowel sounds present  Extremities:  Edema: 1+     Lab Data:  CBC: Recent Labs      11/06/18 1819   WBC  7.3   HGB  9.1*   HCT  29.5*   MCV  91.9   PLT  336     BMP: Recent Labs      11/06/18 1819   NA 140   K  4.0   CL  100   CO2  23   BUN  52*   CREATININE  2.4*     LIVER PROFILE: No results for input(s): AST, ALT, LIPASE, BILIDIR, BILITOT, ALKPHOS in the last 72 hours. Invalid input(s): AMYLASE,  ALB  PT/INR: No results for input(s): PROTIME, INR in the last 72 hours. APTT: No results for input(s): APTT in the last 72 hours. BNP:  No results for input(s): BNP in the last 72 hours. CK, CKMB, Troponin: @LABRCNT (CKTOTAL:3, CKMB:3, TROPONINI:3)@    IMAGING:  Xr Chest Portable    Result Date: 11/6/2018  XR CHEST PORTABLE 11/6/2018 3:45 PM HISTORY: Shortness of breath COMPARISON: 9/13/2018. FINDINGS: Frontal view of the chest was obtained. Opacities in the left lower lobe are seen. Pulmonary vascularity is slightly increased. The cardiac silhouette remains enlarged. There is atherosclerosis in the aorta. The osseous structures and surrounding soft tissues demonstrate no acute abnormality. 1. Pulmonary vascular congestion and small left pleural effusion. Signed by Dr Chico Joe on 11/6/2018 8:01 PM        Assessment:  1. Congestive heart failure improved  2. Chronic kidney disease with BUN 52 creatinine 2.4  3. Peripheral arterial disease severe  4. Anemia with hemoglobin 9.1  5. Diabetes  6. Hypertension  7. Superior mesenteric artery stenosis  8. Carotid artery stenosis  9. History of CVA      Plan:  1.  Continue diuresis will need to keep at least another 24-48 hours    Yenny Buckley MD 11/7/2018 10:50 AM

## 2018-11-08 NOTE — PROGRESS NOTES
Progress Note  11/8/2018 6:53 AM  Subjective:   Admit Date: 11/6/2018  PCP: Nita Sutherland MD    Interval History: Better    DIET CARB CONTROL; No Caffeine    Intake/Output Summary (Last 24 hours) at 11/08/18 0653  Last data filed at 11/08/18 0456   Gross per 24 hour   Intake              550 ml   Output             1275 ml   Net             -725 ml     Medications:      dextrose        insulin lispro  0-12 Units Subcutaneous TID WC    insulin lispro  0-6 Units Subcutaneous Nightly    cefTRIAXone (ROCEPHIN) IV  1 g Intravenous Q24H    b complex-C-folic acid  1 capsule Oral Daily    sodium chloride flush  10 mL Intravenous 2 times per day    bumetanide  1 mg Intravenous Q12H    heparin (porcine)  5,000 Units Subcutaneous BID    influenza virus vaccine  0.5 mL Intramuscular Once    gabapentin  300 mg Oral Nightly    gabapentin  100 mg Oral Daily    carvedilol  12.5 mg Oral BID WC    amLODIPine  10 mg Oral Daily    pantoprazole  40 mg Oral BID AC    insulin glargine  20 Units Subcutaneous Nightly    clopidogrel  75 mg Oral Daily    cilostazol  100 mg Oral BID    allopurinol  100 mg Oral Daily    iron polysaccharides  150 mg Oral Daily     Recent Labs      11/06/18   1819   WBC  7.3   HGB  9.1*   PLT  336     Recent Labs      11/06/18   1819   NA  140   K  4.0   CL  100   CO2  23   BUN  52*   CREATININE  2.4*   GLUCOSE  137*     No results for input(s): AST, ALT, ALB, BILITOT, ALKPHOS in the last 72 hours.     Objective:   Vitals: /67   Pulse 87   Temp 97 °F (36.1 °C) (Temporal)   Resp 16   Ht 5' 4\" (1.626 m)   Wt 145 lb 14.4 oz (66.2 kg)   LMP 03/15/1970 (Approximate)   SpO2 93%   BMI 25.04 kg/m²   General appearance: alert and cooperative with exam  Lungs: Bibasilar rales  Heart: regular rate and rhythm, S1, S2 normal, no murmur, click, rub or gallop  Abdomen: soft, non-tender; bowel sounds normal; no masses,  no organomegaly  Extremities: Bilateral 1+edema  Neurologic: No obvious

## 2018-11-09 NOTE — PROGRESS NOTES
Deny Salter MD   200 mg at 11/09/18 0910    acetaminophen (TYLENOL) tablet 650 mg  650 mg Oral Q4H PRN Reina Stewart MD   650 mg at 11/09/18 0716    glucose (GLUTOSE) 40 % oral gel 15 g  15 g Oral PRN Peter Peralta MD        dextrose 50 % solution 12.5 g  12.5 g Intravenous PRN Peter Peralta MD        glucagon (rDNA) injection 1 mg  1 mg Intramuscular PRN Peter Peralta MD        dextrose 5 % solution  100 mL/hr Intravenous PRN Peter Peralta MD        insulin lispro (HUMALOG) injection vial 0-12 Units  0-12 Units Subcutaneous TID WC Peter Peralta MD   2 Units at 11/08/18 1738    insulin lispro (HUMALOG) injection vial 0-6 Units  0-6 Units Subcutaneous Nightly Peter Peralta MD   2 Units at 11/08/18 2115    b complex-C-folic acid (NEPHROCAPS) capsule 1 mg  1 capsule Oral Daily Deny Salter MD   1 mg at 11/09/18 0911    sodium chloride flush 0.9 % injection 10 mL  10 mL Intravenous 2 times per day Clarisaac Minor, APRN   10 mL at 11/08/18 2115    sodium chloride flush 0.9 % injection 10 mL  10 mL Intravenous PRN Clarisaac Minor, APRN        magnesium hydroxide (MILK OF MAGNESIA) 400 MG/5ML suspension 30 mL  30 mL Oral Daily PRN Clarene Minor, APRN        [facility]ron [facility]) injection 4 mg  4 mg Intravenous Q6H PRN Clarisaac Minor, APRN        ipratropium-albuterol (DUONEB) nebulizer solution 1 ampule  1 ampule Inhalation Q4H PRN Clarene Minor, APRN        bumetanide [facility]) injection 1 mg  1 mg Intravenous Q12H Clarene Minor, APRN   1 mg at 11/09/18 0548    heparin (porcine) injection 5,000 Units  5,000 Units Subcutaneous BID Liborio Minor APRN   5,000 Units at 11/09/18 9202    influenza quadrivalent split vaccine (FLUZONE;FLUARIX;FLULAVAL;AFLURIA) injection 0.5 mL  0.5 mL Intramuscular Once Reina Stewart MD        gabapentin (NEURONTIN) capsule 300 mg  300 mg Oral Nightly Reina Stewart MD   300 mg at 11/08/18 2667    children: 4    Years of education: 12     Occupational History    Not on file. Social History Main Topics    Smoking status: Former Smoker     Packs/day: 0.50     Quit date: 9/10/2018    Smokeless tobacco: Former User    Alcohol use No    Drug use: No    Sexual activity: Not on file     Other Topics Concern    Not on file     Social History Narrative    Born [place]     8 yrs ago     once    Has 2 sons and 2 daughters    Worked at [employer] 27 yrs     Education [id number] 1/2 ppd    Denies alcohol consumption or substance usage    Physically sedentary    Quit driving 4/5/0499    Walks with walker         Review of Systems:  History obtained from chart review and the patient  General ROS: No fever or chills  Respiratory ROS: No cough, +shortness of breath, wheezing  Cardiovascular ROS: no chest pain but dyspnea on exertion  Gastrointestinal ROS: No abdominal pain or melena  Genito-Urinary ROS: No dysuria or hematuria  Musculoskeletal ROS: No joint pain or swelling         Objective:  Blood pressure 112/64, pulse 83, temperature 97 °F (36.1 °C), temperature source Temporal, resp. rate 16, height 5' 4\" (1.626 m), weight 147 lb 4 oz (66.8 kg), last menstrual period 03/15/1970, SpO2 93 %, not currently breastfeeding.     Intake/Output Summary (Last 24 hours) at 11/09/18 1009  Last data filed at 11/09/18 0924   Gross per 24 hour   Intake             1210 ml   Output             2125 ml   Net             -915 ml     General: alert and oriented x3   Chest:  clear to auscultation bilaterally without respiratory distress  CVS: regular rate and rhythm  Abdominal: soft, nontender, normal bowel sounds  Extremities: no cyanosis but trace leg edema  Skin: warm and dry without rash    Labs:  BMP:   Recent Labs      11/06/18   1819  11/08/18   1916  11/09/18   0236   NA  140  135*  137   K  4.0  3.7  3.4*   CL  100  94*  97*   CO2  23  25  24   BUN  52*  55*  58* CREATININE  2.4*  2.5*  2.5*   CALCIUM  9.1  8.6*  8.6*     CBC:   Recent Labs      11/06/18   1819   WBC  7.3   HGB  9.1*   HCT  29.5*   MCV  91.9   PLT  336     LIVER PROFILE: No results for input(s): AST, ALT, LIPASE, BILIDIR, BILITOT, ALKPHOS in the last 72 hours. Invalid input(s): AMYLASE,  ALB  PT/INR: No results for input(s): PROTIME, INR in the last 72 hours. APTT: No results for input(s): APTT in the last 72 hours. BNP:  No results for input(s): BNP in the last 72 hours. Ionized Calcium:No results for input(s): IONCA in the last 72 hours. Magnesium:No results for input(s): MG in the last 72 hours. Phosphorus:No results for input(s): PHOS in the last 72 hours. HgbA1C: No results for input(s): LABA1C in the last 72 hours. Hepatic: No results for input(s): ALKPHOS, ALT, AST, PROT, BILITOT, BILIDIR, LABALBU in the last 72 hours. Lactic Acid: No results for input(s): LACTA in the last 72 hours. Troponin: No results for input(s): CKTOTAL, CKMB, TROPONINT in the last 72 hours. ABGs: No results for input(s): PH, PCO2, PO2, HCO3, O2SAT in the last 72 hours. CRP:  No results for input(s): CRP in the last 72 hours. Sed Rate:  No results for input(s): SEDRATE in the last 72 hours. Cultures:   No results for input(s): CULTURE in the last 72 hours. Radiology reports as per the Radiologist  Radiology: Xr Chest Portable    Result Date: 11/6/2018  XR CHEST PORTABLE 11/6/2018 3:45 PM HISTORY: Shortness of breath COMPARISON: 9/13/2018. FINDINGS: Frontal view of the chest was obtained. Opacities in the left lower lobe are seen. Pulmonary vascularity is slightly increased. The cardiac silhouette remains enlarged. There is atherosclerosis in the aorta. The osseous structures and surrounding soft tissues demonstrate no acute abnormality. 1. Pulmonary vascular congestion and small left pleural effusion.  Signed by Dr Amanda Leslie on 11/6/2018 8:01 PM       Assessment     -CKD stage 4 with superimposed

## 2018-11-09 NOTE — PROGRESS NOTES
MD Paige   iron polysaccharides (NIFEREX) 150 MG capsule Take 1 capsule by mouth daily 9/19/18  Yes Jamarcus Brantley MD   allopurinol (ZYLOPRIM) 100 MG tablet Take 1 tablet by mouth daily 9/19/18  Yes Jmaarcus Brantley MD   amLODIPine (NORVASC) 10 MG tablet Take 1 tablet by mouth daily 9/19/18  Yes Jamarcus Brantley MD   clopidogrel (PLAVIX) 75 MG tablet Take 1 tablet by mouth daily 3/30/16  Yes Kylee Victor MD   pantoprazole (PROTONIX) 40 MG tablet Take 1 tablet by mouth 2 times daily (before meals) 3/30/16  Yes Kylee Victor MD   meclizine (ANTIVERT) 25 MG tablet Take 25 mg by mouth 3 times daily as needed (took at 1100 today)    Yes Historical Provider, MD   HYDROcodone-acetaminophen (NORCO) 7.5-325 MG per tablet Take 1 tablet by mouth every 6 hours as needed for Pain .  2/14/17   Sweta Kidd MD        potassium chloride  20 mEq Oral Daily    amoxicillin  500 mg Oral 2 times per day    insulin glargine  10 Units Subcutaneous Nightly    vitamin D  50,000 Units Oral Weekly    calcitRIOL  0.25 mcg Oral Daily    ferrous sulfate  325 mg Oral BID WC    iron sucrose  200 mg Intravenous Daily    insulin lispro  0-12 Units Subcutaneous TID WC    insulin lispro  0-6 Units Subcutaneous Nightly    b complex-C-folic acid  1 capsule Oral Daily    sodium chloride flush  10 mL Intravenous 2 times per day    bumetanide  1 mg Intravenous Q12H    heparin (porcine)  5,000 Units Subcutaneous BID    influenza virus vaccine  0.5 mL Intramuscular Once    gabapentin  300 mg Oral Nightly    gabapentin  100 mg Oral Daily    carvedilol  12.5 mg Oral BID WC    amLODIPine  10 mg Oral Daily    pantoprazole  40 mg Oral BID AC    clopidogrel  75 mg Oral Daily    cilostazol  100 mg Oral BID    allopurinol  100 mg Oral Daily    iron polysaccharides  150 mg Oral Daily       TELEMETRY: Sinus     Physical Exam:      Physical Exam      General:  Awake, alert, NAD  Skin:  Warm and dry  Neck:  no jvd , no carotid bruits  Chest:  Clear to auscultation, no wheezing or rales  Cardiovascular:  RRR X5D1 no murmurs, clicks, gallups, or rubs  Abdomen:  Soft nontender, nondistended, bowel sounds present  Extremities:  Edema: none     Lab Data:  CBC: Recent Labs      11/06/18 1819   WBC  7.3   HGB  9.1*   HCT  29.5*   MCV  91.9   PLT  336     BMP: Recent Labs      11/06/18   1819 11/08/18   1916  11/09/18   0236   NA  140  135*  137   K  4.0  3.7  3.4*   CL  100  94*  97*   CO2  23  25  24   BUN  52*  55*  58*   CREATININE  2.4*  2.5*  2.5*     LIVER PROFILE: No results for input(s): AST, ALT, LIPASE, BILIDIR, BILITOT, ALKPHOS in the last 72 hours. Invalid input(s): AMYLASE,  ALB  PT/INR: No results for input(s): PROTIME, INR in the last 72 hours. APTT: No results for input(s): APTT in the last 72 hours. BNP:  No results for input(s): BNP in the last 72 hours. CK, CKMB, Troponin: @LABRCNT (CKTOTAL:3, CKMB:3, TROPONINI:3)@    IMAGING:  Xr Chest Portable    Result Date: 11/6/2018  XR CHEST PORTABLE 11/6/2018 3:45 PM HISTORY: Shortness of breath COMPARISON: 9/13/2018. FINDINGS: Frontal view of the chest was obtained. Opacities in the left lower lobe are seen. Pulmonary vascularity is slightly increased. The cardiac silhouette remains enlarged. There is atherosclerosis in the aorta. The osseous structures and surrounding soft tissues demonstrate no acute abnormality. 1. Pulmonary vascular congestion and small left pleural effusion. Signed by Dr Elbert Vail on 11/6/2018 8:01 PM        Assessment:  1. Congestive heart failure improved  2. Chronic kidney disease with BUN 52 creatinine 2.4  3. Peripheral arterial disease severe  4. Anemia with hemoglobin 9.1  5. Diabetes  6. Hypertension  7. Superior mesenteric artery stenosis  8. Carotid artery stenosis  9. History of CVA    Plan:  1. Continue current treatment cardiac status improved  2.  Nursing staff related that daughter is interested in pursuing nursing home placement

## 2018-11-09 NOTE — PROGRESS NOTES
Progress Note  11/9/2018 6:21 AM  Subjective:   Admit Date: 11/6/2018  PCP: Naomi Pelayo MD    Interval History: \"I feel a little better. \"    DIET CARB CONTROL; No Caffeine    Intake/Output Summary (Last 24 hours) at 11/09/18 0621  Last data filed at 11/09/18 0455   Gross per 24 hour   Intake             1060 ml   Output             1475 ml   Net             -415 ml     Medications:      dextrose        vitamin D  50,000 Units Oral Weekly    calcitRIOL  0.25 mcg Oral Daily    ferrous sulfate  325 mg Oral BID WC    iron sucrose  200 mg Intravenous Daily    insulin lispro  0-12 Units Subcutaneous TID WC    insulin lispro  0-6 Units Subcutaneous Nightly    cefTRIAXone (ROCEPHIN) IV  1 g Intravenous Q24H    b complex-C-folic acid  1 capsule Oral Daily    sodium chloride flush  10 mL Intravenous 2 times per day    bumetanide  1 mg Intravenous Q12H    heparin (porcine)  5,000 Units Subcutaneous BID    influenza virus vaccine  0.5 mL Intramuscular Once    gabapentin  300 mg Oral Nightly    gabapentin  100 mg Oral Daily    carvedilol  12.5 mg Oral BID WC    amLODIPine  10 mg Oral Daily    pantoprazole  40 mg Oral BID AC    insulin glargine  20 Units Subcutaneous Nightly    clopidogrel  75 mg Oral Daily    cilostazol  100 mg Oral BID    allopurinol  100 mg Oral Daily    iron polysaccharides  150 mg Oral Daily     Recent Labs      11/06/18   1819   WBC  7.3   HGB  9.1*   PLT  336     Recent Labs      11/06/18   1819 11/08/18   1916  11/09/18   0236   NA  140  135*  137   K  4.0  3.7  3.4*   CL  100  94*  97*   CO2  23  25  24   BUN  52*  55*  58*   CREATININE  2.4*  2.5*  2.5*   GLUCOSE  137*  207*  112*     No results for input(s): AST, ALT, ALB, BILITOT, ALKPHOS in the last 72 hours.     Objective:   Vitals: /61   Pulse 85   Temp 98 °F (36.7 °C) (Temporal)   Resp 16   Ht 5' 4\" (1.626 m)   Wt 147 lb 4 oz (66.8 kg)   LMP 03/15/1970 (Approximate)   SpO2 91%   BMI 25.28 kg/m²   General

## 2018-11-10 NOTE — PROGRESS NOTES
Progress Note  11/10/2018 9:10 AM  Subjective:   Admit Date: 11/6/2018  PCP: Ramses Gonzalez MD    Interval History: \"I feel a little better. \"    DIET CARB CONTROL; Low Sodium (2 GM); No Caffeine    Intake/Output Summary (Last 24 hours) at 11/10/18 0910  Last data filed at 11/10/18 0841   Gross per 24 hour   Intake              730 ml   Output             1250 ml   Net             -520 ml     Medications:      dextrose        insulin glargine  15 Units Subcutaneous Nightly    potassium chloride  20 mEq Oral Daily    amoxicillin  500 mg Oral 2 times per day    vitamin D  50,000 Units Oral Weekly    calcitRIOL  0.25 mcg Oral Daily    ferrous sulfate  325 mg Oral BID WC    insulin lispro  0-12 Units Subcutaneous TID WC    insulin lispro  0-6 Units Subcutaneous Nightly    b complex-C-folic acid  1 capsule Oral Daily    sodium chloride flush  10 mL Intravenous 2 times per day    bumetanide  1 mg Intravenous Q12H    heparin (porcine)  5,000 Units Subcutaneous BID    influenza virus vaccine  0.5 mL Intramuscular Once    gabapentin  300 mg Oral Nightly    gabapentin  100 mg Oral Daily    carvedilol  12.5 mg Oral BID WC    amLODIPine  10 mg Oral Daily    pantoprazole  40 mg Oral BID AC    clopidogrel  75 mg Oral Daily    cilostazol  100 mg Oral BID    allopurinol  100 mg Oral Daily     No results for input(s): WBC, HGB, PLT in the last 72 hours. Recent Labs      11/08/18   1916  11/09/18   0236  11/10/18   0144   NA  135*  137  135*   K  3.7  3.4*  4.0   CL  94*  97*  93*   CO2  25  24  23   BUN  55*  58*  62*   CREATININE  2.5*  2.5*  3.1*   GLUCOSE  207*  112*  177*     No results for input(s): AST, ALT, ALB, BILITOT, ALKPHOS in the last 72 hours.     Objective:   Vitals: /70   Pulse 83   Temp 97.6 °F (36.4 °C) (Temporal)   Resp 16   Ht 5' 4\" (1.626 m)   Wt 146 lb 12.8 oz (66.6 kg)   LMP 03/15/1970 (Approximate)   SpO2 92%   BMI 25.20 kg/m²   General appearance: alert and cooperative

## 2018-11-10 NOTE — PROGRESS NOTES
last 72 hours. Invalid input(s): AMYLASE,  ALB  PT/INR: No results for input(s): PROTIME, INR in the last 72 hours. APTT: No results for input(s): APTT in the last 72 hours. BNP:  No results for input(s): BNP in the last 72 hours. Ionized Calcium:No results for input(s): IONCA in the last 72 hours. Magnesium:No results for input(s): MG in the last 72 hours. Phosphorus:No results for input(s): PHOS in the last 72 hours. HgbA1C: No results for input(s): LABA1C in the last 72 hours. Hepatic: No results for input(s): ALKPHOS, ALT, AST, PROT, BILITOT, BILIDIR, LABALBU in the last 72 hours. Lactic Acid: No results for input(s): LACTA in the last 72 hours. Troponin: No results for input(s): CKTOTAL, CKMB, TROPONINT in the last 72 hours. ABGs: No results for input(s): PH, PCO2, PO2, HCO3, O2SAT in the last 72 hours. CRP:  No results for input(s): CRP in the last 72 hours. Sed Rate:  No results for input(s): SEDRATE in the last 72 hours. Cultures:   No results for input(s): CULTURE in the last 72 hours. Radiology reports as per the Radiologist  Radiology: Xr Chest Portable    Result Date: 11/6/2018  XR CHEST PORTABLE 11/6/2018 3:45 PM HISTORY: Shortness of breath COMPARISON: 9/13/2018. FINDINGS: Frontal view of the chest was obtained. Opacities in the left lower lobe are seen. Pulmonary vascularity is slightly increased. The cardiac silhouette remains enlarged. There is atherosclerosis in the aorta. The osseous structures and surrounding soft tissues demonstrate no acute abnormality. 1. Pulmonary vascular congestion and small left pleural effusion. Signed by Dr Shy Adams on 11/6/2018 8:01 PM       Assessment     -CKD stage 4 with superimposed BALJINDER (likely related to cardiorenal syndrome)  -Type 2 DM with renal disease  -Benign HTN  -CHF (acute/chronic sytolic-diatsolic)  -Dyspnea  -Anemia of CKD with iron def  -UTI  -Sec HPTH  -Low vit D    Plan:   Will decrease amlodipine to 5 mg daily to prevent

## 2018-11-10 NOTE — DISCHARGE SUMMARY
metolazone (ZAROXOLYN) 2.5 MG tablet Take 1 tablet by mouth as needed (only use up to twice a week)  Qty: 15 tablet, Refills: 0    Associated Diagnoses: Chronic combined systolic (congestive) and diastolic (congestive) heart failure ([facility] [address] Superior mesenteric artery stenosis ([facility] [address] Internal carotid artery stenosis, bilateral; Essential hypertension; Cerebrovascular accident (CVA), unspecified mechanism ([facility] [address] Atherosclerosis of native artery of both lower extremities with intermittent claudication (HCC)      cilostazol (PLETAL) 100 MG tablet Take 1 tablet by mouth 2 times daily  Qty: 60 tablet, Refills: 3      iron polysaccharides (NIFEREX) 150 MG capsule Take 1 capsule by mouth daily  Qty: 30 capsule, Refills: 5      allopurinol (ZYLOPRIM) 100 MG tablet Take 1 tablet by mouth daily  Qty: 30 tablet, Refills: 3      amLODIPine (NORVASC) 10 MG tablet Take 1 tablet by mouth daily  Qty: 30 tablet, Refills: 3      clopidogrel (PLAVIX) 75 MG tablet Take 1 tablet by mouth daily  Qty: 30 tablet, Refills: 3      pantoprazole (PROTONIX) 40 MG tablet Take 1 tablet by mouth 2 times daily (before meals)  Qty: 30 tablet, Refills: 3      meclizine (ANTIVERT) 25 MG tablet Take 25 mg by mouth 3 times daily as needed (took at 1100 today)       HYDROcodone-acetaminophen (NORCO) 7.5-325 MG per tablet Take 1 tablet by mouth every 6 hours as needed for Pain . Qty: 15 tablet, Refills: 0               Condition At Discharge:  Improved    Disposition:      1. Home  2. Follow up with cardiology as arranged  3.  Follow up with primary care provider as arranged      Electronically signed by Tremayne Cuello MD on 11/10/18

## 2018-11-11 NOTE — PROGRESS NOTES
Nephrology ([id number]) Progress Note    Patient:  Holden Ring  YOB: 1940  Date of Service: 11/11/2018  MRN: [id number]   Acct: [de-identified]   Primary Care Physician: Martha Mcintosh MD  Advance Directive: Full Code  Admit Date: 11/6/2018       Hospital Day: 5  Referring Provider: Marcellus Reyna MD    Patient Seen, Chart, Consults notes, Labs, Radiology studies reviewed. Subjective:  Patient is a 70 yo pleasant woman who has type 2 DM, CHF (systolo-diastolic), HTN, CKD stage 4, followed at the renal clinic. Her latest GFR was 26 when last seen in Oct 2018. She was having increasing dyspnea and was seen by cardiology who recommended hospital admission for CHF exacerbation. Patient is not a good historian but denied dysuria. In fact, she was found with UTI. She has been incontinent. On admission, her serum creatinine was 2.4 and her GFR was 20. Renal service was consulted to manage her CKD stage 4. She still has a Montano catheter. Her urine output has dropped. Patient offers no new complaints. Dyspnea is stable.       Allergies:  Aspirin and Codeine    Medicines:  Current Facility-Administered Medications   Medication Dose Route Frequency Provider Last Rate Last Dose    insulin glargine (LANTUS) injection vial 15 Units  15 Units Subcutaneous Nightly Martha Mcintosh MD        [facility]ide [facility]) tablet 2 mg  2 mg Oral Daily Marga Benavides MD   2 mg at 11/11/18 0824    amLODIPine (NORVASC) tablet 5 mg  5 mg Oral Daily Marga Benavides MD   5 mg at 11/11/18 0824    potassium chloride 20 MEQ/15ML (10%) oral solution 20 mEq  20 mEq Oral Daily Martha Mcintosh MD   20 mEq at 11/11/18 1175    amoxicillin (AMOXIL) capsule 500 mg  500 mg Oral 2 times per day Martha Mcintosh MD   500 mg at 11/11/18 4054    vitamin D (ERGOCALCIFEROL) capsule 50,000 Units  50,000 Units Oral Weekly Marga Benavides MD   50,000 Units at 11/08/18 0912    calcitRIOL (ROCALTROL) capsule at 11/10/18 2017    gabapentin (NEURONTIN) capsule 100 mg  100 mg Oral Daily Tiera Howe MD   100 mg at 11/11/18 1863    carvedilol (COREG) tablet 12.5 mg  12.5 mg Oral BID WC Tiera Howe MD   12.5 mg at 11/11/18 0824    pantoprazole (PROTONIX) tablet 40 mg  40 mg Oral BID AC Tiera Howe MD   40 mg at 11/11/18 0603    clopidogrel (PLAVIX) tablet 75 mg  75 mg Oral Daily Tiera Howe MD   75 mg at 11/11/18 0825    cilostazol (PLETAL) tablet 100 mg  100 mg Oral BID Tiera Howe MD   100 mg at 11/11/18 0110    allopurinol (ZYLOPRIM) tablet 100 mg  100 mg Oral Daily Tiera Howe MD   100 mg at 11/11/18 0825       Past Medical History:  Past Medical History:   Diagnosis Date    Arthritis     Atherosclerosis of native arteries of the extremities with intermittent claudication 9/22/2014    Cerebral artery occlusion with cerebral infarction [facility])     CHF (congestive heart failure) ([address]     Diabetes mellitus [facility] [address]     Diabetic vasculopathy ([address]     Hypertension     Kidney stone     Peripheral vascular disease ([address]     Superior mesenteric artery stenosis ([facility] [address] 9/23/2014       Past Surgical History:  Past Surgical History:   Procedure Laterality Date    APPENDECTOMY      CHOLECYSTECTOMY      COLONOSCOPY      FINGER TRIGGER RELEASE Right     HYSTERECTOMY  40 YEARS PRIOR    PARTIAL    KNEE SURGERY Right     TONSILLECTOMY         Family History  Family History   Problem Relation Age of Onset    Hypertension Father     Diabetes Father     Heart Failure Father     Stroke Mother     Cancer Mother     Diabetes Mother     Seizures Child        Social History  Social History     Social History    Marital status:      Spouse name: N/A    Number of children: 4    Years of education: 12     Occupational History    Not on file.      Social History Main Topics    Smoking status: Former Smoker     Packs/day: 0.50     Quit date: 9/10/2018   Lindajo Bence

## 2018-11-12 NOTE — PROGRESS NOTES
[id number]    acetaminophen (TYLENOL) tablet 650 mg  650 mg Oral Q4H PRN Reina Stewart MD   650 mg at 11/12/18 1010    glucose (GLUTOSE) 40 % oral gel 15 g  15 g Oral PRN Peter Peralta MD        dextrose 50 % solution 12.5 g  12.5 g Intravenous PRN Peter Peralta MD        glucagon (rDNA) injection 1 mg  1 mg Intramuscular PRN Peter Peralta MD        dextrose 5 % solution  100 mL/hr Intravenous PRN Peter Peralta MD        insulin lispro (HUMALOG) injection vial 0-12 Units  0-12 Units Subcutaneous TID WC Peter Peralta MD   4 Units at 11/12/18 0916    insulin lispro (HUMALOG) injection vial 0-6 Units  0-6 Units Subcutaneous Nightly Peter Peralta MD   2 Units at 11/11/18 2125    b complex-C-folic acid (NEPHROCAPS) capsule 1 mg  1 capsule Oral Daily Deny Salter MD   1 mg at 11/12/18 0913    sodium chloride flush 0.9 % injection 10 mL  10 mL Intravenous 2 times per day FRAN Sharp   10 mL at 11/12/18 0913    sodium chloride flush 0.9 % injection 10 mL  10 mL Intravenous PRN FRAN Sharp        magnesium hydroxide (MILK OF MAGNESIA) 400 MG/5ML suspension 30 mL  30 mL Oral Daily PRN FRAN Sharp        ondansetron [facility]) injection 4 mg  4 mg Intravenous Q6H PRN Liborio Minor, APRN        ipratropium-albuterol (DUONEB) nebulizer solution 1 ampule  1 ampule Inhalation Q4H PRN FRAN Sharp   1 ampule at 11/11/18 1308    heparin (porcine) injection 5,000 Units  5,000 Units Subcutaneous BID Liborio Minor APRN   5,000 Units at 11/12/18 3591    influenza quadrivalent split vaccine (FLUZONE;FLUARIX;FLULAVAL;AFLURIA) injection 0.5 mL  0.5 mL Intramuscular Once Reina Stewart MD        gabapentin (NEURONTIN) capsule 300 mg  300 mg Oral Nightly Reina Stewart MD   300 mg at 11/11/18 2109    gabapentin (NEURONTIN) capsule 100 mg  100 mg Oral Daily Reina tSewart MD   100 mg at 11/12/18 0913    carvedilol (COREG)

## 2018-11-12 NOTE — PROGRESS NOTES
HCT, MCV, PLT in the last 72 hours. BMP: Recent Labs      11/10/18   0144  11/11/18   0143  11/12/18   0151   NA  135*  133*  130*   K  4.0  4.3  5.1*   CL  93*  92*  91*   CO2  23  23  21*   BUN  62*  72*  84*   CREATININE  3.1*  4.1*  4.6*     LIVER PROFILE: No results for input(s): AST, ALT, LIPASE, BILIDIR, BILITOT, ALKPHOS in the last 72 hours. Invalid input(s): AMYLASE,  ALB  PT/INR: No results for input(s): PROTIME, INR in the last 72 hours. APTT: No results for input(s): APTT in the last 72 hours. BNP:  No results for input(s): BNP in the last 72 hours. CK, CKMB, Troponin: @LABRCNT (CKTOTAL:3, CKMB:3, TROPONINI:3)@    IMAGING:  Us Renal Complete    Result Date: 11/11/2018  EXAMINATION:  US RENAL COMPLETE  11/11/2018 12:17 PM HISTORY: Renal failure, acute. High blood pressure. COMPARISON: 7/6/2017. TECHNIQUE: Grayscale and color flow imaging were performed. FINDINGS: There is a 5 mm renal stone in the right mid kidney. The right kidney measures 10.3 cm. The cortical thickness and echogenicity are normal. There is no hydronephrosis of the right kidney. The left kidney was obscured by bowel gas and could not be visualized for evaluation. The urinary bladder is decompressed and not well evaluated. 1. The left kidney was obscured by bowel gas and could not be evaluated. 2. The right kidney is normal in size with normal cortical thickness and echogenicity and no hydronephrosis. There is a 5 mm renal stone in the right mid kidney. Signed by Dr Meron Alas on 11/11/2018 12:19 PM    Xr Chest Portable    Result Date: 11/6/2018  XR CHEST PORTABLE 11/6/2018 3:45 PM HISTORY: Shortness of breath COMPARISON: 9/13/2018. FINDINGS: Frontal view of the chest was obtained. Opacities in the left lower lobe are seen. Pulmonary vascularity is slightly increased. The cardiac silhouette remains enlarged. There is atherosclerosis in the aorta.  The osseous structures and surrounding soft tissues demonstrate no acute abnormality. 1. Pulmonary vascular congestion and small left pleural effusion. Signed by Dr Patel Spearing on 11/6/2018 8:01 PM        Assessment:  1. Congestive heart failure improved  2. Chronic kidney disease with BUN 52 creatinine 2.4  3. Peripheral arterial disease severe  4. Anemia with hemoglobin 9.1  5. Diabetes  6. Hypertension  7. Superior mesenteric artery stenosis  8. Carotid artery stenosis  9. History of CVA    Plan:  1.  Continue treatment cardiac status appears stable monitor closely    Wei Man MD 11/12/2018 1:27 PM

## 2018-11-13 NOTE — PROGRESS NOTES
Palliative Care: Met with pt to initiate Palliative Care, Pt says she had a cough and was unable to walk. Past Medical History:        Past Medical History:   Diagnosis Date    Arthritis     Atherosclerosis of native arteries of the extremities with intermittent claudication 9/22/2014    [facility]bral [facility] occlusion [facility] infarction [facility]     CHF (congestive heart failure) (HCC)     Diabetes mellitus (HCC)     Diabetic vasculopathy (HCC)     Hypertension     Kidney stone     Peripheral vascular disease ([facility]     Superior mesenteric artery stenosis ([facility] 9/23/2014       Advance Directives:    Pt says she does not have a AD/LW but her daughter Joanie Clemens, is her POA. Pain/Other Symptoms:    Pt says she is having pain but is being given pain medication. Activity:     Pt to return to activity as tolerated. Psychological/Spiritual:     [facility].        Patient/Family Discussion:      Pt she has two daughters, two sons, grandchildren, and great-grandchildren. Plan:   Plans to go to rehab. Patients goal, what they hope for:   Pt says she wants to go back to her own home and take care of her intellectually disabled daughter. Provided spiritual care with sustaining presence, nurtured hope, and prayer. Pt expressed gratitude for spiritual care.          Electronically signed by Ruben Altamirano on 11/13/2018 at 2:44 PM

## 2018-11-13 NOTE — PROGRESS NOTES
Sabiha Phillip is a 69 y.o. female patient with diabetes, CHF (systolic and diastolic), HTN, CKD stage 4 who is currently admitted with dyspnea/volume overload/ and edema. This patient has been seen at [id number] [facility] in October, 2014 by Debbrah Goltz for PVD of her LE's with complaints of claudication. Patient was scheduled for left femoral artery exposure and attempt to cross  iliac system retrograde, with PTA/stent if able to cross, vs R to L femfem bypass, and if vein suitable, plan for LLE fempop bypass with plans for a RLE fempop bypass as a separate procedure. Patient called to cancel this procedure and was going to reschedule. Patient seen again in February, 2016 for PVD and claudication, did not return for follow up with . Vascular consulted to place tunneled hemodialysis catheter in this patient.      Current Facility-Administered Medications   Medication Dose Route Frequency Provider Last Rate Last Dose    insulin glargine (LANTUS) injection vial 15 Units  15 Units Subcutaneous Nightly Angel Lopez MD   15 Units at 11/12/18 2100    amLODIPine (NORVASC) tablet 5 mg  5 mg Oral Daily Yanick Huang MD   5 mg at 11/13/18 0910    potassium chloride 20 MEQ/15ML (10%) oral solution 20 mEq  20 mEq Oral Daily Angel Lopez MD   20 mEq at 11/13/18 3491    amoxicillin (AMOXIL) capsule 500 mg  500 mg Oral 2 times per day Angel Lopez MD   500 mg at 11/13/18 4094    vitamin D (ERGOCALCIFEROL) capsule 50,000 Units  50,000 Units Oral Weekly Yanick Huang MD   50,000 Units at 11/08/18 0912    calcitRIOL (ROCALTROL) capsule 0.25 mcg  0.25 mcg Oral Daily Yanick Huang MD   0.25 mcg at 11/13/18 7566    ferrous sulfate tablet 325 mg  325 mg Oral BID WC Yanick Huang MD   325 mg at 11/13/18 0910    acetaminophen (TYLENOL) tablet 650 mg  650 mg Oral Q4H PRN Drake Romero MD   650 mg at 11/13/18 0910    glucose (GLUTOSE) 40 % oral gel 15 g  15 g Oral PRN the phone at 4:50 this afternoon. Kathleen Sullivan had talked with patients nurse and charge nurse and was upset that we could not give her a specific time for procedure. I explained she was an add-on, and we would get the catheter placed as soon as possible . She had multiple questions regarding the procedure,sedation, and dialysis treatments. She voices understanding and is agreeable to proceed with placement of catheter. Agree with above. I examined the patient.     11/13/2018

## 2018-11-13 NOTE — PROGRESS NOTES
Cardiology Daily Note Missael Keene MD      Patient:  Sowmya Arroyo  [id number]    Patient Active Problem List    Diagnosis Date Noted    Acute on chronic combined systolic and diastolic CHF (congestive heart failure) ([address] 11/06/2018     Priority: Low    Chronic combined systolic (congestive) and diastolic (congestive) heart failure ([address] 09/27/2018     Priority: Low    Internal carotid artery stenosis, bilateral 09/27/2018     Priority: Low    Hypoglycemia 09/13/2018     Priority: Low    Skin ulcer of second toe of left foot with fat layer exposed ([address] 08/09/2016     Priority: Low    Nonhealing ulcer of right lower leg limited to breakdown of skin ([address] 08/02/2016     Priority: Low    Diabetic ulcer of left foot associated with diabetes mellitus due to underlying condition ([address] 08/02/2016     Priority: Low    Atherosclerosis of native artery of both lower extremities with intermittent claudication (HCC)      Priority: Low    CVA (cerebral vascular accident) ([address] 03/13/2016     Priority: Low    Carotid bruit 03/13/2016     Priority: Low    Ataxia      Priority: Low    Superior mesenteric artery stenosis ([address] 09/23/2014     Priority: Low    Atherosclerosis of native artery of extremity with intermittent claudication ([address] 09/22/2014     Priority: Low    Diabetes mellitus ([address]      Priority: Low    Diabetic vasculopathy ([address]      Priority: Low    Hypertension      Priority: Low    Chronic kidney disease 11/01/2018       Admit Date:  11/6/2018    Admission Problem List: Present on Admission:   Acute on chronic combined systolic and diastolic CHF (congestive heart failure) [facility])      Cardiac Specific Data:  Specialty Problems        Cardiology Problems    Hypertension        Superior mesenteric artery stenosis (HCC)        CVA (cerebral vascular accident) ([address]        Atherosclerosis of native artery of both lower extremities with intermittent claudication (HCC)        Chronic combined

## 2018-11-13 NOTE — PROGRESS NOTES
MD   0.25 mcg at 11/13/18 0759    ferrous sulfate tablet 325 mg  325 mg Oral BID  Josef Henry MD   325 mg at 11/13/18 0910    acetaminophen (TYLENOL) tablet 650 mg  650 mg Oral Q4H PRN Janine Lee MD   650 mg at 11/13/18 0910    glucose (GLUTOSE) 40 % oral gel 15 g  15 g Oral PRN Efren Buchanan MD        dextrose 50 % solution 12.5 g  12.5 g Intravenous PRN Efren Buchanan MD        glucagon (rDNA) injection 1 mg  1 mg Intramuscular PRN Efren Buchanan MD        dextrose 5 % solution  100 mL/hr Intravenous PRN Efern Buchanan MD        insulin lispro (HUMALOG) injection vial 0-12 Units  0-12 Units Subcutaneous TID  Efren Buchanan MD   6 Units at 11/12/18 1723    insulin lispro (HUMALOG) injection vial 0-6 Units  0-6 Units Subcutaneous Nightly Efren Buchanan MD   2 Units at 11/12/18 2100    b complex-C-folic acid (NEPHROCAPS) capsule 1 mg  1 capsule Oral Daily Josef Henry MD   1 mg at 11/13/18 0910    sodium chloride flush 0.9 % injection 10 mL  10 mL Intravenous 2 times per day Arturo Bunting, APRN   10 mL at 11/12/18 0913    sodium chloride flush 0.9 % injection 10 mL  10 mL Intravenous PRN Arturo Bunting, APRN        magnesium hydroxide (MILK OF MAGNESIA) 400 MG/5ML suspension 30 mL  30 mL Oral Daily PRN Arturo Bunting, APRN        [facility]ron [facility]) injection 4 mg  4 mg Intravenous Q6H PRN Arturo Bunting, APRN        ipratropium-albuterol (DUONEB) nebulizer solution 1 ampule  1 ampule Inhalation Q4H PRN Arturo Bunting, APRN   1 ampule at 11/11/18 1308    heparin (porcine) injection 5,000 Units  5,000 Units Subcutaneous BID Arturo Bunting, APRN   5,000 Units at 11/13/18 9950    influenza quadrivalent split vaccine (FLUZONE;FLUARIX;FLULAVAL;AFLURIA) injection 0.5 mL  0.5 mL Intramuscular Once Janine Lee MD        gabapentin (NEURONTIN) capsule 300 mg  300 mg Oral Nightly Janine Lee MD   300 mg at 11/12/18 2050    User    Alcohol use No    Drug use: No    Sexual activity: Not on file     Other Topics Concern    Not on file     Social History Narrative    Born [place]     8 yrs ago     once    Has 2 sons and 2 daughters    Worked at [employer] 27 yrs     Education [employer] 1/2 ppd    Denies alcohol consumption or substance usage    Physically sedentary    Quit driving 4/2/1637    Walks with walker         Review of Systems:  History obtained from chart review and the patient  General ROS: No fever or chills  Respiratory ROS: No cough, +shortness of breath,- wheezing  Cardiovascular ROS: no chest pain but dyspnea on exertion  Gastrointestinal ROS: No abdominal pain or melena  Genito-Urinary ROS: No dysuria or hematuria  Musculoskeletal ROS: No joint pain but leg swelling         Objective:  Blood pressure 136/68, pulse 81, temperature 98 °F (36.7 °C), temperature source Temporal, resp. rate 18, height 5' 4\" (1.626 m), weight 145 lb 9.6 oz (66 kg), last menstrual period 03/15/1970, SpO2 90 %, not currently breastfeeding. Intake/Output Summary (Last 24 hours) at 11/13/18 1107  Last data filed at 11/13/18 9505   Gross per 24 hour   Intake             1340 ml   Output              750 ml   Net              590 ml     General: alert and oriented x3   HEENT: Normocephalic atraumatic head  Neck: Supple with no JVD or carotid bruits.   Chest:  clear to auscultation bilaterally without respiratory distress  CVS: regular rate and rhythm  Abdominal: soft, nontender, normal bowel sounds  Extremities: no cyanosis but trace leg edema  Skin: warm and dry without rash    Labs:  BMP:   Recent Labs      11/11/18   0143  11/12/18   0151  11/13/18   0130   NA  133*  130*  131*   K  4.3  5.1*  4.8   CL  92*  91*  93*   CO2  23  21*  20*   BUN  72*  84*  90*   CREATININE  4.1*  4.6*  4.4*   CALCIUM  8.7*  8.3*  8.4*     CBC:   Recent Labs      11/13/18   0130   WBC  8.0   HGB  8.1*   HCT

## 2018-11-14 NOTE — PROCEDURES
Dc Dorado is a 69 y.o. female patient. No diagnosis found. Past Medical History:   Diagnosis Date    Arthritis     Atherosclerosis of native arteries of the extremities with intermittent claudication 9/22/2014    Cerebral artery occlusion with cerebral infarction [facility])     CHF (congestive heart failure) (HCC)     Diabetes mellitus [facility]     Diabetic vasculopathy ([facility]     Hypertension     Kidney stone     Peripheral vascular disease ([facility]     Superior mesenteric artery stenosis ([facility] [facility] 9/23/2014     Blood pressure 130/70, pulse 71, temperature 97 °F (36.1 °C), resp. rate 16, height 5' 4\" (1.626 m), weight 149 lb 1.6 oz (67.6 kg), last menstrual period 03/15/1970, SpO2 92 %, not currently breastfeeding. Central Line  Date/Time: 11/14/2018 2:25 PM  Performed by: Chay Pate by: Torres Fisher   Consent: The procedure was performed in an emergent situation.   Required items: required blood products, implants, devices, and special equipment available  Patient identity confirmed: arm band  Indications: vascular access    Sedation:  Patient sedated: no  Preparation: skin prepped with Betadine  Location details: right femoral  Patient position: flat  Catheter type: triple lumen  Catheter size: 7 Fr  Ultrasound guidance: no  Number of attempts: 1  Successful placement: yes  Post-procedure: line sutured and dressing applied  Assessment: free fluid flow and blood return through all ports          Archie Sood MD  11/14/2018

## 2018-11-14 NOTE — PROGRESS NOTES
systolic (congestive) and diastolic (congestive) heart failure (HCC)        Internal carotid artery stenosis, bilateral        Acute on chronic combined systolic and diastolic CHF (congestive heart failure) ([facility]              Subjective:  Ms. Wu Brought seen today developed cardiac arrest shortly after starting dialysis earlier today. I was in a procedure at the time and of just finished seeing her. There is no reported chest pain. Now intubated in the coronary care unit. On amiodarone and dopamine and epinephrine drips. Potassium level IV. 6. Troponin 0.04. EKG pending. Last EKG on 11/12/18 showed  ms. Objective:   /70   Pulse 71   Temp 97 °F (36.1 °C)   Resp 16   Ht 5' 4\" (1.626 m)   Wt 149 lb 1.6 oz (67.6 kg)   LMP 03/15/1970 (Approximate)   SpO2 92%   BMI 25.59 kg/m²       Intake/Output Summary (Last 24 hours) at 11/14/18 1547  Last data filed at 11/14/18 0505   Gross per 24 hour   Intake              790 ml   Output             1150 ml   Net             -360 ml       Prior to Admission medications    Medication Sig Start Date End Date Taking? Authorizing Provider   Dextromethorphan-Guaifenesin (DIABETIC TUSSIN DM PO) Take by mouth 2 times daily   Yes Historical Provider, MD   gabapentin (NEURONTIN) 100 MG capsule Take 100 mg by mouth 2 times daily. Take 1 100mg tablet in AM and 3 100mg tablets at bedtime .    Yes Historical Provider, MD   insulin glargine (BASAGLAR KWIKPEN) 100 UNIT/ML injection pen Inject 20 Units into the skin nightly   Yes Historical Provider, MD   bumetanide (BUMEX) 1 MG tablet Take 2 mg by mouth daily   Yes Historical Provider, MD   carvedilol (COREG) 12.5 MG tablet Take 1 tablet by mouth 2 times daily (with meals) 11/1/18  Yes Janine Lee MD   metolazone (ZAROXOLYN) 2.5 MG tablet Take 1 tablet by mouth as needed (only use up to twice a week) 11/1/18  Yes Janine eLe MD   cilostazol (PLETAL) 100 MG tablet Take 1 tablet by mouth 2 times daily 9/19/18

## 2018-11-14 NOTE — PROGRESS NOTES
NGT placed in R nare, 18Fr at 75cm line. Placement checked with air bolus, placed to bedside suction intermittent, yellow opaque secretions returned. Will get Xray for placement.  Electronically signed by Roney Pelayo RN on 11/14/2018 at 4:12 PM

## 2018-11-14 NOTE — PROGRESS NOTES
MD Abby   300 mg at 11/13/18 2142    gabapentin (NEURONTIN) capsule 100 mg  100 mg Oral Daily Amee Aguilar MD   100 mg at 11/14/18 0831    carvedilol (COREG) tablet 12.5 mg  12.5 mg Oral BID WC Amee Aguilar MD   12.5 mg at 11/14/18 0831    pantoprazole (PROTONIX) tablet 40 mg  40 mg Oral BID AC Amee Aguilar MD   40 mg at 11/14/18 2445    clopidogrel (PLAVIX) tablet 75 mg  75 mg Oral Daily Amee Aguilar MD   75 mg at 11/14/18 0831    cilostazol (PLETAL) tablet 100 mg  100 mg Oral BID Amee Aguilar MD   100 mg at 11/14/18 0831    allopurinol (ZYLOPRIM) tablet 100 mg  100 mg Oral Daily Amee Aguilar MD   100 mg at 11/14/18 0831       Past Medical History:  Past Medical History:   Diagnosis Date    Arthritis     Atherosclerosis of native arteries of the extremities with intermittent claudication 9/22/2014    Cerebral artery occlusion with cerebral infarction [facility])     [facility]congest[facility]) [facility]     Diabetes mellitus ([facility]     Diabetic vasculopathy ([facility]     Hypertension     Kidney stone     Peripheral vascular disease ([facility]     Superior mesenteric artery stenosis ([facility] 9/23/2014       Past Surgical History:  Past Surgical History:   Procedure Laterality Date    APPENDECTOMY      CHOLECYSTECTOMY      COLONOSCOPY      FINGER TRIGGER RELEASE Right     HYSTERECTOMY  40 YEARS PRIOR    PARTIAL    KNEE SURGERY Right     TONSILLECTOMY         Family History  Family History   Problem Relation Age of Onset    Hypertension Father     Diabetes Father     Heart Failure Father     Stroke Mother     Cancer Mother     Diabetes Mother     Seizures Child        Social History  Social History     Social History    Marital status:      Spouse name: N/A    Number of children: 4    Years of education: 12     Occupational History    Not on file.      Social History Main Topics    Smoking status: Former Smoker     Packs/day: 0.50     Quit

## 2018-11-15 NOTE — PROGRESS NOTES
systolic (congestive) and diastolic (congestive) heart failure (HCC)        Internal carotid artery stenosis, bilateral        Acute on chronic combined systolic and diastolic CHF (congestive heart failure) ([facility] [address]              Subjective:  Ms. Aislinn Ervin seen today remains intubated no reported chest pain. On dopamine and epinephrine drips. Cardiac arrest yesterday during dialysis no obvious etiology polymorphic tachycardia on the initial rhythm strips required prolonged CPR and several countershocks I'm told at least 3. Troponin is gone up to about 0.96 but this would not be unexpected following electrical cardioversion. Objective:   BP (!) 107/47   Pulse 66   Temp 98.7 °F (37.1 °C) (Temporal)   Resp 19   Ht 5' 4\" (1.626 m)   Wt 156 lb (70.8 kg)   LMP 03/15/1970 (Approximate)   SpO2 97%   BMI 26.78 kg/m²       Intake/Output Summary (Last 24 hours) at 11/15/18 0811  Last data filed at 11/15/18 0600   Gross per 24 hour   Intake          1839.46 ml   Output             1097 ml   Net           742.46 ml       Prior to Admission medications    Medication Sig Start Date End Date Taking? Authorizing Provider   Dextromethorphan-Guaifenesin (DIABETIC TUSSIN DM PO) Take by mouth 2 times daily   Yes Historical Provider, MD   gabapentin (NEURONTIN) 100 MG capsule Take 100 mg by mouth 2 times daily. Take 1 100mg tablet in AM and 3 100mg tablets at bedtime .    Yes Historical Provider, MD   insulin glargine (BASAGLAR KWIKPEN) 100 UNIT/ML injection pen Inject 20 Units into the skin nightly   Yes Historical Provider, MD   bumetanide (BUMEX) 1 MG tablet Take 2 mg by mouth daily   Yes Historical Provider, MD   carvedilol (COREG) 12.5 MG tablet Take 1 tablet by mouth 2 times daily (with meals) 11/1/18  Yes Christiano Benson MD   metolazone (ZAROXOLYN) 2.5 MG tablet Take 1 tablet by mouth as needed (only use up to twice a week) 11/1/18  Yes Christiano Benson MD   cilostazol (PLETAL) 100 MG tablet Take 1 tablet by mouth 2 times daily 9/19/18  Yes Jorge Stoll MD   iron polysaccharides (NIFEREX) 150 MG capsule Take 1 capsule by mouth daily 9/19/18  Yes Jorge Stoll MD   allopurinol (ZYLOPRIM) 100 MG tablet Take 1 tablet by mouth daily 9/19/18  Yes Jorge Stoll MD   amLODIPine (NORVASC) 10 MG tablet Take 1 tablet by mouth daily 9/19/18  Yes Jorge Stoll MD   clopidogrel (PLAVIX) 75 MG tablet Take 1 tablet by mouth daily 3/30/16  Yes Abhijeet Pittman MD   pantoprazole (PROTONIX) 40 MG tablet Take 1 tablet by mouth 2 times daily (before meals) 3/30/16  Yes Abhijeet Pittman MD   meclizine (ANTIVERT) 25 MG tablet Take 25 mg by mouth 3 times daily as needed (took at 1100 today)    Yes Aquiles Joiner MD   HYDROcodone-acetaminophen (NORCO) 7.5-325 MG per tablet Take 1 tablet by mouth every 6 hours as needed for Pain .  2/14/17   Tracey House MD        pantoprazole  40 mg Intravenous BID    And    sodium chloride (PF)  10 mL Intravenous Daily    heparin (porcine)  5,000 Units Subcutaneous BID    insulin glargine  15 Units Subcutaneous Nightly    amLODIPine  5 mg Oral Daily    potassium chloride  20 mEq Oral Daily    amoxicillin  500 mg Oral 2 times per day    vitamin D  50,000 Units Oral Weekly    calcitRIOL  0.25 mcg Oral Daily    ferrous sulfate  325 mg Oral BID WC    insulin lispro  0-12 Units Subcutaneous TID WC    insulin lispro  0-6 Units Subcutaneous Nightly    b complex-C-folic acid  1 capsule Oral Daily    sodium chloride flush  10 mL Intravenous 2 times per day    influenza virus vaccine  0.5 mL Intramuscular Once    gabapentin  300 mg Oral Nightly    gabapentin  100 mg Oral Daily    carvedilol  12.5 mg Oral BID WC    clopidogrel  75 mg Oral Daily    cilostazol  100 mg Oral BID    allopurinol  100 mg Oral Daily       TELEMETRY: Sinus     Physical Exam:      Physical Exam      General:  Awake, alert, NAD  Skin:  Warm and dry  Neck:  no jvd , no carotid bruits  Chest:  Clear to auscultation,

## 2018-11-15 NOTE — PROGRESS NOTES
BP 71/41 Dopamine gtt increased to 15mcg/kg/min. Will monitor and titrate per orders.  Electronically signed by Radha Schmitt RN on 11/14/2018 at 9:40 PM

## 2018-11-15 NOTE — PROGRESS NOTES
11/15/18 1150   Subjective   Subjective No TX per Nsg at this time patient not medically stable. Patient   survived 40 min code yesterday during dialysis, her first HD [address].   Patient now intubated   Physical Therapy    Electronically signed by Clarke Beauchamp PTA on 11/15/2018 at 11:53 AM

## 2018-11-15 NOTE — PROGRESS NOTES
Nephrology ([facility]) Progress Note    Patient:  Enzo Farmer  YOB: 1940  Date of Service: 11/15/2018  MRN: [id number]   Acct: [de-identified]   Primary Care Physician: Perry Flores MD  Advance Directive: Full Code  Admit Date: 11/6/2018       Hospital Day: 9  Referring Provider: Donna Wisdom MD    Patient Seen, Chart, Consults notes, Labs, Radiology studies reviewed. Subjective:  Patient is a 70 yo pleasant woman who has type 2 DM, CHF (systolo-diastolic), HTN, CKD stage 4, followed at the renal clinic. Her latest GFR was 26 when last seen in Oct 2018. She was having increasing dyspnea and was seen by cardiology who recommended hospital admission for CHF exacerbation. Hospital course remarkable for treatment with diuretics intravenously leading to worsening of renal function. After long discussion with patient, she agrees to proceed with dialysis. She has baseline stage IV chronic kidney disease with GFR of 19 and her renal function continued to get worse. On November 14. Patient underwent insertion of permacath followed by 1st dialysis treatment. Few minutes into treatment patient had cardiopulmonary arrest and was successfully resuscitated. She is currently intubated in CCU. She is fully alert and awake and responding appropriately.     Allergies:  Aspirin and Codeine    Medicines:  Current Facility-Administered Medications   Medication Dose Route Frequency Provider Last Rate Last Dose    DOPamine (INTROPIN) 400 mg in dextrose 5 % 250 mL infusion  10 mcg/kg/min Intravenous Continuous Harlan Looney MD 50.7 mL/hr at 11/15/18 0456 20 mcg/kg/min at 11/15/18 0456    EPINEPHrine (EPINEPHrine HCL) 5 mg in dextrose 5 % 250 mL infusion  1 mcg/min Intravenous Continuous Grover Morales MD 12 mL/hr at 11/15/18 0120 4 mcg/min at 11/15/18 0120    morphine injection 2 mg  2 mg Intravenous Q2H PRN Donna Wisdom MD   2 mg at 11/15/18 0708    morphine (PF) injection Chris Virgen MD        glucagon (rDNA) injection 1 mg  1 mg Intramuscular PRN Shaw Alberto MD        dextrose 5 % solution  100 mL/hr Intravenous PRN Shaw Alberto MD        insulin lispro (HUMALOG) injection vial 0-12 Units  0-12 Units Subcutaneous TID  Shaw Alberto MD   6 Units at 11/13/18 1656    insulin lispro (HUMALOG) injection vial 0-6 Units  0-6 Units Subcutaneous Nightly Shaw Alberto MD   3 Units at 11/14/18 2038    b complex-C-folic acid (NEPHROCAPS) capsule 1 mg  1 capsule Oral Daily Ronan Howell MD   1 mg at 11/14/18 0831    sodium chloride flush 0.9 % injection 10 mL  10 mL Intravenous 2 times per day Elveria Valerioes, APRN   10 mL at 11/14/18 2043    sodium chloride flush 0.9 % injection 10 mL  10 mL Intravenous PRN Elveria Pares, APRN        magnesium hydroxide (MILK OF MAGNESIA) 400 MG/5ML suspension 30 mL  30 mL Oral Daily PRN Elveria Pares, APRN        ondansetron [facility]) injection 4 mg  4 mg Intravenous Q6H PRN Elveria Pares, APRN        ipratropium-albuterol (DUONEB) nebulizer solution 1 ampule  1 ampule Inhalation Q4H PRN Elveria Pares, APRN   1 ampule at 11/11/18 1308    influenza quadrivalent split vaccine (FLUZONE;FLUARIX;FLULAVAL;AFLURIA) injection 0.5 mL  0.5 mL Intramuscular Once aMgalis Calles MD        gabapentin (NEURONTIN) capsule 300 mg  300 mg Oral Nightly Magalis Calles MD   300 mg at 11/14/18 2036    gabapentin (NEURONTIN) capsule 100 mg  100 mg Oral Daily Magalis Hand, MD   100 mg at 11/14/18 0831    carvedilol (COREG) tablet 12.5 mg  12.5 mg Oral BID WC Magalis Calles MD   12.5 mg at 11/14/18 0831    clopidogrel (PLAVIX) tablet 75 mg  75 mg Oral Daily Magalis Hand, MD   75 mg at 11/14/18 0831    cilostazol (PLETAL) tablet 100 mg  100 mg Oral BID Magalis Hand, MD   100 mg at 11/14/18 0831    allopurinol (ZYLOPRIM) tablet 100 mg  100 mg Oral Daily Magalis Calles MD   100 mg at

## 2018-11-16 NOTE — PROGRESS NOTES
Physical Therapy  Brandi Fields  [id number]     11/16/18 1022   Subjective   Subjective Attempt, patient having dialysis.    Electronically signed by Celi Cullen PTA on 11/16/2018 at 10:22 AM

## 2018-11-16 NOTE — PROGRESS NOTES
Shift assessment completed. Daughter at bedside. Pt alert and will nod head and attempt hand gestures to communicate. Will continue to monitor and wean drips as permitted.

## 2018-11-16 NOTE — PROGRESS NOTES
Dopamine decreased to 12mcg/kg/min. /61. HR sinus 63.  Electronically signed by Codie Borrego RN on 11/15/2018 at 6:02 PM

## 2018-11-16 NOTE — PROGRESS NOTES
systolic (congestive) and diastolic (congestive) heart failure (HCC)        Internal carotid artery stenosis, bilateral        Acute on chronic combined systolic and diastolic CHF (congestive heart failure) ([facility] [address]              Subjective:  Ms. Palmer Browning seen today awake and alert intubated. No reported chest pain. Rhythm stable on IV amiodarone. Schedule for dialysis this morning. Discussed with nursing staff they believe she can be extubated later today. No new findings. Objective:   BP (!) 120/45   Pulse 69   Temp 98.8 °F (37.1 °C) (Temporal)   Resp 14   Ht 5' 4\" (1.626 m)   Wt 152 lb 9.6 oz (69.2 kg)   LMP 03/15/1970 (Approximate)   SpO2 96%   BMI 26.19 kg/m²       Intake/Output Summary (Last 24 hours) at 11/16/18 1004  Last data filed at 11/16/18 0600   Gross per 24 hour   Intake           2221.9 ml   Output             1787 ml   Net            434.9 ml       Prior to Admission medications    Medication Sig Start Date End Date Taking? Authorizing Provider   Dextromethorphan-Guaifenesin (DIABETIC TUSSIN DM PO) Take by mouth 2 times daily   Yes Historical Provider, MD   gabapentin (NEURONTIN) 100 MG capsule Take 100 mg by mouth 2 times daily. Take 1 100mg tablet in AM and 3 100mg tablets at bedtime .    Yes Historical Provider, MD   insulin glargine (BASAGLAR KWIKPEN) 100 UNIT/ML injection pen Inject 20 Units into the skin nightly   Yes Historical Provider, MD   bumetanide (BUMEX) 1 MG tablet Take 2 mg by mouth daily   Yes Historical Provider, MD   carvedilol (COREG) 12.5 MG tablet Take 1 tablet by mouth 2 times daily (with meals) 11/1/18  Yes Mona Maddox MD   metolazone (ZAROXOLYN) 2.5 MG tablet Take 1 tablet by mouth as needed (only use up to twice a week) 11/1/18  Yes Mona Maddox MD   cilostazol (PLETAL) 100 MG tablet Take 1 tablet by mouth 2 times daily 9/19/18  Yes Rita Altamirano MD   iron polysaccharides (NIFEREX) 150 MG capsule Take 1 capsule by mouth daily 9/19/18  Yes Nola BUSH Tawanna Javier on 11/11/2018 12:19 PM    Xr Chest Portable    Result Date: 11/16/2018  EXAM: XR CHEST PORTABLE -- 11/15/2018 11:15 PM HISTORY: 78 years, Female, intubation COMPARISON: 11/15/2018 TECHNIQUE:  1 images. Frontal view of the chest. FINDINGS:  Endotracheal tube tip projects over the mid trachea. Gastric tube tip traverses the diaphragm at midline, tip outside the field of view. Right dialysis catheter tip overlies upper right atrium. No pneumothorax. Bilateral pleural effusions and bibasilar opacities, mildly increased on the right. There is perihilar vascular prominence and interstitial prominence. Prominent cardiac silhouette. Calcified aortic atherosclerosis. Upper mediastinum within normal limits. Limited assessment of bones and partially visualized upper abdomen due to technique. Calcified carotid atherosclerosis. 1. Bilateral pleural effusions and bibasilar opacities, mildly increased on the right. Similar perihilar vascular and interstitial prominence. These findings could represent edema and/or infection. 2. Prominent cardiac silhouette. 3. Calcified aortic and carotid atherosclerosis. 4. Adequate radiographic position of lines and tubes, described in detail above. Signed by Dr Kia Springer on 11/16/2018 7:51 AM    Xr Chest Portable    Result Date: 11/15/2018  XR CHEST PORTABLE 11/14/2018 11:15 PM HISTORY: Mechanical ventilation COMPARISON: 11/14/2018. FINDINGS: Endotracheal tube is in good position. Increasing atelectasis in the left base with completely obscured left hemidiaphragm. Hazy opacification of the right lung persists, more prominent in the lung base. Heart size is upper limit of normal. The pulmonary vasculature seem appropriate. No pneumothorax. An enteric tube courses below the diaphragm with tip beyond the field-of-view. Right IJ central line identified which is in good position. The osseous structures and surrounding soft tissues demonstrate no acute abnormality.     1. Endotracheal

## 2018-11-16 NOTE — PROGRESS NOTES
Progress Note  11/16/2018 6:31 AM  Subjective:   Admit Date: 11/6/2018  PCP: Rock Nick MD    Interval History: She is in the CCU on the ventilator and is seemingly alert. She follows commands and is using both hands and can move feet with requests.     Diet NPO Effective Now Exceptions are: Sips with Meds    Intake/Output Summary (Last 24 hours) at 11/16/18 0631  Last data filed at 11/16/18 0600   Gross per 24 hour   Intake           2470.7 ml   Output             1962 ml   Net            508.7 ml     Medications:      DOPamine 8 mcg/kg/min (11/16/18 0600)    EPINEPHrine infusion 4 mcg/min (11/16/18 0600)    amiodarone 450mg/250ml D5W infusion Stopped (11/14/18 1815)    amiodarone 450mg/250ml D5W infusion 0.5 mg/min (11/16/18 0600)    sodium chloride 30 mL/hr at 11/16/18 0600    dextrose        pantoprazole  40 mg Intravenous BID    And    sodium chloride (PF)  10 mL Intravenous Daily    heparin (porcine)  5,000 Units Subcutaneous BID    insulin glargine  15 Units Subcutaneous Nightly    amLODIPine  5 mg Oral Daily    amoxicillin  500 mg Oral 2 times per day    vitamin D  50,000 Units Oral Weekly    calcitRIOL  0.25 mcg Oral Daily    ferrous sulfate  325 mg Oral BID WC    insulin lispro  0-12 Units Subcutaneous TID WC    insulin lispro  0-6 Units Subcutaneous Nightly    b complex-C-folic acid  1 capsule Oral Daily    sodium chloride flush  10 mL Intravenous 2 times per day    influenza virus vaccine  0.5 mL Intramuscular Once    gabapentin  300 mg Oral Nightly    gabapentin  100 mg Oral Daily    carvedilol  12.5 mg Oral BID WC    clopidogrel  75 mg Oral Daily    cilostazol  100 mg Oral BID    allopurinol  100 mg Oral Daily     Recent Labs      11/14/18   1205  11/15/18   0056  11/16/18   0415   WBC  11.8*  19.2*  16.9*   HGB  9.2*  8.7*  8.5*   PLT  272  366  355     Recent Labs      11/14/18   1205   11/15/18   0057  11/15/18   0428  11/16/18   0415  11/16/18 0427   NA  140   --

## 2018-11-16 NOTE — PROGRESS NOTES
Spoke with nurse Candice to check on pt. Candice says pt is beginning dialysis today. Pt had coded several days ago when dialysis was started. Pt's family was not present but not allowed in room during dialysis. Will check on pt's family later today if possible.     Electronically signed by Dot Nyhan on 11/16/2018 at 9:30 AM

## 2018-11-17 ENCOUNTER — OUTSIDE FACILITY SERVICE (OUTPATIENT)
Dept: PULMONOLOGY | Facility: CLINIC | Age: 78
End: 2018-11-17

## 2018-11-17 PROCEDURE — 99223 1ST HOSP IP/OBS HIGH 75: CPT | Performed by: INTERNAL MEDICINE

## 2018-11-17 NOTE — PROGRESS NOTES
phenol 1.4 % mouth spray 1 spray  1 spray Mouth/Throat Q2H PRN Justo Gannon MD        LORazepam (ATIVAN) injection 0.5 mg  0.5 mg Intravenous Q4H PRN Todd Lott MD   0.5 mg at 11/17/18 0716    dextrose 5 % solution   Intravenous Continuous Todd Lott MD 15 mL/hr at 11/17/18 0700      DOPamine (INTROPIN) 400 mg in dextrose 5 % 250 mL infusion  10 mcg/kg/min Intravenous Continuous Nikky Luna MD 5.1 mL/hr at 11/17/18 0821 2 mcg/kg/min at 11/17/18 0821    EPINEPHrine (EPINEPHrine HCL) 5 mg in dextrose 5 % 250 mL infusion  1 mcg/min Intravenous Continuous Guillermo Manzano MD   Stopped at 11/16/18 1730    morphine injection 2 mg  2 mg Intravenous Q2H PRN Todd Lott MD   2 mg at 11/17/18 0910    morphine (PF) injection 1 mg  1 mg Intravenous Q2H PRN Todd Lott MD   1 mg at 11/16/18 3233    amiodarone (CORDARONE) 450 mg in dextrose 5 % 250 mL infusion  1 mg/min Intravenous Continuous Nikky Luna MD   Stopped at 11/14/18 1815    pantoprazole (PROTONIX) injection 40 mg  40 mg Intravenous BID Todd Lott MD   40 mg at 11/17/18 0717    And    sodium chloride (PF) 0.9 % injection 10 mL  10 mL Intravenous Daily Todd Lott MD   10 mL at 11/15/18 1047    amiodarone (CORDARONE) 450 mg in dextrose 5 % 250 mL infusion  0.5 mg/min Intravenous Continuous Nikky Luna MD 16.7 mL/hr at 11/17/18 0700 0.5 mg/min at 11/17/18 0700    0.9 % sodium chloride infusion   Intravenous Continuous Nikky Luna MD   Stopped at 11/16/18 1730    heparin (porcine) injection 5,000 Units  5,000 Units Subcutaneous BID Brianna Hernandez MD   5,000 Units at 11/17/18 0828    insulin glargine (LANTUS) injection vial 15 Units  15 Units Subcutaneous Nightly Justo Gannon MD   15 Units at 11/15/18 2112    amLODIPine (NORVASC) tablet 5 mg  5 mg Oral Daily Jamarcus Trinh MD   Stopped at 11/17/18 0805    vitamin D (ERGOCALCIFEROL) capsule 50,000 Units  50,000 Units by Dr Chico Joe on 11/6/2018 8:01 PM       Assessment     1. Acute kidney injury/cardiorenal syndrome. Currently seen on sustained low efficacy dialysis. 2. Cardiopulmonary arrest  3. Stage IV chronic kidney disease baseline. 4. Diabetic nephropathy. 5. Insulin-dependent type II diabetes. 6. Anemia of chronic kidney disease. 7. Congestive heart failure/diastolic/compensated  8. Secondary hyperparathyroidism. 9. Atherosclerotic cardiovascular disease. 9. Hypocalcemia      Plan:  1. Resume SLED and use 4K bath. 2. Consultation with pulmonary medicine  3. Wean off the ventilator.

## 2018-11-17 NOTE — PROGRESS NOTES
Progress Note  11/17/2018 6:03 AM  Subjective:   Admit Date: 11/6/2018  PCP: Nany Vazquez MD    Interval History: She is in the CCU on the ventilator and is seemingly alert. She follows commands and is using both hands and can move feet with requests.     Diet NPO Effective Now Exceptions are: Sips with Meds    Intake/Output Summary (Last 24 hours) at 11/17/18 0603  Last data filed at 11/17/18 0400   Gross per 24 hour   Intake          3648.44 ml   Output             4604 ml   Net          -955.56 ml     Medications:      dextrose 15 mL/hr at 11/17/18 0400    DOPamine 3 mcg/kg/min (11/17/18 0400)    EPINEPHrine infusion Stopped (11/16/18 1730)    amiodarone 450mg/250ml D5W infusion Stopped (11/14/18 1815)    amiodarone 450mg/250ml D5W infusion 0.5 mg/min (11/17/18 0400)    sodium chloride Stopped (11/16/18 1730)    dextrose 10 mL/hr (11/16/18 2100)      pantoprazole  40 mg Intravenous BID    And    sodium chloride (PF)  10 mL Intravenous Daily    heparin (porcine)  5,000 Units Subcutaneous BID    insulin glargine  15 Units Subcutaneous Nightly    amLODIPine  5 mg Oral Daily    amoxicillin  500 mg Oral 2 times per day    vitamin D  50,000 Units Oral Weekly    calcitRIOL  0.25 mcg Oral Daily    ferrous sulfate  325 mg Oral BID WC    insulin lispro  0-12 Units Subcutaneous TID WC    insulin lispro  0-6 Units Subcutaneous Nightly    b complex-C-folic acid  1 capsule Oral Daily    sodium chloride flush  10 mL Intravenous 2 times per day    influenza virus vaccine  0.5 mL Intramuscular Once    gabapentin  300 mg Oral Nightly    gabapentin  100 mg Oral Daily    carvedilol  12.5 mg Oral BID WC    clopidogrel  75 mg Oral Daily    cilostazol  100 mg Oral BID    allopurinol  100 mg Oral Daily     Recent Labs      11/15/18   0056  11/16/18 0415 11/17/18 0400   WBC  19.2*  16.9*  13.3*   HGB  8.7*  8.5*  8.5*   PLT  366  355  287     Recent Labs      11/15/18   0057   11/16/18   0415

## 2018-11-18 ENCOUNTER — OUTSIDE FACILITY SERVICE (OUTPATIENT)
Dept: PULMONOLOGY | Facility: CLINIC | Age: 78
End: 2018-11-18

## 2018-11-18 PROCEDURE — 99233 SBSQ HOSP IP/OBS HIGH 50: CPT | Performed by: INTERNAL MEDICINE

## 2018-11-18 NOTE — PLAN OF CARE
Problem: Falls - Risk of:  Goal: Will remain free from falls  Will remain free from falls   Outcome: Ongoing    Goal: Absence of physical injury  Absence of physical injury   Outcome: Ongoing      Problem: Risk for Impaired Skin Integrity  Goal: Tissue integrity - skin and mucous membranes  Structural intactness and normal physiological function of skin and  mucous membranes.    Outcome: Ongoing      Problem: Pain:  Goal: Pain level will decrease  Pain level will decrease   Outcome: Ongoing    Goal: Control of acute pain  Control of acute pain   Outcome: Ongoing    Goal: Control of chronic pain  Control of chronic pain   Outcome: Ongoing      Problem: OXYGENATION/RESPIRATORY FUNCTION  Goal: Patient will maintain patent airway  Outcome: Ongoing    Goal: Patient will achieve/maintain normal respiratory rate/effort  Respiratory rate and effort will be within normal limits for the patient   Outcome: Ongoing      Problem: HEMODYNAMIC STATUS  Goal: Patient has stable vital signs and fluid balance  Outcome: Ongoing      Problem: Nutrition  Goal: Optimal nutrition therapy  Nutrition Problem: Inadequate oral intake  Intervention: Food and/or Nutrient Delivery: Continue NPO  Nutritional Goals: New goal: Meet nutritional needs through po intake or alternate source of nutrition     Outcome: Ongoing      Problem: Restraint Use - Nonviolent/Non-Self-Destructive Behavior:  Goal: Absence of restraint indications  Absence of restraint indications   Outcome: Ongoing    Goal: Absence of restraint-related injury  Absence of restraint-related injury   Outcome: Ongoing

## 2018-11-18 NOTE — PROGRESS NOTES
Cardiology Daily Note Wing Montaño MD      Patient:  Latricia Allen  [id number]    Patient Active Problem List    Diagnosis Date Noted    Acute on chronic combined systolic and diastolic CHF (congestive heart failure) ([address] 11/06/2018     Priority: Low    Chronic combined systolic (congestive) and diastolic (congestive) heart failure ([address] 09/27/2018     Priority: Low    Internal carotid artery stenosis, bilateral 09/27/2018     Priority: Low    Hypoglycemia 09/13/2018     Priority: Low    Skin ulcer of second toe of left foot with fat layer exposed ([address] 08/09/2016     Priority: Low    Nonhealing ulcer of right lower leg limited to breakdown of skin ([address] 08/02/2016     Priority: Low    Diabetic ulcer of left foot associated with diabetes mellitus due to underlying condition ([address] 08/02/2016     Priority: Low    Atherosclerosis of native artery of both lower extremities with intermittent claudication (HCC)      Priority: Low    CVA (cerebral vascular accident) ([address] 03/13/2016     Priority: Low    Carotid bruit 03/13/2016     Priority: Low    Ataxia      Priority: Low    Superior mesenteric artery stenosis ([address] 09/23/2014     Priority: Low    Atherosclerosis of native artery of extremity with intermittent claudication ([address] 09/22/2014     Priority: Low    Diabetes mellitus ([address]      Priority: Low    Diabetic vasculopathy ([address]      Priority: Low    Hypertension      Priority: Low    Chronic kidney disease 11/01/2018       Admit Date:  11/6/2018    Admission Problem List: Present on Admission:   Acute on chronic combined systolic and diastolic CHF (congestive heart failure) [facility])      Cardiac Specific Data:  Specialty Problems        Cardiology Problems    Hypertension        Superior mesenteric artery stenosis (HCC)        CVA (cerebral vascular accident) ([address]        Atherosclerosis of native artery of both lower extremities with intermittent claudication (HCC)        Chronic combined 8. 5*  8.2*   HCT  26.8*  27.6*  26.9*   MCV  92.4  92.9  94.7   PLT  355  287  272     BMP: Recent Labs      11/16/18 0415 11/17/18 0400   11/18/18   0421  11/18/18   0547  11/18/18   0937   NA  134*   --   134*   --    --   133*   --    K  5.0   < >  3.2*   < >  4.1  4.3  4.4   CL  95*   --   95*   --    --   99   --    CO2  24   --   28   --    --   23   --    BUN  85*   --   13   --    --   9   --    CREATININE  4.3*   --   1.1*   --    --   0.9   --     < > = values in this interval not displayed. LIVER PROFILE:   Recent Labs      11/16/18 0415   AST  42*   ALT  75*   BILITOT  0.4   ALKPHOS  166*     PT/INR:   Recent Labs      11/17/18 0400   PROTIME  15.3*   INR  1.22*     APTT:   Recent Labs      11/17/18 0400   APTT  38.6*     BNP:  No results for input(s): BNP in the last 72 hours. CK, CKMB, Troponin: @LABRCNT (CKTOTAL:3, CKMB:3, TROPONINI:3)@    IMAGING:  Us Renal Complete    Result Date: 11/11/2018  EXAMINATION:  US RENAL COMPLETE  11/11/2018 12:17 PM HISTORY: Renal failure, acute. High blood pressure. COMPARISON: 7/6/2017. TECHNIQUE: Grayscale and color flow imaging were performed. FINDINGS: There is a 5 mm renal stone in the right mid kidney. The right kidney measures 10.3 cm. The cortical thickness and echogenicity are normal. There is no hydronephrosis of the right kidney. The left kidney was obscured by bowel gas and could not be visualized for evaluation. The urinary bladder is decompressed and not well evaluated. 1. The left kidney was obscured by bowel gas and could not be evaluated. 2. The right kidney is normal in size with normal cortical thickness and echogenicity and no hydronephrosis. There is a 5 mm renal stone in the right mid kidney.  Signed by Dr Rafael Mark on 11/11/2018 12:19 PM    Xr Chest Portable    Result Date: 11/18/2018  XR CHEST PORTABLE 11/17/2018 11:15 PM HISTORY: Mechanical ventilation Comparison: 11/17/2018 Findings: Lines and tubes are stable in effusions and bibasilar opacities, mildly increased on the right. Similar perihilar vascular and interstitial prominence. These findings could represent edema and/or infection. 2. Prominent cardiac silhouette. 3. Calcified aortic and carotid atherosclerosis. 4. Adequate radiographic position of lines and tubes, described in detail above. Signed by Dr Layla Mcgrath on 11/16/2018 7:51 AM    Xr Chest Portable    Result Date: 11/15/2018  XR CHEST PORTABLE 11/14/2018 11:15 PM HISTORY: Mechanical ventilation COMPARISON: 11/14/2018. FINDINGS: Endotracheal tube is in good position. Increasing atelectasis in the left base with completely obscured left hemidiaphragm. Hazy opacification of the right lung persists, more prominent in the lung base. Heart size is upper limit of normal. The pulmonary vasculature seem appropriate. No pneumothorax. An enteric tube courses below the diaphragm with tip beyond the field-of-view. Right IJ central line identified which is in good position. The osseous structures and surrounding soft tissues demonstrate no acute abnormality. 1. Endotracheal tube is in stable position. Increasing atelectasis in the left lung base. 2. Stable hazy opacification of the right lung, especially the right lung base. This may relate to underlying right pleural effusion. Signed by Dr Quiana Nelson on 11/15/2018 7:43 AM    Xr Chest Portable    Result Date: 11/14/2018  EXAM: XR CHEST PORTABLE -- 11/14/2018 3:45 PM HISTORY: 78 years, Female, post gastric tube insertion COMPARISON: 11/14/2018 at 1246 TECHNIQUE:  4 images. Portable frontal view of the chest. FINDINGS:  Gastric tube tip overlies the distal stomach. Endotracheal tube tip projects over the mid trachea. Large bore central venous catheter tip overlies upper cavoatrial junction. Lung apices are excluded from view. Similar right diffuse pulmonary opacity. Left lung with perihilar vascular prominence, no overt edema. Possible right pleural effusion.

## 2018-11-18 NOTE — PROGRESS NOTES
Pt tolerating CPAP SBT. VSS. /32 (50), 53, Spo2 100% RR 26 after 30 mins SBT. Vineet Newberry Electronically signed by Chana Lopez RN on 11/18/2018 at 9:21 AM

## 2018-11-18 NOTE — PROGRESS NOTES
11/17/18 2154    gabapentin (NEURONTIN) capsule 100 mg  100 mg Oral Daily Andi Drew, MD   100 mg at 11/18/18 0806    carvedilol (COREG) tablet 12.5 mg  12.5 mg Oral BID WC Andi Drew, MD   12.5 mg at 11/18/18 5987    clopidogrel (PLAVIX) tablet 75 mg  75 mg Oral Daily Andi Drew, MD   75 mg at 11/18/18 0805    cilostazol (PLETAL) tablet 100 mg  100 mg Oral BID Andi Drew, MD   100 mg at 11/18/18 8112    allopurinol (ZYLOPRIM) tablet 100 mg  100 mg Oral Daily Andi Drew, MD   100 mg at 11/18/18 0805       Past Medical History:  Past Medical History:   Diagnosis Date    Arthritis     Atherosclerosis of native arteries of the extremities with intermittent claudication 9/22/2014    Cerebral artery occlusion with cerebral infarction [facility])     [facility]     Diabetes mellitus [facility]     Diabetic vasculopathy ([facility]     Hypertension     Kidney stone     Peripheral vascular disease ([address]     Superior mesenteric artery stenosis ([facility] 9/23/2014       Past Surgical History:  Past Surgical History:   Procedure Laterality Date    APPENDECTOMY      CHOLECYSTECTOMY      COLONOSCOPY      FINGER TRIGGER RELEASE Right     HYSTERECTOMY  40 YEARS PRIOR    PARTIAL    KNEE SURGERY Right     TONSILLECTOMY         Family History  Family History   Problem Relation Age of Onset    Hypertension Father     Diabetes Father     Heart Failure Father     Stroke Mother     Cancer Mother     Diabetes Mother     Seizures Child        Social History  Social History     Social History    Marital status:      Spouse name: N/A    Number of children: 4    Years of education: 12     Occupational History    Not on file.      Social History Main Topics    Smoking status: Former Smoker     Packs/day: 0.50     Quit date: 9/10/2018    Smokeless tobacco: Former User    Alcohol use No    Drug use: No    Sexual activity: Not on file     Other Topics

## 2018-11-18 NOTE — PROGRESS NOTES
< > = values in this interval not displayed. No results for input(s): BC, LABGRAM, CULTRESP, BFCX in the last 72 hours. Radiograph:   XR CHEST PORTABLE 11/17/2018 11:15 PM   HISTORY: Mechanical ventilation   Comparison: 11/17/2018    Findings:    Lines and tubes are stable in position. Bilateral layering pleural   effusions, moderate in size, with obscured lung bases. Reidentified   cardiomegaly with central pulmonary congestion. Interstitial   prominence suggests interstitial edema. No pneumothorax.     No acute osseous or soft tissue abnormality is noted.        Impression   Impression:    1. No significant interval change since previous exam. Reidentified   cardiomegaly with pulmonary congestion and interstitial edema. Essentially stable bilateral layering pleural effusions. Signed by Dr Caridad Watson on 11/18/2018 7:53 AM     My radiograph interpretation: ETT in position, cardiomegaly and vascular congestion persists    Pulmonary Assessment:    1. Acute respiratory failure secondary to volume overload  2. ESRD  3. Chronic systolic and diastolic heart failure  4. DM, insulin dependent  5. Anemia  6. CAD  7. Hypocalcemia  8. QT prolongation    Recommend:     · Ok to extubate  · DC daily CXR and ABGs  · Speech evaluation  · Defer other issues to other providers    Electronically signed by Daryn Farrell on 11/18/2018 at 10:57 AM    She had a good night. Nurses report no new problems. We were able to have her completely complete a spontaneous breathing trial. I was examining the patient around the time of completion of this. She did have a good leak test we deflated the balloon cuff appeared comfortable. Exam showed her to be awake but slightly drowsy, making purposeful movements. Leak test was present. Plan extubation and nasal cannula oxygen. We may need to go with BiPAP if she struggles or has desaturation but parameters appears favorable at this point for success.   Keep in ICU following extubation

## 2018-11-18 NOTE — PROGRESS NOTES
Shortly after flushing diaylsis machine at 2200, machine began alarming TMP High. After troubleshooting, nursing staff were unable to fix the machine from alarming. SLED taken down at 2215. Permacath lines sanitized, saline flushed twice, heparin locked, and wrapped with gauze. Pt's VSS. Will continue to monitor.  Electronically signed by Tyrese Humphrey RN on 11/17/2018 at 11:41 PM

## 2018-11-19 ENCOUNTER — OUTSIDE FACILITY SERVICE (OUTPATIENT)
Dept: PULMONOLOGY | Facility: CLINIC | Age: 78
End: 2018-11-19

## 2018-11-19 PROCEDURE — 99232 SBSQ HOSP IP/OBS MODERATE 35: CPT | Performed by: INTERNAL MEDICINE

## 2018-11-19 NOTE — PROGRESS NOTES
Patient becoming more restless and confused. Patient has stated multiple times that she is at home and needs help getting up to go to the bathroom. Patient frequently takes off her venti maskand oxygen drops to 80s. I have explained to patient that she is in the hospital on continuous dialysis and cannot get up. Patient remains confused.  Electronically signed by Gayle Redman RN on 11/19/2018 at 1:59 PM

## 2018-11-19 NOTE — PROGRESS NOTES
11/19/18 0951   General Comment   Comments Appempt in AM patient in dialysis   Physical Therapy    Electronically signed by Cj Briones PTA on 11/19/2018 at 9:52 AM

## 2018-11-19 NOTE — PROGRESS NOTES
Cardiology Daily Note Tayler Colindres MD      Patient:  Sabiha Phillip  [id number]    Patient Active Problem List    Diagnosis Date Noted    Acute on chronic combined systolic and diastolic CHF (congestive heart failure) ([address] 11/06/2018     Priority: Low    Chronic combined systolic (congestive) and diastolic (congestive) heart failure ([address] 09/27/2018     Priority: Low    Internal carotid artery stenosis, bilateral 09/27/2018     Priority: Low    Hypoglycemia 09/13/2018     Priority: Low    Skin ulcer of second toe of left foot with fat layer exposed ([address] 08/09/2016     Priority: Low    Nonhealing ulcer of right lower leg limited to breakdown of skin ([address] 08/02/2016     Priority: Low    Diabetic ulcer of left foot associated with diabetes mellitus due to underlying condition ([address] 08/02/2016     Priority: Low    Atherosclerosis of native artery of both lower extremities with intermittent claudication (HCC)      Priority: Low    CVA (cerebral vascular accident) ([address] 03/13/2016     Priority: Low    Carotid bruit 03/13/2016     Priority: Low    Ataxia      Priority: Low    Superior mesenteric artery stenosis ([address] 09/23/2014     Priority: Low    Atherosclerosis of native artery of extremity with intermittent claudication ([address] 09/22/2014     Priority: Low    Diabetes mellitus ([address]      Priority: Low    Diabetic vasculopathy ([address]      Priority: Low    Hypertension      Priority: Low    Chronic kidney disease 11/01/2018       Admit Date:  11/6/2018    Admission Problem List: Present on Admission:   Acute on chronic combined systolic and diastolic CHF (congestive heart failure) [facility])      Cardiac Specific Data:  Specialty Problems        Cardiology Problems    Hypertension        Superior mesenteric artery stenosis (HCC)        CVA (cerebral vascular accident) ([address]        Atherosclerosis of native artery of both lower extremities with intermittent claudication (HCC)        Chronic combined systolic (congestive) and diastolic (congestive) heart failure (HCC)        Internal carotid artery stenosis, bilateral        Acute on chronic combined systolic and diastolic CHF (congestive heart failure) ([facility]              Subjective:  Ms. Dotty Melendez seen today somewhat lethargic and extubated yesterday. Undergoing dialysis presently. Denies any anginal type chest pain does have some rib pain. Dyspnea stable. Rhythm stable. Objective:   BP (!) 107/51   Pulse 72   Temp 98.4 °F (36.9 °C) (Temporal)   Resp 15   Ht 5' 4\" (1.626 m)   Wt 157 lb 3.2 oz (71.3 kg)   LMP 03/15/1970 (Approximate)   SpO2 96%   BMI 26.98 kg/m²       Intake/Output Summary (Last 24 hours) at 11/19/18 1024  Last data filed at 11/19/18 1000   Gross per 24 hour   Intake           897.85 ml   Output              972 ml   Net           -74.15 ml       Prior to Admission medications    Medication Sig Start Date End Date Taking? Authorizing Provider   Dextromethorphan-Guaifenesin (DIABETIC TUSSIN DM PO) Take by mouth 2 times daily   Yes Historical Provider, MD   gabapentin (NEURONTIN) 100 MG capsule Take 100 mg by mouth 2 times daily. Take 1 100mg tablet in AM and 3 100mg tablets at bedtime .    Yes Historical Provider, MD   insulin glargine (BASAGLAR KWIKPEN) 100 UNIT/ML injection pen Inject 20 Units into the skin nightly   Yes Historical Provider, MD   bumetanide (BUMEX) 1 MG tablet Take 2 mg by mouth daily   Yes Historical Provider, MD   carvedilol (COREG) 12.5 MG tablet Take 1 tablet by mouth 2 times daily (with meals) 11/1/18  Yes Cherri Kelly MD   metolazone (ZAROXOLYN) 2.5 MG tablet Take 1 tablet by mouth as needed (only use up to twice a week) 11/1/18  Yes Cherri Kelly MD   cilostazol (PLETAL) 100 MG tablet Take 1 tablet by mouth 2 times daily 9/19/18  Yes Audelia Fernandez MD   iron polysaccharides (NIFEREX) 150 MG capsule Take 1 capsule by mouth daily 9/19/18  Yes Audelia Fernandez MD   allopurinol (ZYLOPRIM) 100 MG nontender, nondistended, bowel sounds present  Extremities:  Edema: 1+     Lab Data:  CBC: Recent Labs      11/17/18   0400  11/18/18   0547  11/19/18   0410   WBC  13.3*  9.2  9.5   HGB  8.5*  8.2*  7.9*   HCT  27.6*  26.9*  26.1*   MCV  92.9  94.7  97.0   PLT  287  272  265     BMP: Recent Labs      11/17/18   0400   11/18/18   0547  11/18/18   0937  11/19/18   0410   NA  134*   --   133*   --   134*   K  3.2*   < >  4.3  4.4  4.2   CL  95*   --   99   --   100   CO2  28   --   23   --   27   BUN  13   --   9   --   20   CREATININE  1.1*   --   0.9   --   2.2*    < > = values in this interval not displayed. LIVER PROFILE:   Recent Labs      11/19/18   0410   AST  18   ALT  35*   BILITOT  0.4   ALKPHOS  120*     PT/INR:   Recent Labs      11/17/18   0400   PROTIME  15.3*   INR  1.22*     APTT:   Recent Labs      11/17/18   0400   APTT  38.6*     BNP:  No results for input(s): BNP in the last 72 hours. CK, CKMB, Troponin: @LABRCNT (CKTOTAL:3, CKMB:3, TROPONINI:3)@    IMAGING:  Us Renal Complete    Result Date: 11/11/2018  EXAMINATION:  US RENAL COMPLETE  11/11/2018 12:17 PM HISTORY: Renal failure, acute. High blood pressure. COMPARISON: 7/6/2017. TECHNIQUE: Grayscale and color flow imaging were performed. FINDINGS: There is a 5 mm renal stone in the right mid kidney. The right kidney measures 10.3 cm. The cortical thickness and echogenicity are normal. There is no hydronephrosis of the right kidney. The left kidney was obscured by bowel gas and could not be visualized for evaluation. The urinary bladder is decompressed and not well evaluated. 1. The left kidney was obscured by bowel gas and could not be evaluated. 2. The right kidney is normal in size with normal cortical thickness and echogenicity and no hydronephrosis. There is a 5 mm renal stone in the right mid kidney.  Signed by Dr Indio Carter on 11/11/2018 12:19 PM    Xr Chest Portable    Result Date: 11/18/2018  XR CHEST PORTABLE 11/17/2018 11:15 PM

## 2018-11-19 NOTE — PROGRESS NOTES
Pulmonary and Critical Care Progress Note [address] [facility]    Patient: Marge Crespo  1940   MR# [id number]   Acct# [de-identified]  11/19/18   9:33 AM  Referring Provider: Reina Stewart MD  Problem list:   Patient Active Problem List   Diagnosis    Hypertension    Atherosclerosis of native artery of extremity with intermittent claudication ([address]    Diabetes mellitus ([address]    Diabetic vasculopathy ([address]    Superior mesenteric artery stenosis ([address]    CVA (cerebral vascular accident) ([address]    Carotid bruit    Ataxia    Atherosclerosis of native artery of both lower extremities with intermittent claudication ([address]    Nonhealing ulcer of right lower leg limited to breakdown of skin ([address]    Diabetic ulcer of left foot associated with diabetes mellitus due to underlying condition ([address]    Skin ulcer of second toe of left foot with fat layer exposed ([address]    Hypoglycemia    Chronic combined systolic (congestive) and diastolic (congestive) heart failure ([address]    Internal carotid artery stenosis, bilateral    Chronic kidney disease    Acute on chronic combined systolic and diastolic CHF (congestive heart failure) ([address]     Chief Complaint: Weak cough    Interval history: She was extubated yesterday. Her cough is weak and non productive. She's getting dialysis this morning. She's on 50% venti mask, 10L O2. Dopamine was started last night for blood pressure support, current MAP 75. No family present. No other aggravating or alleviating factors or associated symptoms.       bumetanide 1 mg Intravenous BID   scopolamine 1 patch Transdermal Q72H   pantoprazole 40 mg Intravenous BID   And      sodium chloride (PF) 10 mL Intravenous Daily   heparin (porcine) 5,000 Units Subcutaneous BID   insulin glargine 15 Units Subcutaneous Nightly   amLODIPine 5 mg Oral Daily   vitamin D 50,000 Units Oral Weekly   calcitRIOL 0.25 mcg Oral Daily   ferrous sulfate 325 mg Oral BID WC   insulin lispro 0-12 Units

## 2018-11-19 NOTE — PROGRESS NOTES
Pts BP continuing to fluctuate throughout the night. Pt restarted on dopamine gtt to increase BP. See MAR for details. Pt SpO2 also dropping slightly this hour. Venti mask increased to 40% FiO2. Lung sounds auscultated rhonchi with diminished bases. Will continue to monitor.  Electronically signed by Power Sexton RN on 11/19/2018 at 5:47 AM

## 2018-11-19 NOTE — PROGRESS NOTES
Inhalation BID Jing Maple, APRN   600 mg at 11/19/18 1014    metoprolol (LOPRESSOR) injection 2.5 mg  2.5 mg Intravenous Q6H Jamarcus Barntley MD        bumetanide (BUMEX) injection 1 mg  1 mg Intravenous BID Kike Gonzalez APRN   1 mg at 11/19/18 0816    scopolamine (TRANSDERM-SCOP) transdermal patch 1 patch  1 patch Transdermal Q72H Precious Salgado MD   1 patch at 11/18/18 1413    heparin (porcine) injection 3,400 Units  3,400 Units Intercatheter PRN aYnni Mendoza MD   3,400 Units at 11/17/18 1613    0.9 % sodium chloride bolus  200 mL Intravenous PRN Yanni Mendoza MD        0.9 % sodium chloride bolus  100 mL Intravenous Q30 Min PRN Yanni Mendoza MD        albumin human 25 % solution 12.5 g  12.5 g Intravenous PRN Yanni Mendoza MD        phenol 1.4 % mouth spray 1 spray  1 spray Mouth/Throat Q2H PRN Jamarcus Brantley MD   1 spray at 11/18/18 2326    LORazepam (ATIVAN) injection 0.5 mg  0.5 mg Intravenous Q4H PRN Precious Salgado MD   0.5 mg at 11/17/18 2244    dextrose 5 % solution   Intravenous Continuous Precious Salgado MD 15 mL/hr at 11/18/18 1400      DOPamine (INTROPIN) 400 mg in dextrose 5 % 250 mL infusion  10 mcg/kg/min Intravenous Continuous Diana Denny MD 38 mL/hr at 11/19/18 0719 15 mcg/kg/min at 11/19/18 0719    EPINEPHrine (EPINEPHrine HCL) 5 mg in dextrose 5 % 250 mL infusion  1 mcg/min Intravenous Continuous Yanni Mendoza MD   Stopped at 11/16/18 1730    morphine injection 2 mg  2 mg Intravenous Q2H PRN Precious Salgado MD   2 mg at 11/19/18 1144    morphine (PF) injection 1 mg  1 mg Intravenous Q2H PRN Precious Salgado MD   1 mg at 11/18/18 0538    amiodarone (CORDARONE) 450 mg in dextrose 5 % 250 mL infusion  1 mg/min Intravenous Continuous Diana Denny MD   Stopped at 11/18/18 1900    pantoprazole (PROTONIX) injection 40 mg  40 mg Intravenous BID Precious Salgado MD   40 mg at 11/19/18 0816    And    sodium chloride (PF) 0.9 % injection evaluated. 2. The right kidney is normal in size with normal cortical thickness and echogenicity and no hydronephrosis. There is a 5 mm renal stone in the right mid kidney. Signed by Dr Pily Tesfaye on 11/11/2018 12:19 PM    Xr Chest Portable    Result Date: 11/18/2018  XR CHEST PORTABLE 11/17/2018 11:15 PM HISTORY: Mechanical ventilation Comparison: 11/17/2018 Findings: Lines and tubes are stable in position. Bilateral layering pleural effusions, moderate in size, with obscured lung bases. Reidentified cardiomegaly with central pulmonary congestion. Interstitial prominence suggests interstitial edema. No pneumothorax. No acute osseous or soft tissue abnormality is noted. Impression: 1. No significant interval change since previous exam. Reidentified cardiomegaly with pulmonary congestion and interstitial edema. Essentially stable bilateral layering pleural effusions. Signed by Dr Caridad Watson on 11/18/2018 7:53 AM    Xr Chest Portable    Result Date: 11/17/2018  XR CHEST PORTABLE 11/16/2018 11:15 PM HISTORY: Mechanical ventilation Comparison: 1/16/2018 Findings: Lines and tubes are stable in position. Reidentified bilateral layering pleural effusions with obscuration of the lung bases, appearing stable. No increasing consolidation. No pneumothorax. Cardiomegaly and central pulmonary congestion are unchanged. No acute osseous or soft tissue abnormality is noted. Impression: 1. No significant interval change from the previous exam. Signed by Dr Caridad Watson on 11/17/2018 7:28 AM    Xr Chest Portable    Result Date: 11/16/2018  EXAM: XR CHEST PORTABLE -- 11/15/2018 11:15 PM HISTORY: 78 years, Female, intubation COMPARISON: 11/15/2018 TECHNIQUE:  1 images. Frontal view of the chest. FINDINGS:  Endotracheal tube tip projects over the mid trachea. Gastric tube tip traverses the diaphragm at midline, tip outside the field of view. Right dialysis catheter tip overlies upper right atrium. No pneumothorax. Portable frontal view of the chest. FINDINGS:  Gastric tube tip overlies the distal stomach. Endotracheal tube tip projects over the mid trachea. Large bore central venous catheter tip overlies upper cavoatrial junction. Lung apices are excluded from view. Similar right diffuse pulmonary opacity. Left lung with perihilar vascular prominence, no overt edema. Possible right pleural effusion. Calcified aortic atherosclerosis. Prominent cardiac silhouette. Right-sided rib fractures. Limited assessment of bowel gas pattern due to field-of-view. 1. Gastric tube tip overlies distal stomach. Other lines and tubes as above. 2. Similar diffuse right pulmonary opacities. Possible right pleural effusion. Left lung with perihilar vascular prominence, no overt edema. 3. Calcified aortic atherosclerosis and prominent cardiac silhouette. Signed by Dr Lurlean Frankel on 11/14/2018 5:02 PM    Xr Chest Portable    Result Date: 11/14/2018  XR CHEST PORTABLE 11/14/2018 11:45 AM HISTORY: Intubation COMPARISON: 11/6/2018. FINDINGS: An endotracheal tube is identified which appears in good position. Hazy densities project throughout the right hemithorax. The left lung is clear. No mediastinal shift. No pneumothorax. Cardiomegaly is present with mild central pulmonary congestion. A right neck dual-lumen central venous catheter is seen which appears to be in good position. 1. Endotracheal tube is in good position. 2. Patchy densities project throughout the right hemithorax. The left lung is relatively clear. Distribution would be unusual for pulmonary edema unless in a prolonged lateral decubitus position. This seems more likely related to atelectasis or perhaps pneumonia. Signed by Dr Robe Collins on 11/14/2018 1:24 PM    Xr Chest Portable    Result Date: 11/6/2018  XR CHEST PORTABLE 11/6/2018 3:45 PM HISTORY: Shortness of breath COMPARISON: 9/13/2018. FINDINGS: Frontal view of the chest was obtained.   Opacities in the left lower lobe

## 2018-11-19 NOTE — CARE COORDINATION
[facility] offered a bed. Carrie Michaels advised Pt cannot be admitted until tomorrow or after completing IV iron. SW met with Pt to discuss the bed offer. Pt was okay and willing to go to [facility]. SW and Pt discussed transportation. Pt stated her daughter could transport her. SW called Pt's daughter, Zhane Damian, and advised of the bed offer from [facility]. Zhane Damian stated she just got off the phone with [address] offered her a bed and she accepted the bed. SW advised of the conservation with Pt. Zhane Damian stated she would talk with her mother. Zhane Damian advised [facility] is unable to accept Pt until after completing IV iron. Zhane Damian stated she is patient's power of ; SW requested the paperwork to be placed in the chart as there was no records to verify this. SW transferred Zhane Damian to [employer] RN phone. Zhane Damian stated she would like SW know of which facility she wanted to go to.     Marquis Cho from [facility] contacted SW of the bed offer. Currently, two bed offers - [facility] and [facility]. Family will let SW know of the facility of choice. Pt can be discharged after completing IV iron.      Electronically signed by Tg Hsu on 11/9/2018 at 3:51 PM
[facility] faxed the request for HD chair set up.      [facility]  P: [phone]  F: [phone]    Electronically signed by Rema Alvarenga on 11/15/2018 at 9:02 AM
[facility] received a phone call from [place] ([place] coordinator) [phone]. Albaro Johnson advised she would contact the clinic. [facility] provided an update on Pt and advised Pt only had 25 minutes of HD before she coded.      Electronically signed by Umair Adrian on 11/16/2018 at 3:44 PM
SW received a phone call from one of the HD Clinic coordinators for [place] [facility] [place]. SW and Coordinator discussed the chair need. SW advised of Pt not receiving a full treatment and coded after 25 minutes. SW advised Pt is in CCU. Clinic requested a phone call at least one day in advance to obtain a chair time. HD - [phone].     Electronically signed by Rema Alvarenga on 11/19/2018 at 8:50 AM
Spoke with Josph Libman re vascular consult for perma cath she said she would let Dr. Dc Hull know
seek help/take action for Emergent/Urgent situations i.e. fire, crime, inclement weather or health crisis. :  Independent  Ability handle personal hygiene needs (bathing/dressing/grooming): Independent  Ability to manage medications: Independent  Ability to prepare food:  Needs Assistance  Ability to maintain home (clean home, laundry):  Needs Assistance  Ability to drive and/or has transportation:  Dependent  Ability to do shopping:  Needs Assistance  Ability to manage finances: Independent  Is patient able to live independently?:  No  Hearing and Vision  Visual Impairment:  Reading glasses  Hearing Impairment:  Hard of hearing  Care Transitions Interventions         Follow Up: Met at bedside with patient and discussed BPCI-A. Presented the CMS Beneficiary Letter. She is agreeable with follow up as appropriate after discharge. She lives with her youngest daughter and her  and assists them to take take of her oldest daughter who is \"mentally challenged\" and also lives with them. She reported that she is fairly independent with most ADLs, just needs a little help every now and then when she is sick. Denied any issues related to finances, obtaining meds, supplies, equipment, etc.  No immediate needs noted. Follow up as indicated after discharge.        Future Appointments  Date Time Provider Scotty Mehta   12/3/2018 2:45 PM Иван Potter MD [id number]       Health Maintenance  Health Maintenance Due   Topic Date Due    CHARLETTE (modify frequency per FRAX score)  03/08/2006       Raoul Álvarez RN

## 2018-11-20 ENCOUNTER — OUTSIDE FACILITY SERVICE (OUTPATIENT)
Dept: PULMONOLOGY | Facility: CLINIC | Age: 78
End: 2018-11-20

## 2018-11-20 PROCEDURE — 99232 SBSQ HOSP IP/OBS MODERATE 35: CPT | Performed by: INTERNAL MEDICINE

## 2018-11-20 NOTE — PROGRESS NOTES
Wound Care  Referral re: open area to sacrum. Spoke with Alysa Choudhury RN patient nurse today. Patient was given pain  Medications and is currently resting. Discussed area to sacrum with shear and linear line. Has been wearing a mepilex dressing, immobility. Will plan to follow up with patient at a later time. Reviewed pressure ulcer prevention and wound order set, reviewed and updated.

## 2018-11-20 NOTE — PROGRESS NOTES
Nutrition Assessment    Type and Reason for Visit: Reassess    Nutrition Recommendations: follow for SLP recommendations. Start EN if to remain NPO longer    Nutrition Assessment: Received consult for wounds. Pt remains NPO. SLP tried to evaluate 11/19--was asked to hold off as pt could not tolerate meds or water. Pt starting day 6 of NPO. If unable to take PO could EN  please be started. Malnutrition Assessment:  · Malnutrition Status: At risk for malnutrition  · Context: Acute illness or injury  · Findings of the 6 clinical characteristics of malnutrition (Minimum of 2 out of 6 clinical characteristics is required to make the diagnosis of moderate or severe Protein Calorie Malnutrition based on AND/ASPEN Guidelines):  1. Energy Intake-Less than 75% of estimated energy requirement for greater than or equal to 3 months, greater than or equal to 5 days    2. Weight Loss-Unable to assess, in 1 week  3. Fat Loss-No significant subcutaneous fat loss,    4. Muscle Loss-No significant muscle mass loss,    5. Fluid Accumulation-Unable to assess,    6.   Strength-Not measured    Nutrition Risk Level: High    Nutrient Needs:  · Estimated Daily Total Kcal: 7693-2733  · Estimated Daily Protein (g): 40-53  · Estimated Daily Total Fluid (ml/day): 9463-6324    Nutrition Diagnosis:   · Problem: Inadequate oral intake  · Etiology: related to Impaired respiratory function-inability to consume food     Signs and symptoms:  as evidenced by NPO status due to medical condition    Objective Information:  · Nutrition-Focused Physical Findings: extubated  · Wound Type: Skin Tears, Surgical Wound, Stage II  · Current Nutrition Therapies:  · Oral Diet Orders: NPO   · Oral Diet intake: NPO  · Oral Nutrition Supplement (ONS) Orders: None  · ONS intake: NPO     · Anthropometric Measures:  · Ht: 5' 4\" (162.6 cm)   · Current Body Wt: 150 lb 14 oz (68.4 kg)  · Admission Body Wt: 148 lb (67.1 kg)  · Ideal Body Wt: 120 lb (54.4 kg),

## 2018-11-20 NOTE — PROGRESS NOTES
Pulmonary and Critical Care Progress Note [address] [facility]    Patient: Yoselin Buchanan  1940   MR# [id number]   Acct# [de-identified]  11/20/18   9:02 AM  Referring Provider: Magalis Calles MD  Problem list:   Patient Active Problem List   Diagnosis    Hypertension    Atherosclerosis of native artery of extremity with intermittent claudication ([address]    Diabetes mellitus ([address]    Diabetic vasculopathy ([address]    Superior mesenteric artery stenosis ([address]    CVA (cerebral vascular accident) ([address]    Carotid bruit    Ataxia    Atherosclerosis of native artery of both lower extremities with intermittent claudication ([address]    Nonhealing ulcer of right lower leg limited to breakdown of skin ([address]    Diabetic ulcer of left foot associated with diabetes mellitus due to underlying condition ([address]    Skin ulcer of second toe of left foot with fat layer exposed ([address]    Hypoglycemia    Chronic combined systolic (congestive) and diastolic (congestive) heart failure ([address]    Internal carotid artery stenosis, bilateral    Chronic kidney disease    Acute on chronic combined systolic and diastolic CHF (congestive heart failure) ([address]     Chief Complaint: Weak cough    Interval history: Her cough remains weak and non productive. She's on 50% venti mask, 10L O2, sat 98% at rest.  She is still unable to take anything by mouth without coughing and her nurse does not know what the plan is for nutrition. No family present. No other aggravating or alleviating factors or associated symptoms.       ipratropium-albuterol 1 ampule Inhalation Q4H   acetylcysteine 600 mg Inhalation BID   metoprolol 2.5 mg Intravenous Q6H   bumetanide 1 mg Intravenous BID   scopolamine 1 patch Transdermal Q72H   pantoprazole 40 mg Intravenous BID   And      sodium chloride (PF) 10 mL Intravenous Daily   heparin (porcine) 5,000 Units Subcutaneous BID   insulin glargine 15 Units Subcutaneous Nightly   vitamin D 50,000 Units Oral Weekly

## 2018-11-20 NOTE — PROGRESS NOTES
Re-assessment completed, see flow sheets. Patient is currently resting without any s/s of distress or anxiety.  Electronically signed by Bere Figueredo RN on 11/20/2018 at 12:12 AM'

## 2018-11-20 NOTE — PROGRESS NOTES
2.5 mg at 11/20/18 0911    morphine injection 1 mg  1 mg Intravenous Q2H PRN Janine Lee MD   1 mg at 11/20/18 1015    Or    morphine injection 2 mg  2 mg Intravenous Q2H PRN Janine Lee MD   2 mg at 11/20/18 0640    bumetanide (BUMEX) injection 1 mg  1 mg Intravenous BID Cozetta Done, APRN   1 mg at 11/20/18 0910    scopolamine (TRANSDERM-SCOP) transdermal patch 1 patch  1 patch Transdermal Q72H Janine Lee MD   1 patch at 11/18/18 1413    heparin (porcine) injection 3,400 Units  3,400 Units Intercatheter PRN Army Ifeanyi MD   3,400 Units at 11/17/18 1613    0.9 % sodium chloride bolus  200 mL Intravenous PRN Army Ifeanyi MD        0.9 % sodium chloride bolus  100 mL Intravenous Q30 Min PRN Army Ifeanyi MD        albumin human 25 % solution 12.5 g  12.5 g Intravenous PRN Army Ifeanyi MD        phenol 1.4 % mouth spray 1 spray  1 spray Mouth/Throat Q2H PRN Efren Buchanan MD   1 spray at 11/18/18 2326    LORazepam (ATIVAN) injection 0.5 mg  0.5 mg Intravenous Q4H PRN Janine Lee MD   0.5 mg at 11/19/18 2234    dextrose 5 % solution   Intravenous Continuous Janine Lee MD   Stopped at 11/19/18 1908    DOPamine (INTROPIN) 400 mg in dextrose 5 % 250 mL infusion  10 mcg/kg/min Intravenous Continuous Gloria Gil MD 12.7 mL/hr at 11/20/18 0835 5 mcg/kg/min at 11/20/18 0835    EPINEPHrine (EPINEPHrine HCL) 5 mg in dextrose 5 % 250 mL infusion  1 mcg/min Intravenous Continuous Army Ifeanyi MD   Stopped at 11/16/18 1730    amiodarone (CORDARONE) 450 mg in dextrose 5 % 250 mL infusion  1 mg/min Intravenous Continuous Gloria Gil MD   Stopped at 11/18/18 1900    pantoprazole (PROTONIX) injection 40 mg  40 mg Intravenous BID Janine Lee MD   40 mg at 11/20/18 0910    And    sodium chloride (PF) 0.9 % injection 10 mL  10 mL Intravenous Daily Janine Lee MD   10 mL at 11/19/18 0821    amiodarone (CORDARONE) 450 mg in dextrose 5 % 250 mL Education HS    [employer]    Smokes 1/2 ppd    Denies alcohol consumption or substance usage    Physically sedentary    Quit driving 7/2/2294    Walks with walker         Review of Systems:  History obtained from chart review and the patient  General ROS: No fever or chills  Respiratory ROS: No cough, shortness of breath, wheezing  Cardiovascular ROS: No chest pain or palpitations  Gastrointestinal ROS: No abdominal pain or melena  Genito-Urinary ROS: No dysuria or hematuria  Musculoskeletal ROS: No joint pain or swelling         Objective:  Blood pressure (!) 115/50, pulse 66, temperature 97.5 °F (36.4 °C), temperature source Temporal, resp. rate 12, height 5' 4\" (1.626 m), weight 150 lb 14.4 oz (68.4 kg), last menstrual period 03/15/1970, SpO2 96 %, not currently breastfeeding. Intake/Output Summary (Last 24 hours) at 11/20/18 1308  Last data filed at 11/20/18 1200   Gross per 24 hour   Intake          1229.82 ml   Output             1260 ml   Net           -30.18 ml     General: awake/alert   Chest:  Coarse rhonchi bilat  CVS: regular rate and rhythm  Abdominal: soft, nontender, normal bowel sounds  Extremities: no cyanosis or edema  Skin: warm and dry without rash    Labs:  BMP:   Recent Labs      11/18/18   0547  11/18/18   0937  11/19/18   0410  11/20/18   0335   NA  133*   --   134*  135*   K  4.3  4.4  4.2  3.6   CL  99   --   100  98   CO2  23   --   27  28   PHOS   --    --    --   2.8   BUN  9   --   20  13   CREATININE  0.9   --   2.2*  1.6*   CALCIUM  7.7*   --   8.5*  8.2*     CBC:   Recent Labs      11/18/18   0547  11/19/18   0410   WBC  9.2  9.5   HGB  8.2*  7.9*   HCT  26.9*  26.1*   MCV  94.7  97.0   PLT  272  265     LIVER PROFILE:   Recent Labs      11/19/18   0410   AST  18   ALT  35*   BILITOT  0.4   ALKPHOS  120*     PT/INR:   No results for input(s): PROTIME, INR in the last 72 hours. APTT:   No results for input(s): APTT in the last 72 hours.   BNP:  No results for input(s): BNP are excluded from view. Similar right diffuse pulmonary opacity. Left lung with perihilar vascular prominence, no overt edema. Possible right pleural effusion. Calcified aortic atherosclerosis. Prominent cardiac silhouette. Right-sided rib fractures. Limited assessment of bowel gas pattern due to field-of-view. 1. Gastric tube tip overlies distal stomach. Other lines and tubes as above. 2. Similar diffuse right pulmonary opacities. Possible right pleural effusion. Left lung with perihilar vascular prominence, no overt edema. 3. Calcified aortic atherosclerosis and prominent cardiac silhouette. Signed by Dr Kianna Vicente on 11/14/2018 5:02 PM    Xr Chest Portable    Result Date: 11/14/2018  XR CHEST PORTABLE 11/14/2018 11:45 AM HISTORY: Intubation COMPARISON: 11/6/2018. FINDINGS: An endotracheal tube is identified which appears in good position. Hazy densities project throughout the right hemithorax. The left lung is clear. No mediastinal shift. No pneumothorax. Cardiomegaly is present with mild central pulmonary congestion. A right neck dual-lumen central venous catheter is seen which appears to be in good position. 1. Endotracheal tube is in good position. 2. Patchy densities project throughout the right hemithorax. The left lung is relatively clear. Distribution would be unusual for pulmonary edema unless in a prolonged lateral decubitus position. This seems more likely related to atelectasis or perhaps pneumonia. Signed by Dr Adela Mari on 11/14/2018 1:24 PM    Xr Chest Portable    Result Date: 11/6/2018  XR CHEST PORTABLE 11/6/2018 3:45 PM HISTORY: Shortness of breath COMPARISON: 9/13/2018. FINDINGS: Frontal view of the chest was obtained. Opacities in the left lower lobe are seen. Pulmonary vascularity is slightly increased. The cardiac silhouette remains enlarged. There is atherosclerosis in the aorta. The osseous structures and surrounding soft tissues demonstrate no acute abnormality.     1.

## 2018-11-20 NOTE — PROGRESS NOTES
11/20/18 0958   Restrictions/Precautions   Restrictions/Precautions Fall Risk   Required Braces or Orthoses? No   General   Chart Reviewed Yes   Subjective   Subjective Patient agrees to ex in bed   General Comment   Comments No ex on r LE due to central line in thigh area   Pain Screening   Patient Currently in Pain Yes   Intervention List Patient able to continue with treatment   Pain Assessment   Pain Assessment Faces   Pain Type Acute pain   Pain Location Rib cage   Pain Orientation Left   Simmons-Juárez Pain Rating 4   Oxygen Therapy   SpO2 98 %   O2 Device Venturi mask   O2 Flow Rate (L/min) 5 L/min   Exercises   Straight Leg Raise 10   Heelslides 10   Hip Abduction 10   Knee Short Arc Quad 10   Knee Active Range of Motion yes   Ankle Pumps 10   Upper Extremity 10   Comments BL UE exercised   Other Activities   Comment Assisted NSG in repositioning patient   Patient Goals    Patient goals  go home   Short term goals   Time Frame for Short term goals 2 wks   Short term goal 1 supine to sit Min A   Short term goal 2 sit to stand Min A   Short term goal 3 amb. 48' with Min A   Conditions Requiring Skilled Therapeutic Intervention   Body structures, Functions, Activity limitations Decreased functional mobility ; Decreased ROM; Decreased strength;Decreased sensation;Decreased endurance;Decreased safe awareness;Decreased balance;Decreased coordination   Activity Tolerance   Activity Tolerance Treatment limited secondary to medical complications (free text)   Safety Devices   Type of devices Left in bed;Call light within reach   Physical Therapy    Electronically signed by Mayra Escobar PTA on 11/20/2018 at 10:05 AM

## 2018-11-20 NOTE — PROGRESS NOTES
by mouth daily 9/19/18  Yes Ronda Castaneda MD   amLODIPine (NORVASC) 10 MG tablet Take 1 tablet by mouth daily 9/19/18  Yes Ronda Castaneda MD   clopidogrel (PLAVIX) 75 MG tablet Take 1 tablet by mouth daily 3/30/16  Yes Es Jane MD   pantoprazole (PROTONIX) 40 MG tablet Take 1 tablet by mouth 2 times daily (before meals) 3/30/16  Yes Es Jane MD   meclizine (ANTIVERT) 25 MG tablet Take 25 mg by mouth 3 times daily as needed (took at 1100 today)    Yes Aquiles Joiner MD   HYDROcodone-acetaminophen (NORCO) 7.5-325 MG per tablet Take 1 tablet by mouth every 6 hours as needed for Pain .  2/14/17   Dianne Busch MD        ipratropium-albuterol  1 ampule Inhalation Q4H    acetylcysteine  600 mg Inhalation BID    metoprolol  2.5 mg Intravenous Q6H    bumetanide  1 mg Intravenous BID    scopolamine  1 patch Transdermal Q72H    pantoprazole  40 mg Intravenous BID    And    sodium chloride (PF)  10 mL Intravenous Daily    heparin (porcine)  5,000 Units Subcutaneous BID    insulin glargine  15 Units Subcutaneous Nightly    vitamin D  50,000 Units Oral Weekly    calcitRIOL  0.25 mcg Oral Daily    insulin lispro  0-12 Units Subcutaneous TID WC    insulin lispro  0-6 Units Subcutaneous Nightly    sodium chloride flush  10 mL Intravenous 2 times per day    influenza virus vaccine  0.5 mL Intramuscular Once    clopidogrel  75 mg Oral Daily    cilostazol  100 mg Oral BID       TELEMETRY: Sinus     Physical Exam:      Physical Exam      General:  Awake, alert, NAD  Skin:  Warm and dry  Neck:  no jvd , no carotid bruits  Chest:  Clear to auscultation, no wheezing or rales  Cardiovascular:  RRR M8R2 no murmurs, clicks, gallups, or rubs  Abdomen:  Soft nontender, nondistended, bowel sounds present  Extremities:  Edema: none        Lab Data:  CBC: Recent Labs      11/18/18   0547  11/19/18   0410   WBC  9.2  9.5   HGB  8.2*  7.9*   HCT  26.9*  26.1*   MCV  94.7  97.0   PLT  272  265     BMP: abnormality is noted. Impression: 1. No significant interval change since previous exam. Reidentified cardiomegaly with pulmonary congestion and interstitial edema. Essentially stable bilateral layering pleural effusions. Signed by Dr Fiona Miranda on 11/18/2018 7:53 AM    Xr Chest Portable    Result Date: 11/17/2018  XR CHEST PORTABLE 11/16/2018 11:15 PM HISTORY: Mechanical ventilation Comparison: 1/16/2018 Findings: Lines and tubes are stable in position. Reidentified bilateral layering pleural effusions with obscuration of the lung bases, appearing stable. No increasing consolidation. No pneumothorax. Cardiomegaly and central pulmonary congestion are unchanged. No acute osseous or soft tissue abnormality is noted. Impression: 1. No significant interval change from the previous exam. Signed by Dr Fiona Miranda on 11/17/2018 7:28 AM    Xr Chest Portable    Result Date: 11/16/2018  EXAM: XR CHEST PORTABLE -- 11/15/2018 11:15 PM HISTORY: 78 years, Female, intubation COMPARISON: 11/15/2018 TECHNIQUE:  1 images. Frontal view of the chest. FINDINGS:  Endotracheal tube tip projects over the mid trachea. Gastric tube tip traverses the diaphragm at midline, tip outside the field of view. Right dialysis catheter tip overlies upper right atrium. No pneumothorax. Bilateral pleural effusions and bibasilar opacities, mildly increased on the right. There is perihilar vascular prominence and interstitial prominence. Prominent cardiac silhouette. Calcified aortic atherosclerosis. Upper mediastinum within normal limits. Limited assessment of bones and partially visualized upper abdomen due to technique. Calcified carotid atherosclerosis. 1. Bilateral pleural effusions and bibasilar opacities, mildly increased on the right. Similar perihilar vascular and interstitial prominence. These findings could represent edema and/or infection. 2. Prominent cardiac silhouette. 3. Calcified aortic and carotid atherosclerosis.  4. 9.1  5. Diabetes  6. Hypertension  7. Superior mesenteric artery stenosis  8. Carotid artery stenosis  9. History of CVA  10. Acute respiratory failure requiring mechanical ventilatory support since 11/14/18  11. Dialysis instituted  12. Abnormal chest x-ray small pleural effusion  13. Extubated 11/18/18    Plan:  1. Continue current treatment  2. Attempt to increase activity  3.  Speech evaluation and hopefully progress with dietary needs    Aziza Butts MD 11/20/2018 9:17 AM

## 2018-11-20 NOTE — PROGRESS NOTES
Physical assessment completed, see flow sheets. Patient is laying in bed resting with HOB elevated. Patient can state that she is at [facility] but does not know what month or how her condition is. Currently patient is on a dopamine and amiodarone drip at this time.   Electronically signed by Cristy Michaels RN on 11/19/2018 at 7:38 PM

## 2018-11-21 ENCOUNTER — OUTSIDE FACILITY SERVICE (OUTPATIENT)
Dept: PULMONOLOGY | Facility: CLINIC | Age: 78
End: 2018-11-21

## 2018-11-21 PROCEDURE — 99232 SBSQ HOSP IP/OBS MODERATE 35: CPT | Performed by: INTERNAL MEDICINE

## 2018-11-21 NOTE — PROGRESS NOTES
Progress Note  11/21/2018 6:18 AM  Subjective:   Admit Date: 11/6/2018  PCP: Domonique Snider MD    Interval History: She is in the CCU and off of the ventilator. She has not had nutrition in a few days. I asked her if she would be willing to let nurse place a feeding tube to give her nutrition over the next few days and she agreed.     Diet NPO Effective Now Exceptions are: Sips with Meds    Intake/Output Summary (Last 24 hours) at 11/21/18 0618  Last data filed at 11/21/18 0405   Gross per 24 hour   Intake           589.65 ml   Output              760 ml   Net          -170.35 ml     Medications:      dextrose Stopped (11/19/18 1908)    DOPamine Stopped (11/20/18 2330)    EPINEPHrine infusion Stopped (11/16/18 1730)    amiodarone 450mg/250ml D5W infusion Stopped (11/18/18 1900)    amiodarone 450mg/250ml D5W infusion 0.5 mg/min (11/21/18 0040)    sodium chloride Stopped (11/19/18 0200)    dextrose Stopped (11/19/18 1908)      ipratropium-albuterol  1 ampule Inhalation Q4H    acetylcysteine  600 mg Inhalation BID    metoprolol  2.5 mg Intravenous Q6H    bumetanide  1 mg Intravenous BID    scopolamine  1 patch Transdermal Q72H    pantoprazole  40 mg Intravenous BID    And    sodium chloride (PF)  10 mL Intravenous Daily    heparin (porcine)  5,000 Units Subcutaneous BID    insulin glargine  15 Units Subcutaneous Nightly    vitamin D  50,000 Units Oral Weekly    calcitRIOL  0.25 mcg Oral Daily    insulin lispro  0-12 Units Subcutaneous TID WC    insulin lispro  0-6 Units Subcutaneous Nightly    sodium chloride flush  10 mL Intravenous 2 times per day    influenza virus vaccine  0.5 mL Intramuscular Once    clopidogrel  75 mg Oral Daily    cilostazol  100 mg Oral BID     Recent Labs      11/19/18   0410   WBC  9.5   HGB  7.9*   PLT  265     Recent Labs      11/19/18   0410  11/20/18   0335  11/21/18   0234   NA  134*  135*  135*   K  4.2  3.6  4.3   CL  100  98  97*   CO2  27  28  25   BUN  20

## 2018-11-21 NOTE — PROGRESS NOTES
Wound Care  Follow up with patient today and Fidel Vick RN her nurse. Patient was cleaned with bathing wipes and repositioned in bed. Turned to right side. Noted coccyx erythema has decreased, coccyx Stage2,  2cm x 2cm <0.1cm, blanchable erythema noted. Zinc barrier cream buttocks, new sacral shaped mepilex dressing applied. Dressing [place]/date/timed. Noted patient has fractured ribs progress note 11/19/18 Dr. Anusha Hdez. Patient remains on hospital bed with turning and repositioning with mepilex dressing. Pressure ulcer prevention and wound care order set reviewed and up dated. Discussed with Fidel Vick RN patient nurse today.

## 2018-11-21 NOTE — PLAN OF CARE
Problem: Falls - Risk of:  Goal: Will remain free from falls  Will remain free from falls   Outcome: Met This Shift    Goal: Absence of physical injury  Absence of physical injury   Outcome: Met This Shift      Problem: Risk for Impaired Skin Integrity  Goal: Tissue integrity - skin and mucous membranes  Structural intactness and normal physiological function of skin and  mucous membranes.    Outcome: Met This Shift      Problem: Pain:  Goal: Pain level will decrease  Pain level will decrease   Outcome: Met This Shift    Goal: Control of acute pain  Control of acute pain   Outcome: Met This Shift    Goal: Control of chronic pain  Control of chronic pain   Outcome: Met This Shift      Problem: OXYGENATION/RESPIRATORY FUNCTION  Goal: Patient will maintain patent airway  Outcome: Met This Shift    Goal: Patient will achieve/maintain normal respiratory rate/effort  Respiratory rate and effort will be within normal limits for the patient   Outcome: Met This Shift

## 2018-11-21 NOTE — PROGRESS NOTES
Re-assessment completed, patient currently off of her dopamine, /55 MAP 67. Continues on amiodarone drip at this time. Patient continues to yell \"help me help me\"  When asking what she needs she states that she wants to go to bed when explained she's in a bed she states \"I am?\"  Then yells \"I Osito Corbin go home. \"  All efforts have seem to not be able to calm patient. Will monitor.  Electronically signed by Liz Clarke RN on 11/21/2018 at 12:58 AM'

## 2018-11-21 NOTE — PROGRESS NOTES
Physical assessment completed, see flow sheets. Patient is resting in bed with HOB elevated. Patient able to state that she is in [facility] and the month of November however disoriented to situation, patient thinks she can just get up and walk.   Electronically signed by Sana Fernandez RN on 11/20/2018 at 10:42 PM'

## 2018-11-21 NOTE — PROGRESS NOTES
systolic (congestive) and diastolic (congestive) heart failure (HCC)        Internal carotid artery stenosis, bilateral        Acute on chronic combined systolic and diastolic CHF (congestive heart failure) ([facility] [address]              Subjective:  Ms. Chele Tariq seen today awake and alert. Nursing staff informed me that Dobbhoff tube placement planned for today. Speech therapy to reevaluate. Rhythm remained stable. Has continued soreness in her chest after CPR the other day but no other types of chest pain reported. Dyspnea stable. No other complaints. Objective:   BP (!) 129/45   Pulse 71   Temp 97.7 °F (36.5 °C) (Temporal)   Resp 16   Ht 5' 4\" (1.626 m)   Wt 148 lb (67.1 kg)   LMP 03/15/1970 (Approximate)   SpO2 96%   BMI 25.40 kg/m²       Intake/Output Summary (Last 24 hours) at 11/21/18 0947  Last data filed at 11/21/18 0600   Gross per 24 hour   Intake            547.1 ml   Output              745 ml   Net           -197.9 ml       Prior to Admission medications    Medication Sig Start Date End Date Taking? Authorizing Provider   Dextromethorphan-Guaifenesin (DIABETIC TUSSIN DM PO) Take by mouth 2 times daily   Yes Historical Provider, MD   gabapentin (NEURONTIN) 100 MG capsule Take 100 mg by mouth 2 times daily. Take 1 100mg tablet in AM and 3 100mg tablets at bedtime .    Yes Historical Provider, MD   insulin glargine (BASAGLAR KWIKPEN) 100 UNIT/ML injection pen Inject 20 Units into the skin nightly   Yes Historical Provider, MD   bumetanide (BUMEX) 1 MG tablet Take 2 mg by mouth daily   Yes Historical Provider, MD   carvedilol (COREG) 12.5 MG tablet Take 1 tablet by mouth 2 times daily (with meals) 11/1/18  Yes Gale Hernandez MD   metolazone (ZAROXOLYN) 2.5 MG tablet Take 1 tablet by mouth as needed (only use up to twice a week) 11/1/18  Yes Gale Hernandez MD   cilostazol (PLETAL) 100 MG tablet Take 1 tablet by mouth 2 times daily 9/19/18  Yes Jorge Stoll MD   iron polysaccharides (NIFEREX) Portable    Result Date: 11/6/2018  XR CHEST PORTABLE 11/6/2018 3:45 PM HISTORY: Shortness of breath COMPARISON: 9/13/2018. FINDINGS: Frontal view of the chest was obtained. Opacities in the left lower lobe are seen. Pulmonary vascularity is slightly increased. The cardiac silhouette remains enlarged. There is atherosclerosis in the aorta. The osseous structures and surrounding soft tissues demonstrate no acute abnormality. 1. Pulmonary vascular congestion and small left pleural effusion. Signed by Dr Amanda Leslie on 11/6/2018 8:01 PM        Assessment:  1. Cardiac arrest 11/14/18 during dialysis secondary to polymorphic ventricular tachycardia etiology not determined at this time  1. Congestive heart failure stable  2. Chronic kidney disease with BUN 52 creatinine 2.4  3. Peripheral arterial disease severe  4. Anemia with hemoglobin 9.1  5. Diabetes  6. Hypertension  7. Superior mesenteric artery stenosis  8. Carotid artery stenosis  9. History of CVA  10. Acute respiratory failure requiring mechanical ventilatory support since 11/14/18  11. Dialysis instituted  12. Abnormal chest x-ray small pleural effusion  13. Extubated 11/18/18    Plan:  1. Continue current treatment  2. Proceed with Dobbhoff tube placement as indicated speech therapy to reevaluate  3. After further discussion with the nursing staff will go ahead and discontinue amiodarone for now continue to monitor carefully  4.  Will request possible PICC line placement and subsequently discontinue right femoral central line placement    Wei Man MD 11/21/2018 9:47 AM

## 2018-11-21 NOTE — PROGRESS NOTES
Speech Language Pathology  Facility/Department: [facility] CORONARY CARE UNIT  SWALLOW THERAPY     NAME: Lang Meadows  : 1940  MRN: [id number]    ADMISSION DATE: 2018  ADMITTING DIAGNOSIS: has Hypertension; Atherosclerosis of native artery of extremity with intermittent claudication ([address] Diabetes mellitus ([address] Diabetic vasculopathy ([address] Superior mesenteric artery stenosis [facility]); CVA (cerebral vascular accident) ([address] Carotid bruit; Ataxia; Atherosclerosis of native artery of both lower extremities with intermittent claudication ([address] Nonhealing ulcer of right lower leg limited to breakdown of skin ([address] Diabetic ulcer of left foot associated with diabetes mellitus due to underlying condition ([address] Skin ulcer of second toe of left foot with fat layer exposed ([address] Hypoglycemia; Chronic combined systolic (congestive) and diastolic (congestive) heart failure ([address] Internal carotid artery stenosis, bilateral; Chronic kidney disease; and Acute on chronic combined systolic and diastolic CHF (congestive heart failure) ([address] on her problem list.    Date of Treat: 2018  Evaluating Therapist: Carol Forman    Current Diet level:  Current Diet : NPO  Current Liquid Diet : NPO    Reason for Referral  Lang Meadows was referred for a bedside swallow evaluation to assess the efficiency of her swallow function, identify signs and symptoms of aspiration and make recommendations regarding safe dietary consistencies, effective compensatory strategies, and safe eating environment. Impression  Observed patient's swallowing function. Patient exhibits decreased oral prep of even blended food consistency as well as sluggish, moderately decreased laryngeal elevation for swallow airway protection. It is noted that this date, patient exhibited frequent, delayed coughs with all consistencies presented during treatment session (ice, puree consistency, and honey thick H2O via spoon).      At this time, still recommend NPO status; consider alternative means of nutrition. If patient receives mouth care prior to intake, okay for ice chips FOR COMFORT. Treatment Plan  Requires SLP Intervention: Yes    Recommended Diet and Intervention  Diet Solids Recommendation: NPO  Liquid Consistency Recommendation: NPO  Therapeutic Interventions: Patient/Family education;Oral care; Therapeutic PO trials with SLP    Treatment/Goals  Timeframe for Short-term Goals: 1x/day for 3 days   Goal 1: Patient NPO   Goal 2: Patient staff will follow swallow recommendations. Goal 3: Patient will receive daily oral care, via staff, to decrease bacteria from the oral cavity. Goal 4: Re-assessment of swallow function for potential PO intake. General  Chart Reviewed: Yes  Behavior/Cognition: Alert;Confused; Requires cueing  Communication Observation:  (Very low, hoarse vocal quality was noted. Verbalizations did not pertain to topics and questions at hand. )  Follows Directions: Simple (With cues/prompts. )  Patient Positioning: Upright in bed  Consistencies Administered: Ice Chips;Puree; Honey - teaspoon    Observed patient's swallowing function with the following observations noted:    Oral Phase Dysfunction  Oral Phase: Exceptions  Oral Phase Dysfunction  Impaired Mastication: Ice chips;Puree (Patient exhibited decreased vertical and rotary jaw movement during oral prep of single ice chip trials and puree consistency trials presented by SLP.)  Oral Phase - Comment: Observed oral cavity with no significant mucus formulation noted. Completed oral care prior to initiation of swallows. Oral transit of single ice chip trials, puree consistency trials, and honey thick H2O trials (all administered via spoon by SLP) primarily measured 1-2 seconds in length. No puree consistency residue was observed post swallows.      Indicators of Pharyngeal Phase Dysfunction  Pharyngeal Phase: Exceptions  Indicators of Pharyngeal Phase Dysfunction  Decreased

## 2018-11-22 NOTE — PROGRESS NOTES
Recent Labs      11/20/18   0335  11/21/18   0234  11/22/18   0400   NA  135*  135*  136   K  3.6  4.3  3.9   CL  98  97*  96*   CO2  28  25  24   PHOS  2.8   --    --    BUN  13  21  18   CREATININE  1.6*  2.2*  1.9*     LIVER PROFILE: No results for input(s): AST, ALT, LIPASE, BILIDIR, BILITOT, ALKPHOS in the last 72 hours. Invalid input(s): AMYLASE,  ALB  PT/INR: No results for input(s): PROTIME, INR in the last 72 hours. APTT: No results for input(s): APTT in the last 72 hours. BNP:  No results for input(s): BNP in the last 72 hours. CK, CKMB, Troponin: @LABRCNT (CKTOTAL:3, CKMB:3, TROPONINI:3)@    IMAGING:  Us Renal Complete    Result Date: 11/11/2018  EXAMINATION:  US RENAL COMPLETE  11/11/2018 12:17 PM HISTORY: Renal failure, acute. High blood pressure. COMPARISON: 7/6/2017. TECHNIQUE: Grayscale and color flow imaging were performed. FINDINGS: There is a 5 mm renal stone in the right mid kidney. The right kidney measures 10.3 cm. The cortical thickness and echogenicity are normal. There is no hydronephrosis of the right kidney. The left kidney was obscured by bowel gas and could not be visualized for evaluation. The urinary bladder is decompressed and not well evaluated. 1. The left kidney was obscured by bowel gas and could not be evaluated. 2. The right kidney is normal in size with normal cortical thickness and echogenicity and no hydronephrosis. There is a 5 mm renal stone in the right mid kidney. Signed by Dr Rodriguez Kidney on 11/11/2018 12:19 PM    Xr Chest Portable    Result Date: 11/22/2018  EXAMINATION: XR CHEST PORTABLE 11/22/2018 11:40 AM HISTORY: Chest for Dobbhoff placement Single view the chest is obtained. Prior exam November 21, 2018 is provided for comparison. Dobbhoff feeding tube is satisfactorily positioned. Double-lumen right internal jugular catheter satisfactorily positioned.     Satisfactory placement of Dobbhoff feeding tube Signed by Dr Js Chan on 11/22/2018 11:40

## 2018-11-22 NOTE — PROGRESS NOTES
Nephrology ([address]) Progress Note    Patient:  Harley Gamez  YOB: 1940  Date of Service: 11/22/2018  MRN: [id number]   Acct: [de-identified]   Primary Care Physician: Nany Vazquez MD  Advance Directive: Full Code  Admit Date: 11/6/2018       Hospital Day: 12  Referring Provider: Amee Aguilar MD    Patient independently seen and examined, Chart, Consults, Notes, Operative notes, Labs, Cardiology, and Radiology studies reviewed as able. Subjective:  Harley Gamez is a 69 y.o. female  whom we were consulted for BALJINDER. She has type 2 DM, CHF (systolo-diastolic), HTN, CKD stage 4, followed at the renal clinic. Her latest GFR was 26 when last seen in Oct 2018. She was having increasing dyspnea and was seen by cardiology who recommended hospital admission for CHF exacerbation. Hospital course remarkable for treatment with diuretics intravenously leading to worsening of renal function. After long discussion with patient, she agrees to proceed with dialysis. She has baseline stage IV chronic kidney disease with GFR of 19 and her renal function continued to get worse. On November 14, patient underwent insertion of permacath followed by 1st dialysis treatment. A few minutes into treatment patient had cardiopulmonary arrest and was successfully resuscitated. This morning she was in NAD. She is alert and awake.   some confusion overnight. [facility] this am after several attempts.      Allergies:  Aspirin and Codeine    Medicines:  Current Facility-Administered Medications   Medication Dose Route Frequency Provider Last Rate Last Dose    ipratropium-albuterol (DUONEB) nebulizer solution 1 ampule  1 ampule Inhalation Q4H FRAN Ramsey   1 ampule at 11/22/18 1421    metoprolol (LOPRESSOR) injection 2.5 mg  2.5 mg Intravenous Q6H Nany Vazquez MD   2.5 mg at 11/22/18 1050    morphine injection 1 mg  1 mg Intravenous Q2H PRN Amee Aguilar MD 75 mg  75 mg Oral Daily Loli Walker MD   Stopped at 11/21/18 0900    cilostazol (PLETAL) tablet 100 mg  100 mg Oral BID Loli Walker MD   Stopped at 11/21/18 0900       Past Medical History:  Past Medical History:   Diagnosis Date    Arthritis     Atherosclerosis of native arteries of the extremities with intermittent claudication 9/22/2014    Cerebral artery occlusion with cerebral infarction [facility])     [facility]F [facility]congestive heart failure) [facility] [address]     Diabetes mellitus ([facility] [facility]     Diabetic vasculopathy ([facility] [address]     Hypertension     Kidney stone     Peripheral vascular disease ([facility] [address]     Superior mesenteric artery stenosis ([facility] [address] 9/23/2014       Past Surgical History:  Past Surgical History:   Procedure Laterality Date    APPENDECTOMY      CHOLECYSTECTOMY      COLONOSCOPY      FINGER TRIGGER RELEASE Right     HYSTERECTOMY  40 YEARS PRIOR    PARTIAL    KNEE SURGERY Right     TONSILLECTOMY         Family History  Family History   Problem Relation Age of Onset    Hypertension Father     Diabetes Father     Heart Failure Father     Stroke Mother     Cancer Mother     Diabetes Mother     Seizures Child        Social History  Social History     Social History    Marital status:      Spouse name: N/A    Number of children: 4    Years of education: 12     Occupational History    Not on file.      Social History Main Topics    Smoking status: Former Smoker     Packs/day: 0.50     Quit date: 9/10/2018    Smokeless tobacco: Former User    Alcohol use No    Drug use: No    Sexual activity: Not on file     Other Topics Concern    Not on file     Social History Narrative    Born [place]     8 yrs ago     once    Has 2 sons and 2 daughters    Worked at [employer] 27 yrs [employer] [address] 1/2 ppd    Denies alcohol consumption or substance usage    Physically sedentary    Quit driving 5/0/3645    Walks with walker         Review as needed  HD next in AM  D/w RN      Padmini Tyler MD  11/22/18  2:45 PM

## 2018-11-22 NOTE — PROGRESS NOTES
Follow up:    Pt  said she wanted to get up and take a bath then go home. Reoriented to environment and care. No family here. Report from nursing, and goal of dobbhoff placement for today. Pt says her \"back and butt hurt\"  Her nurse is aware and will administer pain med. Palliative Care to support pt and her POC.     Electronically signed by Brittany Oswald RN on 11/22/2018 at 9:43 AM

## 2018-11-22 NOTE — PROGRESS NOTES
assessment of bowel gas pattern due to field-of-view. 1. Gastric tube tip overlies distal stomach. Other lines and tubes as above. 2. Similar diffuse right pulmonary opacities. Possible right pleural effusion. Left lung with perihilar vascular prominence, no overt edema. 3. Calcified aortic atherosclerosis and prominent cardiac silhouette. Signed by Dr Caitlin Buck on 11/14/2018 5:02 PM    Xr Chest Portable    Result Date: 11/14/2018  XR CHEST PORTABLE 11/14/2018 11:45 AM HISTORY: Intubation COMPARISON: 11/6/2018. FINDINGS: An endotracheal tube is identified which appears in good position. Hazy densities project throughout the right hemithorax. The left lung is clear. No mediastinal shift. No pneumothorax. Cardiomegaly is present with mild central pulmonary congestion. A right neck dual-lumen central venous catheter is seen which appears to be in good position. 1. Endotracheal tube is in good position. 2. Patchy densities project throughout the right hemithorax. The left lung is relatively clear. Distribution would be unusual for pulmonary edema unless in a prolonged lateral decubitus position. This seems more likely related to atelectasis or perhaps pneumonia. Signed by Dr Ender Casanova on 11/14/2018 1:24 PM    Xr Chest Portable    Result Date: 11/6/2018  XR CHEST PORTABLE 11/6/2018 3:45 PM HISTORY: Shortness of breath COMPARISON: 9/13/2018. FINDINGS: Frontal view of the chest was obtained. Opacities in the left lower lobe are seen. Pulmonary vascularity is slightly increased. The cardiac silhouette remains enlarged. There is atherosclerosis in the aorta. The osseous structures and surrounding soft tissues demonstrate no acute abnormality. 1. Pulmonary vascular congestion and small left pleural effusion.  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

## 2018-11-23 ENCOUNTER — OUTSIDE FACILITY SERVICE (OUTPATIENT)
Dept: PULMONOLOGY | Facility: CLINIC | Age: 78
End: 2018-11-23

## 2018-11-23 ENCOUNTER — NURSE TRIAGE (OUTPATIENT)
Dept: CALL CENTER | Facility: HOSPITAL | Age: 78
End: 2018-11-23

## 2018-11-23 PROCEDURE — 99232 SBSQ HOSP IP/OBS MODERATE 35: CPT | Performed by: INTERNAL MEDICINE

## 2018-11-23 NOTE — PROGRESS NOTES
morphine injection 1 mg  1 mg Intravenous Q2H PRN Mona Maddox MD   1 mg at 11/23/18 1048    Or    morphine injection 2 mg  2 mg Intravenous Q2H PRN Mona Maddox MD   2 mg at 11/22/18 2022    bumetanide (BUMEX) injection 1 mg  1 mg Intravenous BID Ana Dunks, APRTUSHAR   1 mg at 11/23/18 1428    scopolamine (TRANSDERM-SCOP) transdermal patch 1 patch  1 patch Transdermal Q72H Mona Maddox MD   1 patch at 11/18/18 1413    heparin (porcine) injection 3,400 Units  3,400 Units Intercatheter PRN Francesca Champion MD   3,400 Units at 11/17/18 1613    0.9 % sodium chloride bolus  200 mL Intravenous PRN Francesca Champion MD        0.9 % sodium chloride bolus  100 mL Intravenous Q30 Min PRN Francesca Champion MD        albumin human 25 % solution 12.5 g  12.5 g Intravenous PRN Francesca Champion MD        phenol 1.4 % mouth spray 1 spray  1 spray Mouth/Throat Q2H PRN Rita Altamirano MD   1 spray at 11/18/18 2326    LORazepam (ATIVAN) injection 0.5 mg  0.5 mg Intravenous Q4H PRN Mona Maddox MD   0.5 mg at 11/23/18 0301    pantoprazole (PROTONIX) injection 40 mg  40 mg Intravenous BID Mona Maddox MD   40 mg at 11/23/18 1428    And    sodium chloride (PF) 0.9 % injection 10 mL  10 mL Intravenous Daily Mona Maddox MD   10 mL at 11/23/18 1439    heparin (porcine) injection 5,000 Units  5,000 Units Subcutaneous BID Emeline Babinski, MD   5,000 Units at 11/22/18 2023    insulin glargine (LANTUS) injection vial 15 Units  15 Units Subcutaneous Nightly Rita Altamirano MD   Stopped at 11/21/18 2236    vitamin D (ERGOCALCIFEROL) capsule 50,000 Units  50,000 Units Oral Weekly Tanner Ling MD   50,000 Units at 11/15/18 1135    calcitRIOL (ROCALTROL) capsule 0.25 mcg  0.25 mcg Oral Daily Tanner Ling MD   Stopped at 11/21/18 1059    acetaminophen (TYLENOL) tablet 650 mg  650 mg Oral Q4H PRN Mona Maddox MD   650 mg at 11/18/18 0805    glucose (GLUTOSE) 40 % oral gel 15 g  15 g Procedure Laterality Date    APPENDECTOMY      CHOLECYSTECTOMY      COLONOSCOPY      FINGER TRIGGER RELEASE Right     HYSTERECTOMY  40 YEARS PRIOR    PARTIAL    KNEE SURGERY Right     TONSILLECTOMY         Family History  Family History   Problem Relation Age of Onset    Hypertension Father     Diabetes Father     Heart Failure Father     Stroke Mother     Cancer Mother     Diabetes Mother     Seizures Child        Social History  Social History     Social History    Marital status:      Spouse name: N/A    Number of children: 4    Years of education: 12     Occupational History    Not on file. Social History Main Topics    Smoking status: Former Smoker     Packs/day: 0.50     Quit date: 9/10/2018    Smokeless tobacco: Former User    Alcohol use No    Drug use: No    Sexual activity: Not on file     Other Topics Concern    Not on file     Social History Narrative    Born [place]     8 yrs ago     once    Has 2 sons and 2 daughters    Worked at [employer] 27 yrs     Education [address] [employer] 1/2 ppd    Denies alcohol consumption or substance usage    Physically sedentary    Quit driving 6/3/4461    Walks with walker         Review of Systems:  History obtained from chart review and the patient  General ROS: No fever or chills  Respiratory ROS: No cough, shortness of breath, wheezing  Cardiovascular ROS: No chest pain or palpitations  Gastrointestinal ROS: No abdominal pain or melena  Genito-Urinary ROS: No dysuria or hematuria  Musculoskeletal ROS: No joint pain or swelling         Objective:  Blood pressure 130/66, pulse 82, temperature 97 °F (36.1 °C), temperature source Temporal, resp. rate 20, height 5' 4\" (1.626 m), weight 130 lb (59 kg), last menstrual period 03/15/1970, SpO2 96 %, not currently breastfeeding.     Intake/Output Summary (Last 24 hours) at 11/23/18 1440  Last data filed at 11/23/18 1112   Gross per 24 hour the Radiologist  Radiology: Us Renal Complete    Result Date: 11/11/2018  EXAMINATION:  US RENAL COMPLETE  11/11/2018 12:17 PM HISTORY: Renal failure, acute. High blood pressure. COMPARISON: 7/6/2017. TECHNIQUE: Grayscale and color flow imaging were performed. FINDINGS: There is a 5 mm renal stone in the right mid kidney. The right kidney measures 10.3 cm. The cortical thickness and echogenicity are normal. There is no hydronephrosis of the right kidney. The left kidney was obscured by bowel gas and could not be visualized for evaluation. The urinary bladder is decompressed and not well evaluated. 1. The left kidney was obscured by bowel gas and could not be evaluated. 2. The right kidney is normal in size with normal cortical thickness and echogenicity and no hydronephrosis. There is a 5 mm renal stone in the right mid kidney. Signed by Dr Candelario Parker on 11/11/2018 12:19 PM    Xr Chest Portable    Result Date: 11/18/2018  XR CHEST PORTABLE 11/17/2018 11:15 PM HISTORY: Mechanical ventilation Comparison: 11/17/2018 Findings: Lines and tubes are stable in position. Bilateral layering pleural effusions, moderate in size, with obscured lung bases. Reidentified cardiomegaly with central pulmonary congestion. Interstitial prominence suggests interstitial edema. No pneumothorax. No acute osseous or soft tissue abnormality is noted. Impression: 1. No significant interval change since previous exam. Reidentified cardiomegaly with pulmonary congestion and interstitial edema. Essentially stable bilateral layering pleural effusions. Signed by Dr Jeremy Sims on 11/18/2018 7:53 AM    Xr Chest Portable    Result Date: 11/17/2018  XR CHEST PORTABLE 11/16/2018 11:15 PM HISTORY: Mechanical ventilation Comparison: 1/16/2018 Findings: Lines and tubes are stable in position. Reidentified bilateral layering pleural effusions with obscuration of the lung bases, appearing stable. No increasing consolidation. No pneumothorax.

## 2018-11-23 NOTE — PROGRESS NOTES
dubhoff placed. cxr confirms correct position. Tube feeding initiated.    Electronically signed by Edith Lehman RN on 11/23/2018 at 4:39 PM

## 2018-11-23 NOTE — TELEPHONE ENCOUNTER
"Ashley needing to talk to Dr. Sterling about in House pt. At [facility]     Reason for Disposition  • RN needs further essential information from caller in order to complete triage  • Call about patient who is currently hospitalized    Additional Information  • Negative: [1] Caller is not with the adult (patient) AND [2] reporting urgent symptoms  • Negative: Lab result questions  • Negative: Medication questions  • Negative: Caller cannot be reached by phone  • Negative: Caller has already spoken to PCP or another triager  • Negative: Lab calling with strep throat test results and triager can call in prescription  • Negative: Lab calling with urinalysis test results and triager can call in prescription  • Negative: Medication questions  • Negative: ED call to PCP  • Negative: Physician call to PCP    Answer Assessment - Initial Assessment Questions  1. REASON FOR CALL or QUESTION: \"What is your reason for calling today?\" or \"How can I best help you?\" or \"What question do you have that I can help answer?\"      Ashley Nurse trying to to get hold Dr. Sterling  About in house pt.    Answer Assessment - Initial Assessment Questions  1. REASON FOR CALL or QUESTION: \"What is your reason for calling today?\" or \"How can I best  help you?\" or \"What question do you have that I can help answer?\"      Ashley Nurse calling needing to talk with Dr. Sterling  2. CALLER: Document the source of call. (e.g., laboratory, patient).      Ashley Nurse    Protocols used: INFORMATION ONLY CALL-ADULT-, PCP CALL - NO TRIAGE-ADULT-      "

## 2018-11-24 ENCOUNTER — OUTSIDE FACILITY SERVICE (OUTPATIENT)
Dept: PULMONOLOGY | Facility: CLINIC | Age: 78
End: 2018-11-24

## 2018-11-24 PROCEDURE — 99232 SBSQ HOSP IP/OBS MODERATE 35: CPT | Performed by: INTERNAL MEDICINE

## 2018-11-24 NOTE — PROGRESS NOTES
General: awake/alert   Chest:  Coarse rhonchi bilat  CVS: regular rate and rhythm  Abdominal: soft, nontender, normal bowel sounds  Extremities: no cyanosis or edema  Skin: warm and dry without rash    Labs:  BMP:   Recent Labs      11/22/18   0400  11/23/18   0143  11/24/18   0305   NA  136  139  143   K  3.9  3.7  3.6   CL  96*  97*  102   CO2  24  25  25   BUN  18  28*  24*   CREATININE  1.9*  2.4*  1.8*   CALCIUM  8.1*  8.2*  8.5*     CBC:   No results for input(s): WBC, HGB, HCT, MCV, PLT in the last 72 hours. LIVER PROFILE:   No results for input(s): AST, ALT, LIPASE, BILIDIR, BILITOT, ALKPHOS in the last 72 hours. Invalid input(s): AMYLASE,  ALB  PT/INR:   No results for input(s): PROTIME, INR in the last 72 hours. APTT:   No results for input(s): APTT in the last 72 hours. BNP:  No results for input(s): BNP in the last 72 hours. Ionized Calcium:No results for input(s): IONCA in the last 72 hours. Magnesium:  No results for input(s): MG in the last 72 hours. Phosphorus:  No results for input(s): PHOS in the last 72 hours. HgbA1C: No results for input(s): LABA1C in the last 72 hours. Hepatic:   No results for input(s): ALKPHOS, ALT, AST, PROT, BILITOT, BILIDIR, LABALBU in the last 72 hours. Lactic Acid: No results for input(s): LACTA in the last 72 hours. Troponin: No results for input(s): CKTOTAL, CKMB, TROPONINT in the last 72 hours. ABGs:   Lab Results   Component Value Date    PHART 7.460 11/18/2018    PO2ART 67.0 11/18/2018    FJY2VOV 33.0 11/18/2018     CRP:  No results for input(s): CRP in the last 72 hours. Sed Rate:  No results for input(s): SEDRATE in the last 72 hours. Culture Results:   Blood Culture Recent: No results for input(s): BC in the last 720 hours.     Urine Culture Recent :   Recent Labs      11/06/18   1300   LABURIN  >100,000 CFU/ml*  Heavy growth       Radiology reports as per the Radiologist  Radiology: Us Renal Complete    Result Date: 11/11/2018  EXAMINATION: US RENAL COMPLETE  11/11/2018 12:17 PM HISTORY: Renal failure, acute. High blood pressure. COMPARISON: 7/6/2017. TECHNIQUE: Grayscale and color flow imaging were performed. FINDINGS: There is a 5 mm renal stone in the right mid kidney. The right kidney measures 10.3 cm. The cortical thickness and echogenicity are normal. There is no hydronephrosis of the right kidney. The left kidney was obscured by bowel gas and could not be visualized for evaluation. The urinary bladder is decompressed and not well evaluated. 1. The left kidney was obscured by bowel gas and could not be evaluated. 2. The right kidney is normal in size with normal cortical thickness and echogenicity and no hydronephrosis. There is a 5 mm renal stone in the right mid kidney. Signed by Dr Candelario Parker on 11/11/2018 12:19 PM    Xr Chest Portable    Result Date: 11/18/2018  XR CHEST PORTABLE 11/17/2018 11:15 PM HISTORY: Mechanical ventilation Comparison: 11/17/2018 Findings: Lines and tubes are stable in position. Bilateral layering pleural effusions, moderate in size, with obscured lung bases. Reidentified cardiomegaly with central pulmonary congestion. Interstitial prominence suggests interstitial edema. No pneumothorax. No acute osseous or soft tissue abnormality is noted. Impression: 1. No significant interval change since previous exam. Reidentified cardiomegaly with pulmonary congestion and interstitial edema. Essentially stable bilateral layering pleural effusions. Signed by Dr Jeremy Sims on 11/18/2018 7:53 AM    Xr Chest Portable    Result Date: 11/17/2018  XR CHEST PORTABLE 11/16/2018 11:15 PM HISTORY: Mechanical ventilation Comparison: 1/16/2018 Findings: Lines and tubes are stable in position. Reidentified bilateral layering pleural effusions with obscuration of the lung bases, appearing stable. No increasing consolidation. No pneumothorax. Cardiomegaly and central pulmonary congestion are unchanged.    No acute osseous or soft good position. The osseous structures and surrounding soft tissues demonstrate no acute abnormality. 1. Endotracheal tube is in stable position. Increasing atelectasis in the left lung base. 2. Stable hazy opacification of the right lung, especially the right lung base. This may relate to underlying right pleural effusion. Signed by Dr Sherial Goldberg on 11/15/2018 7:43 AM    Xr Chest Portable    Result Date: 11/14/2018  EXAM: XR CHEST PORTABLE -- 11/14/2018 3:45 PM HISTORY: 78 years, Female, post gastric tube insertion COMPARISON: 11/14/2018 at 1246 TECHNIQUE:  4 images. Portable frontal view of the chest. FINDINGS:  Gastric tube tip overlies the distal stomach. Endotracheal tube tip projects over the mid trachea. Large bore central venous catheter tip overlies upper cavoatrial junction. Lung apices are excluded from view. Similar right diffuse pulmonary opacity. Left lung with perihilar vascular prominence, no overt edema. Possible right pleural effusion. Calcified aortic atherosclerosis. Prominent cardiac silhouette. Right-sided rib fractures. Limited assessment of bowel gas pattern due to field-of-view. 1. Gastric tube tip overlies distal stomach. Other lines and tubes as above. 2. Similar diffuse right pulmonary opacities. Possible right pleural effusion. Left lung with perihilar vascular prominence, no overt edema. 3. Calcified aortic atherosclerosis and prominent cardiac silhouette. Signed by Dr Primo Knox on 11/14/2018 5:02 PM    Xr Chest Portable    Result Date: 11/14/2018  XR CHEST PORTABLE 11/14/2018 11:45 AM HISTORY: Intubation COMPARISON: 11/6/2018. FINDINGS: An endotracheal tube is identified which appears in good position. Hazy densities project throughout the right hemithorax. The left lung is clear. No mediastinal shift. No pneumothorax. Cardiomegaly is present with mild central pulmonary congestion.  A right neck dual-lumen central venous catheter is seen which appears to be in good position. 1. Endotracheal tube is in good position. 2. Patchy densities project throughout the right hemithorax. The left lung is relatively clear. Distribution would be unusual for pulmonary edema unless in a prolonged lateral decubitus position. This seems more likely related to atelectasis or perhaps pneumonia. Signed by Dr Quiana Nelson on 11/14/2018 1:24 PM    Xr Chest Portable    Result Date: 11/6/2018  XR CHEST PORTABLE 11/6/2018 3:45 PM HISTORY: Shortness of breath COMPARISON: 9/13/2018. FINDINGS: Frontal view of the chest was obtained. Opacities in the left lower lobe are seen. Pulmonary vascularity is slightly increased. The cardiac silhouette remains enlarged. There is atherosclerosis in the aorta. The osseous structures and surrounding soft tissues demonstrate no acute abnormality. 1. Pulmonary vascular congestion and small left pleural effusion.  Signed by Dr Mariaa Fong on 11/6/2018 8:01 PM       Assessment   BALJINDER / cardiorenal  CKD 4  Recent cardiopulmonary arrest  DM2 with nephropathy on insulin  Anemia CKD  Chronic diastolic CHF  Secondary hyperparathyroidism      Plan:  RRT as needed  HD MWF  D/w RN  JOSÉ Piper MD  11/24/18  1:16 PM

## 2018-11-24 NOTE — PROGRESS NOTES
[facility]  French Jung MD  FOR DR. Vanessa Primrose    Subjective:  LOS: 18    Awake and tries to talk. Very hoarse. Weak weak cough. No distress. Remains on face mask for hypoxia. Objective   Vital Signs   BP (!) 159/71   Pulse 85   Temp 97 °F (36.1 °C) (Temporal)   Resp 18   Ht 5' 4\" (1.626 m)   Wt 128 lb 3.2 oz (58.2 kg)   LMP 03/15/1970 (Approximate)   SpO2 93%   BMI 22.01 kg/m²     Intake/Output Summary (Last 24 hours) at 11/24/18 1513  Last data filed at 11/24/18 0531   Gross per 24 hour   Intake              519 ml   Output              100 ml   Net              419 ml     Physical Exam   HENT:   Head: Normocephalic. Eyes: Pupils are equal, round, and reactive to light. Cardiovascular: Normal rate and regular rhythm. No murmur heard. Pulmonary/Chest: No respiratory distress. She has decreased breath sounds. She has no wheezes. She has rhonchi (R>L today). She has no rales. Coarse breath sounds bilaterally   Abdominal: Soft. Bowel sounds are normal. She exhibits no mass. There is no tenderness. Musculoskeletal: She exhibits no edema. Neurological: She is alert. Skin: Skin is warm and dry. Results Review:    I have reviewed the laboratory and imaging data since the last note by [facility] physician. My annotations are noted in assessment and plan. Medication Review:  I have reviewed the current MAR. My annotations are noted in assessment and plan.  dextrose Stopped (11/19/18 1908)     Plan   PCCM Problems  Acute respiratory failure, hypoxia  Fluid overload, improved with HD  Weak clearance of secretions  Rib fractures  Resuscitated cardiac arrest  Recent ex-smoker, likely COPD  Relevant Medical Diagnoses  ESRD  Dysphagia  CHF  Cardiomyopathy, EF 35%  DM  CAD  Anemia  PVD  Hx CVA      Plan of Treatment  Remains with face mask instead of nasal cannula for hypoxia. Sats adequate. Weak cough. On nebs, added flutter valve though may be cognitively unable.  Try mucomyst

## 2018-11-24 NOTE — PROGRESS NOTES
128 lb 3.2 oz (58.2 kg)   LMP 03/15/1970 (Approximate)   SpO2 95%   BMI 22.01 kg/m²   General appearance: alert and cooperative with exam  Lungs: Bibasilar rales  Heart: regular rate and rhythm, S1, S2 normal, no murmur, click, rub or gallop  Abdomen: soft, non-tender; bowel sounds normal; no masses,  no organomegaly  Extremities: Bilateral 1+edema  Neurologic: No obvious focal neurologic deficits. Assessment and Plan:   1. Acute resp failure, now on the ventilator: She seems to be better and more alert. 2. A/C diastolic and systolic CHF: Per cardiology and nephrology. Still diuresing some. 3. Arrhythmias: Per cardiology/attending. 4. CKD 4 with BALJINDER: Per nephrology. The numbers are worse. Renal is seeing her. 5. Stage 2 sacral Decub: Wound care seeing her. Nutritional status is contributing. 6. Nutrition: Dobhoff placed with tube feeding going. 7. Hypokalemia: now normal.  8. Diabetes: Continue the accuchecks and SSI. 9. UTI: She has completed the course of antibiotic. Advance Directive: Full Code  DVT prophylaxis with heparin. Discharge planning: ? Active Problems:    Acute on chronic combined systolic and diastolic CHF (congestive heart failure) ([address]  Resolved Problems:    * No resolved hospital problems.  Dolly Shelton MD

## 2018-11-25 ENCOUNTER — OUTSIDE FACILITY SERVICE (OUTPATIENT)
Dept: PULMONOLOGY | Facility: CLINIC | Age: 78
End: 2018-11-25

## 2018-11-25 PROCEDURE — 99232 SBSQ HOSP IP/OBS MODERATE 35: CPT | Performed by: INTERNAL MEDICINE

## 2018-11-25 NOTE — PROGRESS NOTES
in the last 720 hours. Urine Culture Recent :   Recent Labs      11/06/18   1300   LABURIN  >100,000 CFU/ml*  Heavy growth       Radiology reports as per the Radiologist  Radiology: Us Renal Complete    Result Date: 11/11/2018  EXAMINATION:  US RENAL COMPLETE  11/11/2018 12:17 PM HISTORY: Renal failure, acute. High blood pressure. COMPARISON: 7/6/2017. TECHNIQUE: Grayscale and color flow imaging were performed. FINDINGS: There is a 5 mm renal stone in the right mid kidney. The right kidney measures 10.3 cm. The cortical thickness and echogenicity are normal. There is no hydronephrosis of the right kidney. The left kidney was obscured by bowel gas and could not be visualized for evaluation. The urinary bladder is decompressed and not well evaluated. 1. The left kidney was obscured by bowel gas and could not be evaluated. 2. The right kidney is normal in size with normal cortical thickness and echogenicity and no hydronephrosis. There is a 5 mm renal stone in the right mid kidney. Signed by Dr Delton Soulier on 11/11/2018 12:19 PM    Xr Chest Portable    Result Date: 11/18/2018  XR CHEST PORTABLE 11/17/2018 11:15 PM HISTORY: Mechanical ventilation Comparison: 11/17/2018 Findings: Lines and tubes are stable in position. Bilateral layering pleural effusions, moderate in size, with obscured lung bases. Reidentified cardiomegaly with central pulmonary congestion. Interstitial prominence suggests interstitial edema. No pneumothorax. No acute osseous or soft tissue abnormality is noted. Impression: 1. No significant interval change since previous exam. Reidentified cardiomegaly with pulmonary congestion and interstitial edema. Essentially stable bilateral layering pleural effusions.  Signed by Dr Jayna Samuels on 11/18/2018 7:53 AM    Xr Chest Portable    Result Date: 11/17/2018  XR CHEST PORTABLE 11/16/2018 11:15 PM HISTORY: Mechanical ventilation Comparison: 1/16/2018 Findings: Lines and tubes are stable in

## 2018-11-25 NOTE — PROGRESS NOTES
[facility]  Maritza Santana MD  FOR DR. Ness Garcia    Subjective:  LOS: 19    About the same. Weak cough and states still some chest pain from rib fractures. Objective   Vital Signs   /63   Pulse 88   Temp 99.1 °F (37.3 °C) (Temporal)   Resp 16   Ht 5' 4\" (1.626 m)   Wt 128 lb 3.2 oz (58.2 kg)   LMP 03/15/1970 (Approximate)   SpO2 95%   BMI 22.01 kg/m²     Intake/Output Summary (Last 24 hours) at 11/25/18 1626  Last data filed at 11/25/18 1346   Gross per 24 hour   Intake             1222 ml   Output             1000 ml   Net              222 ml     Physical Exam   HENT:   Head: Normocephalic. Eyes: Pupils are equal, round, and reactive to light. Cardiovascular: Normal rate and regular rhythm. No murmur heard. Pulmonary/Chest: No respiratory distress. She has decreased breath sounds. She has no wheezes. She has rhonchi (bilateral scattered). She has no rales. Coarse breath sounds bilaterally   Abdominal: Soft. Bowel sounds are normal. She exhibits no mass. There is no tenderness. Musculoskeletal: She exhibits no edema. Neurological: She is alert. Skin: Skin is warm and dry. Results Review:    I have reviewed the laboratory and imaging data since the last note by [facility] physician. My annotations are noted in assessment and plan. Medication Review:  I have reviewed the current MAR. My annotations are noted in assessment and plan.  dextrose Stopped (11/19/18 1908)     Plan   PCCM Problems  Acute respiratory failure, hypoxia  Fluid overload, improved with HD  Weak clearance of secretions  Rib fractures  Resuscitated cardiac arrest  Recent ex-smoker, likely COPD  Relevant Medical Diagnoses  ESRD  Dysphagia  CHF  Cardiomyopathy, EF 35%  DM  CAD  Anemia  PVD  Hx CVA      Plan of Treatment  Remains with face mask instead of nasal cannula for hypoxia. Sats adequate. Weak cough. On nebs with mucomyst nebs.     Fluid overload with HD per renal. Cardiology also following

## 2018-11-26 ENCOUNTER — OUTSIDE FACILITY SERVICE (OUTPATIENT)
Dept: PULMONOLOGY | Facility: CLINIC | Age: 78
End: 2018-11-26

## 2018-11-26 PROCEDURE — 99232 SBSQ HOSP IP/OBS MODERATE 35: CPT | Performed by: INTERNAL MEDICINE

## 2018-11-26 NOTE — CONSULTS
Palliative Care Consult Note  11/26/2018 11:55 AM  Subjective:   Admit Date: 11/6/2018  PCP: Ayaka Red MD    Reason For Consult: Goals of Care    Requesting Physician: Dr. Ida Maurice    History Obtained From: Nursing, EMR    Chief Complaint: Dyspnea    History of Present Illness: Patient is a 69 yr old female that was directly admitted to the hospital on 11/06/2018 after her appointment with Dr. Delroy Martinez due to symptoms of worsening dyspnea, weight gain, and pitting edema to bilateral extremities. She was found to be in acute on chronic combined systolic and diastolic heart failure and was admitted to PCU for IV diuresis. She was receiving home health, was enrolled in the HF clinic, and is noted to have been noncompliant with diet, fluid intake, and had been deteriorating but refused to seek treatment until her visit with Dr. Delroy Martinez. She also has a significant PMH of CKD stage IV, DM, HTN, PVD, ischemic heart disease, and previous CVA. Upon admission, BNP was elevated, GFR 20, creatinine 2.4, urine culture with probable UTI, and CXR with pulmonary vascular congestion and small left pleural effusion. Cardiology and nephrology following the patient closely and she was started on IV abx, IV diuretics and had some improvement but renal function began to decline. Nephrology had recommended dialysis and patient had initially refused but did eventually agree and had Permacth placed on 11/14/18. She underwent her first dialysis tx on 11/14/18, noted to have VT/VF heart rhythm, underwent full resuscitative measures and was intubated in dialysis and transferred to CCU. Patient was noted to have broken right ribs post resuscitation and remained intubated and mechanically ventilated, SLED initiated while in CCU, and was successfully extubated on 11/18/2018.  She has continued on SLED (transitioned to intermittent HD 11/21/18), has continued to require supplemental oxygen, and Dobhoff tube was placed on 11/22/18

## 2018-11-26 NOTE — PROGRESS NOTES
overload with congestive failure and increasing right pleural effusion. .  Signed by Dr Edgar Bernal on 11/21/2018 6:08 AM    Xr Chest Portable    Result Date: 11/18/2018  XR CHEST PORTABLE 11/17/2018 11:15 PM HISTORY: Mechanical ventilation Comparison: 11/17/2018 Findings: Lines and tubes are stable in position. Bilateral layering pleural effusions, moderate in size, with obscured lung bases. Reidentified cardiomegaly with central pulmonary congestion. Interstitial prominence suggests interstitial edema. No pneumothorax. No acute osseous or soft tissue abnormality is noted. Impression: 1. No significant interval change since previous exam. Reidentified cardiomegaly with pulmonary congestion and interstitial edema. Essentially stable bilateral layering pleural effusions. Signed by Dr Dominick Vick on 11/18/2018 7:53 AM    Xr Chest Portable    Result Date: 11/17/2018  XR CHEST PORTABLE 11/16/2018 11:15 PM HISTORY: Mechanical ventilation Comparison: 1/16/2018 Findings: Lines and tubes are stable in position. Reidentified bilateral layering pleural effusions with obscuration of the lung bases, appearing stable. No increasing consolidation. No pneumothorax. Cardiomegaly and central pulmonary congestion are unchanged. No acute osseous or soft tissue abnormality is noted. Impression: 1. No significant interval change from the previous exam. Signed by Dr Dominick Vick on 11/17/2018 7:28 AM    Xr Chest Portable    Result Date: 11/16/2018  EXAM: XR CHEST PORTABLE -- 11/15/2018 11:15 PM HISTORY: 78 years, Female, intubation COMPARISON: 11/15/2018 TECHNIQUE:  1 images. Frontal view of the chest. FINDINGS:  Endotracheal tube tip projects over the mid trachea. Gastric tube tip traverses the diaphragm at midline, tip outside the field of view. Right dialysis catheter tip overlies upper right atrium. No pneumothorax. Bilateral pleural effusions and bibasilar opacities, mildly increased on the right.  There is perihilar vascular prominence and interstitial prominence. Prominent cardiac silhouette. Calcified aortic atherosclerosis. Upper mediastinum within normal limits. Limited assessment of bones and partially visualized upper abdomen due to technique. Calcified carotid atherosclerosis. 1. Bilateral pleural effusions and bibasilar opacities, mildly increased on the right. Similar perihilar vascular and interstitial prominence. These findings could represent edema and/or infection. 2. Prominent cardiac silhouette. 3. Calcified aortic and carotid atherosclerosis. 4. Adequate radiographic position of lines and tubes, described in detail above. Signed by Dr Jose Raul Tamez on 11/16/2018 7:51 AM    Xr Chest Portable    Result Date: 11/15/2018  XR CHEST PORTABLE 11/14/2018 11:15 PM HISTORY: Mechanical ventilation COMPARISON: 11/14/2018. FINDINGS: Endotracheal tube is in good position. Increasing atelectasis in the left base with completely obscured left hemidiaphragm. Hazy opacification of the right lung persists, more prominent in the lung base. Heart size is upper limit of normal. The pulmonary vasculature seem appropriate. No pneumothorax. An enteric tube courses below the diaphragm with tip beyond the field-of-view. Right IJ central line identified which is in good position. The osseous structures and surrounding soft tissues demonstrate no acute abnormality. 1. Endotracheal tube is in stable position. Increasing atelectasis in the left lung base. 2. Stable hazy opacification of the right lung, especially the right lung base. This may relate to underlying right pleural effusion. Signed by Dr Hermelinda Ornoa on 11/15/2018 7:43 AM    Xr Chest Portable    Result Date: 11/14/2018  EXAM: XR CHEST PORTABLE -- 11/14/2018 3:45 PM HISTORY: 78 years, Female, post gastric tube insertion COMPARISON: 11/14/2018 at 1246 TECHNIQUE:  4 images. Portable frontal view of the chest. FINDINGS:  Gastric tube tip overlies the distal stomach. Endotracheal tube tip projects over the mid trachea. Large bore central venous catheter tip overlies upper cavoatrial junction. Lung apices are excluded from view. Similar right diffuse pulmonary opacity. Left lung with perihilar vascular prominence, no overt edema. Possible right pleural effusion. Calcified aortic atherosclerosis. Prominent cardiac silhouette. Right-sided rib fractures. Limited assessment of bowel gas pattern due to field-of-view. 1. Gastric tube tip overlies distal stomach. Other lines and tubes as above. 2. Similar diffuse right pulmonary opacities. Possible right pleural effusion. Left lung with perihilar vascular prominence, no overt edema. 3. Calcified aortic atherosclerosis and prominent cardiac silhouette. Signed by Dr Ethan Ayala on 11/14/2018 5:02 PM    Xr Chest Portable    Result Date: 11/14/2018  XR CHEST PORTABLE 11/14/2018 11:45 AM HISTORY: Intubation COMPARISON: 11/6/2018. FINDINGS: An endotracheal tube is identified which appears in good position. Hazy densities project throughout the right hemithorax. The left lung is clear. No mediastinal shift. No pneumothorax. Cardiomegaly is present with mild central pulmonary congestion. A right neck dual-lumen central venous catheter is seen which appears to be in good position. 1. Endotracheal tube is in good position. 2. Patchy densities project throughout the right hemithorax. The left lung is relatively clear. Distribution would be unusual for pulmonary edema unless in a prolonged lateral decubitus position. This seems more likely related to atelectasis or perhaps pneumonia. Signed by Dr Christina Parrish on 11/14/2018 1:24 PM    Xr Chest Portable    Result Date: 11/6/2018  XR CHEST PORTABLE 11/6/2018 3:45 PM HISTORY: Shortness of breath COMPARISON: 9/13/2018. FINDINGS: Frontal view of the chest was obtained. Opacities in the left lower lobe are seen. Pulmonary vascularity is slightly increased.  The cardiac silhouette remains

## 2018-11-26 NOTE — PROGRESS NOTES
Progress Note  11/26/2018 6:14 AM  Subjective:   Admit Date: 11/6/2018  PCP: Nick Barragan MD    Interval History: She is in the PCU and she is having more chest congestion. I spoke to Darío Diallo, and she then spoke to her brother and they agree to a DNR. DIET TUBE FEED CONTINUOUS/CYCLIC NPO; Renal Formula (Nepro); Nasogastric; Continuous; 15; 36; 24; Exceptions are: Sips with Meds    Intake/Output Summary (Last 24 hours) at 11/26/18 0614  Last data filed at 11/26/18 0532   Gross per 24 hour   Intake             1478 ml   Output              375 ml   Net             1103 ml     Medications:      dextrose Stopped (11/19/18 1908)      potassium chloride  20 mEq Oral Daily    acetylcysteine  600 mg Inhalation TID    ipratropium-albuterol  1 ampule Inhalation Q4H    metoprolol  2.5 mg Intravenous Q6H    bumetanide  1 mg Intravenous BID    scopolamine  1 patch Transdermal Q72H    pantoprazole  40 mg Intravenous BID    And    sodium chloride (PF)  10 mL Intravenous Daily    heparin (porcine)  5,000 Units Subcutaneous BID    insulin glargine  15 Units Subcutaneous Nightly    vitamin D  50,000 Units Oral Weekly    calcitRIOL  0.25 mcg Oral Daily    insulin lispro  0-12 Units Subcutaneous TID WC    insulin lispro  0-6 Units Subcutaneous Nightly    sodium chloride flush  10 mL Intravenous 2 times per day    influenza virus vaccine  0.5 mL Intramuscular Once    clopidogrel  75 mg Oral Daily    cilostazol  100 mg Oral BID     No results for input(s): WBC, HGB, PLT in the last 72 hours.   Recent Labs      11/24/18   0305  11/25/18   0221 11/26/18   0115   NA  143  143  148*   K  3.6  3.3*  3.6   CL  102  102  105   CO2  25  27  26   BUN  24*  40*  60*   CREATININE  1.8*  2.4*  3.1*   GLUCOSE  188*  247*  247*     Recent Labs      11/25/18 0221   AST  12   ALT  13   BILITOT  0.5   ALKPHOS  134*       Objective:   Vitals: BP (!) 149/71   Pulse 88   Temp 99.4 °F (37.4 °C) (Temporal)   Resp 24

## 2018-11-26 NOTE — PROGRESS NOTES
Renal  · Rate (ml/hr):goal rate 36ml    · Volume (ml/day): 864 ml  · Duration: Continuous  · Additives/Modulars:    · Water Flushes:  (25ml/hour)  · Current TF & Flush Orders Provides: 36ml = 1555 kcals with 69g protein, 139 g CHO and 628ml free water from formula and 600ml from free water flush  · Goal TF & Flush Orders Provides: 36ml = 1555 kcals with 69g protein, 139 g CHO and 628ml free water from formula and 600ml from free water flush    · Anthropometric Measures:  · Ht: 5' 4\" (162.6 cm)   · Current Body Wt: 135 lb 8 oz (61.5 kg)  · Admission Body Wt: 148 lb (67.1 kg)  · Usual Body Wt: 122 lb (55.3 kg) (9/2018)  · Ideal Body Wt: 120 lb (54.4 kg),   · BMI Classification: BMI 18.5 - 24.9 Normal Weight    Nutrition Interventions:   Continue NPO, Continue current Tube Feeding  Continued Inpatient Monitoring    Nutrition Evaluation:   · Evaluation: Progressing toward goals   · Goals: New goal: Meet nutritional needs through po intake or alternate source of nutrition   · Monitoring: TF Intake, TF Tolerance, Skin Integrity, Wound Healing, Weight, Pertinent Labs      Electronically signed by Svetlana Rosales, MS, RD, LD on 11/26/18 at 2:24 PM    Contact Number: [phone]

## 2018-11-27 NOTE — PROGRESS NOTES
Palliative Care Progress Note  11/27/2018 9:14 AM  Subjective:   Admit Date: 11/6/2018  PCP: Rock Nick MD    Chief Complaint: SOA, AMS    Interval History: Patient has continued to decline, is more lethargic, and had febrile episode with temp of 103F last night. Temp remains elevated at 100 F this morning, she is slightly tachypneic with RR of 28 and continues to require ventimask. TF has been stopped due coughing of secretions. Prognosis is poor, expected meeting with the patient's daughter today to review goals of care, including hospice services again. Review of Systems   Unable to complete ROS due to lethargy    DIET TUBE FEED CONTINUOUS/CYCLIC NPO; Renal Formula (Nepro);  Nasogastric; Continuous; 15; 36; 24; Exceptions are: Sips with Meds    Medications:   dextrose Stopped (11/19/18 1908)     Current Facility-Administered Medications   Medication Dose Route Frequency Provider Last Rate Last Dose    potassium phosphate 15 mmol in dextrose 5 % 250 mL IVPB  15 mmol Intravenous Once Claritza Tong MD        acetylcysteine (MUCOMYST) 20 % solution 600 mg  600 mg Inhalation TID Paulina Greenberg MD   600 mg at 11/27/18 6231    ipratropium-albuterol (DUONEB) nebulizer solution 1 ampule  1 ampule Inhalation Q4H FRAN King   1 ampule at 11/27/18 7058    metoprolol (LOPRESSOR) injection 2.5 mg  2.5 mg Intravenous Q6H Rock Nick MD   2.5 mg at 11/27/18 0347    morphine injection 1 mg  1 mg Intravenous Q2H PRN Neeru Quevedo MD   1 mg at 11/25/18 1213    Or    morphine injection 2 mg  2 mg Intravenous Q2H PRN Neeru Quevedo MD   2 mg at 11/27/18 0347    scopolamine (TRANSDERM-SCOP) transdermal patch 1 patch  1 patch Transdermal Q72H Neeru Quevedo MD   1 patch at 11/24/18 1517    heparin (porcine) injection 3,400 Units  3,400 Units Intercatheter PRN Angela Galvez MD   3,400 Units at 11/17/18 1613    0.9 % sodium chloride bolus  200 mL Intravenous PRN Martín daughter, Callum Pichardo, to discuss the patient's current health status and reviewed her overall decline, febrile episode, and decrease in BP as well. I spoke about recommendations for hospice care and signs/symptoms of transition towards the end of life that the patient is exhibiting and Callum Pichardo verbalized understanding. I did review the ability to transfer the patient to inpatient hospice for symptom management, including frequently used medications, and Callum Pichardo has indicated that she would like to pursue this goal of care. Callum Pichardo is going to come to the hospital to see the patient after speaking with her siblings and I will meet with her at that time. I contacted Dr. Hal Kelley regarding the wish for hospice care and will defer the hospice consult order to Dr. Hal Kelley. Hospice notified, nursing staff informed of conversation with the patient's daughter, and I have sent information to the hospice medical director for review/approval for inpatient hospice. Nursing staff can contact me at [phone] when the daughter arrives today. Palliative medicine will continue to follow. Thank you for consulting Palliative Care and allowing us to participate in the care of this patient.        Electronically signed by FRAN Coombs on 11/27/2018 at 9:14 AM    (Please note that portions of this note were completed with a voice recognition program.  Vipin Duff made to edit the dictations but occasionally words are mis-transcribed.)

## 2018-11-27 NOTE — PROGRESS NOTES
Nephrology ([facility]) Progress Note    Patient:  Tang Warren  YOB: 1940  Date of Service: 11/27/2018  MRN: [id number]   Acct: [de-identified]   Primary Care Physician: Surekha Sharpe MD  Advance Directive: [facility]  Admit Date: 11/6/2018       Hospital Day: 21  Referring Provider: Sagar Pfeiffer MD    Patient independently seen and examined, Chart, Consults, Notes, Operative notes, Labs, Cardiology, and Radiology studies reviewed as able. Subjective:  Tang Warren is a 69 y.o. female  whom we were consulted for BALJINDER. She has type 2 DM, CHF (systolo-diastolic), HTN, CKD stage 4, followed at the renal clinic. Her latest GFR was 26 when last seen in Oct 2018. She was having increasing dyspnea and was seen by cardiology who recommended hospital admission for CHF exacerbation. Hospital course remarkable for treatment with diuretics intravenously leading to worsening of renal function. She agreed to proceed with dialysis. On November 14, patient underwent insertion of permacath followed by 1st dialysis treatment. A few minutes into treatment patient had cardiopulmonary arrest and was successfully resuscitated. On 11/26, patient was seen on dialysis and was in respiratory distress. Pulse Ox was 71 %. She is now on 100% O2 non-rebreather. Her mental status has worsened as well and she has become more lethargic. Family agreed to talk to Palliative care. They made her DNR now. Today, she remained lethargic although awake. She was too exhausted to answer even simple questions.      Allergies:  Aspirin and Codeine    Medicines:  Current Facility-Administered Medications   Medication Dose Route Frequency Provider Last Rate Last Dose    potassium chloride 20 MEQ/15ML (10%) oral solution 20 mEq  20 mEq Oral Daily Surekha Sharpe MD   20 mEq at 11/26/18 1148    acetylcysteine (MUCOMYST) 20 % solution 600 mg  600 mg Inhalation TID French Jung MD   600 mg at 11/27/18 6476    Portable    Result Date: 11/6/2018  XR CHEST PORTABLE 11/6/2018 3:45 PM HISTORY: Shortness of breath COMPARISON: 9/13/2018. FINDINGS: Frontal view of the chest was obtained. Opacities in the left lower lobe are seen. Pulmonary vascularity is slightly increased. The cardiac silhouette remains enlarged. There is atherosclerosis in the aorta. The osseous structures and surrounding soft tissues demonstrate no acute abnormality. 1. Pulmonary vascular congestion and small left pleural effusion. Signed by Dr Glenn Herrmann on 11/6/2018 8:01 PM       Assessment   -BALJINDER / cardiorenal  -CKD 4  -Recent cardiopulmonary arrest  -DM2 with nephropathy on insulin  -Anemia CKD  -Chronic diastolic CHF  -Secondary hyperparathyroidism  -Acute resp failure  -Hypophosphatemia      Plan:  Prognosis remained poor. May need to consider withdrawing dialysis if clinical status continues to worsen. . Will give 1 dose of IV K-Phos. Follow up labs.      Duc Ann MD  11/27/18  8:32 AM

## 2018-11-27 NOTE — PROGRESS NOTES
Speech Language Pathology  Facility/Department: [facility] PROGRESSIVE CARE    NAME: Solange Villa  : 1940  MRN: [id number]     Patient with decline in medical status. Speech therapy to sign-off at this time. Please re-consult if medical status changes.      Electronically signed by QUIQUE Rodrigues on 2018 at 11:02 AM

## 2018-11-27 NOTE — PROGRESS NOTES
I met with the patient's daughter, Rose Hudson, in CCU waiting room to review the patient's status. Rose Hudson has been tearful and is aware that the patient is declining. I spoke with her about the ability to provide comfort and peace with hospice care and she does want to pursue inpatient hospice. Patient has been approved for the hospice care center by the hospice medical director and hospice Chaplain is aware of the need for sign in. I provided presence and emotional support to Rose Hudson and answered all questions. Rose Hudson would like to have the patient Dobhoff removed to assist with comfort and to have wrist restraints off as soon as possible. Nursing staff notified of meeting with the patient's daughter and Jose A Pop is present for hospice sign in.      Electronically signed by FRAN Gomez on 11/27/2018 at 1:23 PM

## 2018-11-27 NOTE — PROGRESS NOTES
Progress Note  11/27/2018 6:06 AM  Subjective:   Admit Date: 11/6/2018  PCP: Surekha Sharpe MD    Interval History: She is in the PCU and seems about the same. DIET TUBE FEED CONTINUOUS/CYCLIC NPO; Renal Formula (Nepro);  Nasogastric; Continuous; 15; 36; 24; Exceptions are: Sips with Meds    Intake/Output Summary (Last 24 hours) at 11/27/18 0606  Last data filed at 11/27/18 0430   Gross per 24 hour   Intake             1161 ml   Output                0 ml   Net             1161 ml     Medications:      dextrose Stopped (11/19/18 1908)      potassium chloride  20 mEq Oral Daily    acetylcysteine  600 mg Inhalation TID    ipratropium-albuterol  1 ampule Inhalation Q4H    metoprolol  2.5 mg Intravenous Q6H    scopolamine  1 patch Transdermal Q72H    pantoprazole  40 mg Intravenous BID    And    sodium chloride (PF)  10 mL Intravenous Daily    heparin (porcine)  5,000 Units Subcutaneous BID    insulin glargine  15 Units Subcutaneous Nightly    vitamin D  50,000 Units Oral Weekly    calcitRIOL  0.25 mcg Oral Daily    insulin lispro  0-12 Units Subcutaneous TID WC    insulin lispro  0-6 Units Subcutaneous Nightly    sodium chloride flush  10 mL Intravenous 2 times per day    influenza virus vaccine  0.5 mL Intramuscular Once    clopidogrel  75 mg Oral Daily    cilostazol  100 mg Oral BID     Recent Labs      11/27/18   0204   WBC  16.7*   HGB  7.8*   PLT  348     Recent Labs      11/25/18   0221  11/26/18   0115  11/27/18   0204   NA  143  148*  142   K  3.3*  3.6  4.3   CL  102  105  100   CO2  27  26  27   BUN  40*  60*  47*   CREATININE  2.4*  3.1*  2.6*   GLUCOSE  247*  247*  251*     Recent Labs      11/25/18   0221   AST  12   ALT  13   BILITOT  0.5   ALKPHOS  134*       Objective:   Vitals: BP (!) 120/58   Pulse 84   Temp 99.9 °F (37.7 °C) (Temporal)   Resp 24   Ht 5' 4\" (1.626 m)   Wt 135 lb 3.2 oz (61.3 kg)   LMP 03/15/1970 (Approximate)   SpO2 94%   BMI 23.21 kg/m²   General

## 2018-11-27 NOTE — DISCHARGE SUMMARY
[facility] [place][note 2] [place]  [address]        Janusz Griffith MD              Take medications as directed. Resume activity as tolerated. Diet: DIET TUBE FEED CONTINUOUS/CYCLIC NPO; Renal Formula (Nepro);  Nasogastric; Continuous; 15; 36; 24; Exceptions are: Sips with Meds     Disposition: Patient is medically stable and will be discharged *    Heather Garza MD, 11/27/2018 1:57 PM

## 2018-11-28 PROBLEM — N18.5 ACUTE RENAL FAILURE SUPERIMPOSED ON STAGE 5 CHRONIC KIDNEY DISEASE, NOT ON CHRONIC DIALYSIS (HCC): Chronic | Status: ACTIVE | Noted: 2018-01-01

## 2018-11-28 PROBLEM — N17.9 ACUTE RENAL FAILURE SUPERIMPOSED ON STAGE 5 CHRONIC KIDNEY DISEASE, NOT ON CHRONIC DIALYSIS (HCC): Chronic | Status: ACTIVE | Noted: 2018-01-01

## 2018-11-28 PROBLEM — S22.43XD MULTIPLE CLOSED FRACTURES OF RIBS OF BOTH SIDES WITH ROUTINE HEALING: Chronic | Status: ACTIVE | Noted: 2018-01-01

## 2025-05-03 NOTE — PROGRESS NOTES
Pt medicated per MAR. Pt updated on POC. Pt reports no other needs at this time. Call light is within reach. Bed is locked and in the lowest position.    Received call that pt had VT/VF rhythm, told tech that pt was in dialysis. Tech stated she was calling them too to report rhythm. Shortly after that there was a code called.